# Patient Record
Sex: FEMALE | Race: WHITE | Employment: OTHER | URBAN - METROPOLITAN AREA
[De-identification: names, ages, dates, MRNs, and addresses within clinical notes are randomized per-mention and may not be internally consistent; named-entity substitution may affect disease eponyms.]

---

## 2018-12-03 ENCOUNTER — HOSPITAL ENCOUNTER (OUTPATIENT)
Dept: VASCULAR LAB | Age: 83
Discharge: HOME | End: 2018-12-03
Payer: MEDICARE

## 2018-12-03 PROCEDURE — 93922 UPR/L XTREMITY ART 2 LEVELS: CPT

## 2020-10-11 ENCOUNTER — APPOINTMENT (OUTPATIENT)
Dept: GENERAL RADIOLOGY | Age: 85
DRG: 956 | End: 2020-10-11
Payer: MEDICARE

## 2020-10-11 ENCOUNTER — APPOINTMENT (OUTPATIENT)
Dept: CT IMAGING | Age: 85
DRG: 956 | End: 2020-10-11
Payer: MEDICARE

## 2020-10-11 ENCOUNTER — HOSPITAL ENCOUNTER (INPATIENT)
Age: 85
LOS: 4 days | Discharge: NURSING, REHAB OR LONG-TERM CARE | DRG: 956 | End: 2020-10-16
Attending: EMERGENCY MEDICINE | Admitting: FAMILY MEDICINE
Payer: MEDICARE

## 2020-10-11 LAB
A/G RATIO: 1.5 (ref 1.1–2.2)
ALBUMIN SERPL-MCNC: 4.1 G/DL (ref 3.4–5)
ALP BLD-CCNC: 87 U/L (ref 40–129)
ALT SERPL-CCNC: 36 U/L (ref 10–40)
ANION GAP SERPL CALCULATED.3IONS-SCNC: 13 MMOL/L (ref 3–16)
APTT: 20.8 SEC (ref 24.2–36.2)
AST SERPL-CCNC: 79 U/L (ref 15–37)
BACTERIA: ABNORMAL /HPF
BASOPHILS ABSOLUTE: 0.1 K/UL (ref 0–0.2)
BASOPHILS RELATIVE PERCENT: 0.5 %
BILIRUB SERPL-MCNC: 0.8 MG/DL (ref 0–1)
BILIRUBIN URINE: NEGATIVE
BLOOD, URINE: ABNORMAL
BUN BLDV-MCNC: 32 MG/DL (ref 7–20)
CALCIUM SERPL-MCNC: 9.1 MG/DL (ref 8.3–10.6)
CHLORIDE BLD-SCNC: 102 MMOL/L (ref 99–110)
CLARITY: ABNORMAL
CO2: 22 MMOL/L (ref 21–32)
COLOR: YELLOW
CREAT SERPL-MCNC: <0.5 MG/DL (ref 0.6–1.2)
EOSINOPHILS ABSOLUTE: 0.1 K/UL (ref 0–0.6)
EOSINOPHILS RELATIVE PERCENT: 0.7 %
EPITHELIAL CELLS, UA: ABNORMAL /HPF (ref 0–5)
GFR AFRICAN AMERICAN: >60
GFR NON-AFRICAN AMERICAN: >60
GLOBULIN: 2.8 G/DL
GLUCOSE BLD-MCNC: 142 MG/DL (ref 70–99)
GLUCOSE URINE: NEGATIVE MG/DL
HCT VFR BLD CALC: 37.1 % (ref 36–48)
HEMOGLOBIN: 12.5 G/DL (ref 12–16)
INR BLD: 1.16 (ref 0.86–1.14)
KETONES, URINE: 15 MG/DL
LEUKOCYTE ESTERASE, URINE: ABNORMAL
LYMPHOCYTES ABSOLUTE: 3 K/UL (ref 1–5.1)
LYMPHOCYTES RELATIVE PERCENT: 18.6 %
MCH RBC QN AUTO: 31.2 PG (ref 26–34)
MCHC RBC AUTO-ENTMCNC: 33.6 G/DL (ref 31–36)
MCV RBC AUTO: 92.7 FL (ref 80–100)
MICROSCOPIC EXAMINATION: YES
MONOCYTES ABSOLUTE: 1.5 K/UL (ref 0–1.3)
MONOCYTES RELATIVE PERCENT: 9.2 %
MUCUS: ABNORMAL /LPF
NEUTROPHILS ABSOLUTE: 11.6 K/UL (ref 1.7–7.7)
NEUTROPHILS RELATIVE PERCENT: 71 %
NITRITE, URINE: POSITIVE
PDW BLD-RTO: 14 % (ref 12.4–15.4)
PH UA: 6 (ref 5–8)
PLATELET # BLD: 218 K/UL (ref 135–450)
PMV BLD AUTO: 9.7 FL (ref 5–10.5)
POTASSIUM SERPL-SCNC: 4 MMOL/L (ref 3.5–5.1)
PROTEIN UA: 30 MG/DL
PROTHROMBIN TIME: 13.5 SEC (ref 10–13.2)
RBC # BLD: 4 M/UL (ref 4–5.2)
RBC UA: ABNORMAL /HPF (ref 0–4)
SARS-COV-2, NAAT: NOT DETECTED
SODIUM BLD-SCNC: 137 MMOL/L (ref 136–145)
SPECIFIC GRAVITY UA: >=1.03 (ref 1–1.03)
TOTAL CK: 2872 U/L (ref 26–192)
TOTAL PROTEIN: 6.9 G/DL (ref 6.4–8.2)
TROPONIN: <0.01 NG/ML
URINE REFLEX TO CULTURE: YES
URINE TYPE: ABNORMAL
UROBILINOGEN, URINE: 0.2 E.U./DL
WBC # BLD: 16.4 K/UL (ref 4–11)
WBC UA: ABNORMAL /HPF (ref 0–5)

## 2020-10-11 PROCEDURE — 82550 ASSAY OF CK (CPK): CPT

## 2020-10-11 PROCEDURE — 85730 THROMBOPLASTIN TIME PARTIAL: CPT

## 2020-10-11 PROCEDURE — 72125 CT NECK SPINE W/O DYE: CPT

## 2020-10-11 PROCEDURE — 80053 COMPREHEN METABOLIC PANEL: CPT

## 2020-10-11 PROCEDURE — 2580000003 HC RX 258: Performed by: NURSE PRACTITIONER

## 2020-10-11 PROCEDURE — 70450 CT HEAD/BRAIN W/O DYE: CPT

## 2020-10-11 PROCEDURE — 72192 CT PELVIS W/O DYE: CPT

## 2020-10-11 PROCEDURE — 87086 URINE CULTURE/COLONY COUNT: CPT

## 2020-10-11 PROCEDURE — 87077 CULTURE AEROBIC IDENTIFY: CPT

## 2020-10-11 PROCEDURE — 86900 BLOOD TYPING SEROLOGIC ABO: CPT

## 2020-10-11 PROCEDURE — U0002 COVID-19 LAB TEST NON-CDC: HCPCS

## 2020-10-11 PROCEDURE — 36415 COLL VENOUS BLD VENIPUNCTURE: CPT

## 2020-10-11 PROCEDURE — 86901 BLOOD TYPING SEROLOGIC RH(D): CPT

## 2020-10-11 PROCEDURE — 99285 EMERGENCY DEPT VISIT HI MDM: CPT

## 2020-10-11 PROCEDURE — 93005 ELECTROCARDIOGRAM TRACING: CPT | Performed by: EMERGENCY MEDICINE

## 2020-10-11 PROCEDURE — 73502 X-RAY EXAM HIP UNI 2-3 VIEWS: CPT

## 2020-10-11 PROCEDURE — 85610 PROTHROMBIN TIME: CPT

## 2020-10-11 PROCEDURE — 85025 COMPLETE CBC W/AUTO DIFF WBC: CPT

## 2020-10-11 PROCEDURE — 81001 URINALYSIS AUTO W/SCOPE: CPT

## 2020-10-11 PROCEDURE — 71045 X-RAY EXAM CHEST 1 VIEW: CPT

## 2020-10-11 PROCEDURE — 84484 ASSAY OF TROPONIN QUANT: CPT

## 2020-10-11 PROCEDURE — 6370000000 HC RX 637 (ALT 250 FOR IP): Performed by: NURSE PRACTITIONER

## 2020-10-11 PROCEDURE — 84100 ASSAY OF PHOSPHORUS: CPT

## 2020-10-11 PROCEDURE — 51702 INSERT TEMP BLADDER CATH: CPT

## 2020-10-11 PROCEDURE — 87186 SC STD MICRODIL/AGAR DIL: CPT

## 2020-10-11 PROCEDURE — 86850 RBC ANTIBODY SCREEN: CPT

## 2020-10-11 PROCEDURE — 84550 ASSAY OF BLOOD/URIC ACID: CPT

## 2020-10-11 RX ORDER — ACETAMINOPHEN 500 MG
1000 TABLET ORAL ONCE
Status: COMPLETED | OUTPATIENT
Start: 2020-10-11 | End: 2020-10-11

## 2020-10-11 RX ORDER — BUSPIRONE HYDROCHLORIDE 5 MG/1
1 TABLET ORAL DAILY
COMMUNITY
Start: 2020-10-07

## 2020-10-11 RX ORDER — SODIUM CHLORIDE 9 MG/ML
1000 INJECTION, SOLUTION INTRAVENOUS CONTINUOUS
Status: DISCONTINUED | OUTPATIENT
Start: 2020-10-11 | End: 2020-10-13

## 2020-10-11 RX ORDER — DICLOFENAC SODIUM 75 MG/1
1 TABLET, DELAYED RELEASE ORAL 2 TIMES DAILY
Status: ON HOLD | COMMUNITY
Start: 2020-10-07 | End: 2020-10-12 | Stop reason: ALTCHOICE

## 2020-10-11 RX ORDER — LEVOTHYROXINE SODIUM 0.05 MG/1
1 TABLET ORAL DAILY
COMMUNITY
Start: 2020-07-28

## 2020-10-11 RX ORDER — PAROXETINE HYDROCHLORIDE 40 MG/1
1 TABLET, FILM COATED ORAL DAILY
Status: ON HOLD | COMMUNITY
Start: 2020-10-07 | End: 2020-10-12

## 2020-10-11 RX ORDER — OMEPRAZOLE 20 MG/1
1 CAPSULE, DELAYED RELEASE ORAL DAILY
Status: ON HOLD | COMMUNITY
Start: 2020-10-07 | End: 2021-02-24

## 2020-10-11 RX ORDER — LIDOCAINE 4 G/G
1 PATCH TOPICAL DAILY
Status: DISCONTINUED | OUTPATIENT
Start: 2020-10-11 | End: 2020-10-16 | Stop reason: HOSPADM

## 2020-10-11 RX ORDER — LANOLIN ALCOHOL/MO/W.PET/CERES
6 CREAM (GRAM) TOPICAL EVERY EVENING
Status: ON HOLD | COMMUNITY
Start: 2020-01-22 | End: 2020-10-12

## 2020-10-11 RX ORDER — 0.9 % SODIUM CHLORIDE 0.9 %
1000 INTRAVENOUS SOLUTION INTRAVENOUS ONCE
Status: COMPLETED | OUTPATIENT
Start: 2020-10-11 | End: 2020-10-11

## 2020-10-11 RX ADMIN — ACETAMINOPHEN 1000 MG: 500 TABLET ORAL at 20:26

## 2020-10-11 RX ADMIN — SODIUM CHLORIDE 1000 ML: 9 INJECTION, SOLUTION INTRAVENOUS at 22:34

## 2020-10-11 RX ADMIN — SODIUM CHLORIDE 1000 ML: 9 INJECTION, SOLUTION INTRAVENOUS at 20:27

## 2020-10-11 SDOH — HEALTH STABILITY: MENTAL HEALTH: HOW OFTEN DO YOU HAVE A DRINK CONTAINING ALCOHOL?: NEVER

## 2020-10-11 ASSESSMENT — PAIN SCALES - GENERAL
PAINLEVEL_OUTOF10: 9
PAINLEVEL_OUTOF10: 8
PAINLEVEL_OUTOF10: 9

## 2020-10-11 ASSESSMENT — PAIN DESCRIPTION - ORIENTATION: ORIENTATION: RIGHT

## 2020-10-11 ASSESSMENT — PAIN DESCRIPTION - DESCRIPTORS: DESCRIPTORS: ACHING

## 2020-10-11 ASSESSMENT — PAIN DESCRIPTION - PAIN TYPE: TYPE: ACUTE PAIN

## 2020-10-11 ASSESSMENT — PAIN DESCRIPTION - PROGRESSION: CLINICAL_PROGRESSION: NOT CHANGED

## 2020-10-11 ASSESSMENT — PAIN DESCRIPTION - LOCATION: LOCATION: HIP

## 2020-10-11 ASSESSMENT — PAIN DESCRIPTION - FREQUENCY: FREQUENCY: CONTINUOUS

## 2020-10-11 NOTE — ED NOTES

## 2020-10-11 NOTE — ED NOTES
Bed: 16  Expected date: 10/11/20  Expected time:   Means of arrival:   Comments:  Sean Raza RN  10/11/20 1920

## 2020-10-12 ENCOUNTER — APPOINTMENT (OUTPATIENT)
Dept: GENERAL RADIOLOGY | Age: 85
DRG: 956 | End: 2020-10-12
Payer: MEDICARE

## 2020-10-12 ENCOUNTER — ANESTHESIA (OUTPATIENT)
Dept: OPERATING ROOM | Age: 85
DRG: 956 | End: 2020-10-12
Payer: MEDICARE

## 2020-10-12 ENCOUNTER — ANESTHESIA EVENT (OUTPATIENT)
Dept: OPERATING ROOM | Age: 85
DRG: 956 | End: 2020-10-12
Payer: MEDICARE

## 2020-10-12 VITALS
OXYGEN SATURATION: 86 % | SYSTOLIC BLOOD PRESSURE: 137 MMHG | DIASTOLIC BLOOD PRESSURE: 63 MMHG | RESPIRATION RATE: 17 BRPM

## 2020-10-12 PROBLEM — T14.8XXA HEMATOMA: Status: ACTIVE | Noted: 2020-01-21

## 2020-10-12 PROBLEM — N30.00 ACUTE CYSTITIS: Status: ACTIVE | Noted: 2020-01-20

## 2020-10-12 PROBLEM — F32.A DEPRESSION: Status: ACTIVE | Noted: 2020-01-20

## 2020-10-12 PROBLEM — S27.329A PULMONARY CONTUSION: Status: ACTIVE | Noted: 2020-01-21

## 2020-10-12 PROBLEM — S22.20XA STERNAL FRACTURE: Status: ACTIVE | Noted: 2020-01-21
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
## 2020-10-12 PROBLEM — M62.82 RHABDOMYOLYSIS: Status: ACTIVE | Noted: 2020-10-12

## 2020-10-12 PROBLEM — F41.9 ANXIETY: Status: ACTIVE | Noted: 2020-10-12

## 2020-10-12 PROBLEM — R94.31 PROLONGED QT INTERVAL: Status: ACTIVE | Noted: 2020-10-12

## 2020-10-12 PROBLEM — N39.0 UTI (URINARY TRACT INFECTION): Status: ACTIVE | Noted: 2020-10-12

## 2020-10-12 PROBLEM — K52.9 COLITIS: Status: ACTIVE | Noted: 2020-01-21

## 2020-10-12 PROBLEM — S72.002P: Status: ACTIVE | Noted: 2020-10-12

## 2020-10-12 PROBLEM — S91.113A: Status: ACTIVE | Noted: 2020-01-21

## 2020-10-12 PROBLEM — K21.9 GERD (GASTROESOPHAGEAL REFLUX DISEASE): Status: ACTIVE | Noted: 2020-10-12

## 2020-10-12 PROBLEM — S01.01XA SCALP LACERATION: Status: ACTIVE | Noted: 2020-01-21

## 2020-10-12 PROBLEM — D72.829 LEUKOCYTOSIS: Status: ACTIVE | Noted: 2020-10-12

## 2020-10-12 PROBLEM — R74.8 ELEVATED CK: Status: ACTIVE | Noted: 2020-10-12

## 2020-10-12 LAB
ABO/RH: NORMAL
ANTIBODY SCREEN: NORMAL
EKG ATRIAL RATE: 88 BPM
EKG DIAGNOSIS: NORMAL
EKG P AXIS: 56 DEGREES
EKG P-R INTERVAL: 164 MS
EKG Q-T INTERVAL: 394 MS
EKG QRS DURATION: 84 MS
EKG QTC CALCULATION (BAZETT): 476 MS
EKG R AXIS: -26 DEGREES
EKG T AXIS: 12 DEGREES
EKG VENTRICULAR RATE: 88 BPM
PHOSPHORUS: 2.4 MG/DL (ref 2.5–4.9)
URIC ACID, SERUM: 4.2 MG/DL (ref 2.6–6)

## 2020-10-12 PROCEDURE — 93010 ELECTROCARDIOGRAM REPORT: CPT | Performed by: INTERNAL MEDICINE

## 2020-10-12 PROCEDURE — 2580000003 HC RX 258: Performed by: FAMILY MEDICINE

## 2020-10-12 PROCEDURE — 6370000000 HC RX 637 (ALT 250 FOR IP): Performed by: STUDENT IN AN ORGANIZED HEALTH CARE EDUCATION/TRAINING PROGRAM

## 2020-10-12 PROCEDURE — 2580000003 HC RX 258: Performed by: NURSE ANESTHETIST, CERTIFIED REGISTERED

## 2020-10-12 PROCEDURE — 6360000002 HC RX W HCPCS: Performed by: ORTHOPAEDIC SURGERY

## 2020-10-12 PROCEDURE — 2580000003 HC RX 258: Performed by: NURSE PRACTITIONER

## 2020-10-12 PROCEDURE — 2709999900 HC NON-CHARGEABLE SUPPLY: Performed by: ORTHOPAEDIC SURGERY

## 2020-10-12 PROCEDURE — 3700000000 HC ANESTHESIA ATTENDED CARE: Performed by: ORTHOPAEDIC SURGERY

## 2020-10-12 PROCEDURE — 6370000000 HC RX 637 (ALT 250 FOR IP): Performed by: ORTHOPAEDIC SURGERY

## 2020-10-12 PROCEDURE — 2500000003 HC RX 250 WO HCPCS: Performed by: ORTHOPAEDIC SURGERY

## 2020-10-12 PROCEDURE — 6360000002 HC RX W HCPCS: Performed by: PHYSICIAN ASSISTANT

## 2020-10-12 PROCEDURE — 2500000003 HC RX 250 WO HCPCS: Performed by: NURSE ANESTHETIST, CERTIFIED REGISTERED

## 2020-10-12 PROCEDURE — 2500000003 HC RX 250 WO HCPCS: Performed by: ANESTHESIOLOGY

## 2020-10-12 PROCEDURE — 6360000002 HC RX W HCPCS: Performed by: STUDENT IN AN ORGANIZED HEALTH CARE EDUCATION/TRAINING PROGRAM

## 2020-10-12 PROCEDURE — C1776 JOINT DEVICE (IMPLANTABLE): HCPCS | Performed by: ORTHOPAEDIC SURGERY

## 2020-10-12 PROCEDURE — 94640 AIRWAY INHALATION TREATMENT: CPT

## 2020-10-12 PROCEDURE — 73501 X-RAY EXAM HIP UNI 1 VIEW: CPT

## 2020-10-12 PROCEDURE — 3600000015 HC SURGERY LEVEL 5 ADDTL 15MIN: Performed by: ORTHOPAEDIC SURGERY

## 2020-10-12 PROCEDURE — 6360000002 HC RX W HCPCS: Performed by: NURSE PRACTITIONER

## 2020-10-12 PROCEDURE — 6360000002 HC RX W HCPCS: Performed by: NURSE ANESTHETIST, CERTIFIED REGISTERED

## 2020-10-12 PROCEDURE — 2580000003 HC RX 258: Performed by: ORTHOPAEDIC SURGERY

## 2020-10-12 PROCEDURE — 3600000005 HC SURGERY LEVEL 5 BASE: Performed by: ORTHOPAEDIC SURGERY

## 2020-10-12 PROCEDURE — 7100000000 HC PACU RECOVERY - FIRST 15 MIN: Performed by: ORTHOPAEDIC SURGERY

## 2020-10-12 PROCEDURE — 1200000000 HC SEMI PRIVATE

## 2020-10-12 PROCEDURE — 3700000001 HC ADD 15 MINUTES (ANESTHESIA): Performed by: ORTHOPAEDIC SURGERY

## 2020-10-12 PROCEDURE — 2580000003 HC RX 258: Performed by: STUDENT IN AN ORGANIZED HEALTH CARE EDUCATION/TRAINING PROGRAM

## 2020-10-12 PROCEDURE — 0SRS01Z REPLACEMENT OF LEFT HIP JOINT, FEMORAL SURFACE WITH METAL SYNTHETIC SUBSTITUTE, OPEN APPROACH: ICD-10-PCS | Performed by: ORTHOPAEDIC SURGERY

## 2020-10-12 PROCEDURE — 7100000001 HC PACU RECOVERY - ADDTL 15 MIN: Performed by: ORTHOPAEDIC SURGERY

## 2020-10-12 PROCEDURE — 72170 X-RAY EXAM OF PELVIS: CPT

## 2020-10-12 PROCEDURE — C9290 INJ, BUPIVACAINE LIPOSOME: HCPCS | Performed by: ORTHOPAEDIC SURGERY

## 2020-10-12 PROCEDURE — C9113 INJ PANTOPRAZOLE SODIUM, VIA: HCPCS | Performed by: STUDENT IN AN ORGANIZED HEALTH CARE EDUCATION/TRAINING PROGRAM

## 2020-10-12 DEVICE — STEM FEM SZ 15 L150MM 133DEG DST HIP PPS STD OFFSET TYP 1: Type: IMPLANTABLE DEVICE | Status: FUNCTIONAL

## 2020-10-12 DEVICE — HEAD FEM DIA28MM STD OFFSET HIP CO CHROM TYP 1 PRI MOD 2: Type: IMPLANTABLE DEVICE | Status: FUNCTIONAL

## 2020-10-12 DEVICE — IMPLANTABLE DEVICE: Type: IMPLANTABLE DEVICE | Status: FUNCTIONAL

## 2020-10-12 RX ORDER — MORPHINE SULFATE 2 MG/ML
2 INJECTION, SOLUTION INTRAMUSCULAR; INTRAVENOUS
Status: DISCONTINUED | OUTPATIENT
Start: 2020-10-12 | End: 2020-10-16 | Stop reason: HOSPADM

## 2020-10-12 RX ORDER — SENNA AND DOCUSATE SODIUM 50; 8.6 MG/1; MG/1
2 TABLET, FILM COATED ORAL DAILY
Status: DISCONTINUED | OUTPATIENT
Start: 2020-10-12 | End: 2020-10-12 | Stop reason: SDUPTHER

## 2020-10-12 RX ORDER — PROMETHAZINE HYDROCHLORIDE 25 MG/1
12.5 TABLET ORAL EVERY 6 HOURS PRN
Status: DISCONTINUED | OUTPATIENT
Start: 2020-10-12 | End: 2020-10-16 | Stop reason: HOSPADM

## 2020-10-12 RX ORDER — PHENYLEPHRINE HCL IN 0.9% NACL 1 MG/10 ML
SYRINGE (ML) INTRAVENOUS PRN
Status: DISCONTINUED | OUTPATIENT
Start: 2020-10-12 | End: 2020-10-12 | Stop reason: SDUPTHER

## 2020-10-12 RX ORDER — POLYETHYLENE GLYCOL 3350 17 G/17G
17 POWDER, FOR SOLUTION ORAL 2 TIMES DAILY
Status: ON HOLD | COMMUNITY
Start: 2020-01-22 | End: 2020-10-16 | Stop reason: SDUPTHER

## 2020-10-12 RX ORDER — SODIUM CHLORIDE 0.9 % (FLUSH) 0.9 %
10 SYRINGE (ML) INJECTION PRN
Status: DISCONTINUED | OUTPATIENT
Start: 2020-10-12 | End: 2020-10-12 | Stop reason: HOSPADM

## 2020-10-12 RX ORDER — GABAPENTIN 100 MG/1
100 CAPSULE ORAL EVERY 8 HOURS
Status: ON HOLD | COMMUNITY
Start: 2020-01-22 | End: 2020-10-16 | Stop reason: HOSPADM

## 2020-10-12 RX ORDER — POLYETHYLENE GLYCOL 3350 17 G/17G
17 POWDER, FOR SOLUTION ORAL DAILY PRN
Status: DISCONTINUED | OUTPATIENT
Start: 2020-10-12 | End: 2020-10-14

## 2020-10-12 RX ORDER — SODIUM CHLORIDE 0.9 % (FLUSH) 0.9 %
10 SYRINGE (ML) INJECTION PRN
Status: DISCONTINUED | OUTPATIENT
Start: 2020-10-12 | End: 2020-10-16 | Stop reason: HOSPADM

## 2020-10-12 RX ORDER — COVID-19 ANTIGEN TEST
1 KIT MISCELLANEOUS NIGHTLY
Status: ON HOLD | COMMUNITY
End: 2020-10-12 | Stop reason: ALTCHOICE

## 2020-10-12 RX ORDER — BACITRACIN ZINC AND POLYMYXIN B SULFATE 500; 1000 [USP'U]/G; [USP'U]/G
15 OINTMENT TOPICAL 2 TIMES DAILY
Status: ON HOLD | COMMUNITY
Start: 2020-01-22 | End: 2020-10-12

## 2020-10-12 RX ORDER — OXYCODONE HYDROCHLORIDE AND ACETAMINOPHEN 5; 325 MG/1; MG/1
1 TABLET ORAL PRN
Status: DISCONTINUED | OUTPATIENT
Start: 2020-10-12 | End: 2020-10-12 | Stop reason: HOSPADM

## 2020-10-12 RX ORDER — PAROXETINE HYDROCHLORIDE 20 MG/1
20 TABLET, FILM COATED ORAL DAILY
Status: ON HOLD | COMMUNITY
Start: 2020-01-23 | End: 2020-10-12

## 2020-10-12 RX ORDER — ACETAMINOPHEN 325 MG/1
650 TABLET ORAL EVERY 4 HOURS PRN
Status: DISCONTINUED | OUTPATIENT
Start: 2020-10-12 | End: 2020-10-12

## 2020-10-12 RX ORDER — OXYCODONE HYDROCHLORIDE 5 MG/1
5 TABLET ORAL EVERY 4 HOURS PRN
Status: DISCONTINUED | OUTPATIENT
Start: 2020-10-12 | End: 2020-10-16 | Stop reason: HOSPADM

## 2020-10-12 RX ORDER — POLYETHYLENE GLYCOL 3350 17 G/17G
17 POWDER, FOR SOLUTION ORAL 2 TIMES DAILY
Status: DISCONTINUED | OUTPATIENT
Start: 2020-10-12 | End: 2020-10-16 | Stop reason: HOSPADM

## 2020-10-12 RX ORDER — POLYETHYLENE GLYCOL 3350 17 G/17G
17 POWDER, FOR SOLUTION ORAL 2 TIMES DAILY
Status: DISCONTINUED | OUTPATIENT
Start: 2020-10-12 | End: 2020-10-12

## 2020-10-12 RX ORDER — FENTANYL CITRATE 50 UG/ML
INJECTION, SOLUTION INTRAMUSCULAR; INTRAVENOUS PRN
Status: DISCONTINUED | OUTPATIENT
Start: 2020-10-12 | End: 2020-10-12 | Stop reason: SDUPTHER

## 2020-10-12 RX ORDER — LIDOCAINE HYDROCHLORIDE 20 MG/ML
INJECTION, SOLUTION INFILTRATION; PERINEURAL PRN
Status: DISCONTINUED | OUTPATIENT
Start: 2020-10-12 | End: 2020-10-12 | Stop reason: SDUPTHER

## 2020-10-12 RX ORDER — ONDANSETRON 2 MG/ML
INJECTION INTRAMUSCULAR; INTRAVENOUS PRN
Status: DISCONTINUED | OUTPATIENT
Start: 2020-10-12 | End: 2020-10-12 | Stop reason: SDUPTHER

## 2020-10-12 RX ORDER — SENNA AND DOCUSATE SODIUM 50; 8.6 MG/1; MG/1
1 TABLET, FILM COATED ORAL 2 TIMES DAILY
Status: DISCONTINUED | OUTPATIENT
Start: 2020-10-12 | End: 2020-10-14

## 2020-10-12 RX ORDER — BUSPIRONE HYDROCHLORIDE 5 MG/1
5 TABLET ORAL DAILY
Status: DISCONTINUED | OUTPATIENT
Start: 2020-10-12 | End: 2020-10-16 | Stop reason: HOSPADM

## 2020-10-12 RX ORDER — OXYCODONE HYDROCHLORIDE 5 MG/1
10 TABLET ORAL EVERY 4 HOURS PRN
Status: DISCONTINUED | OUTPATIENT
Start: 2020-10-12 | End: 2020-10-16 | Stop reason: HOSPADM

## 2020-10-12 RX ORDER — BISACODYL 10 MG
10 SUPPOSITORY, RECTAL RECTAL DAILY PRN
Status: DISCONTINUED | OUTPATIENT
Start: 2020-10-12 | End: 2020-10-16 | Stop reason: HOSPADM

## 2020-10-12 RX ORDER — ONDANSETRON 2 MG/ML
4 INJECTION INTRAMUSCULAR; INTRAVENOUS
Status: DISCONTINUED | OUTPATIENT
Start: 2020-10-12 | End: 2020-10-12 | Stop reason: HOSPADM

## 2020-10-12 RX ORDER — LABETALOL HYDROCHLORIDE 5 MG/ML
INJECTION, SOLUTION INTRAVENOUS PRN
Status: DISCONTINUED | OUTPATIENT
Start: 2020-10-12 | End: 2020-10-12 | Stop reason: SDUPTHER

## 2020-10-12 RX ORDER — DICLOFENAC SODIUM 75 MG/1
1 TABLET, DELAYED RELEASE ORAL EVERY 12 HOURS
Status: ON HOLD | COMMUNITY
Start: 2018-11-27 | End: 2020-10-12 | Stop reason: ALTCHOICE

## 2020-10-12 RX ORDER — OXYCODONE HYDROCHLORIDE AND ACETAMINOPHEN 5; 325 MG/1; MG/1
2 TABLET ORAL PRN
Status: DISCONTINUED | OUTPATIENT
Start: 2020-10-12 | End: 2020-10-12 | Stop reason: HOSPADM

## 2020-10-12 RX ORDER — ALBUTEROL SULFATE 90 UG/1
2 AEROSOL, METERED RESPIRATORY (INHALATION) EVERY 4 HOURS PRN
Status: DISCONTINUED | OUTPATIENT
Start: 2020-10-12 | End: 2020-10-12

## 2020-10-12 RX ORDER — ONDANSETRON 2 MG/ML
4 INJECTION INTRAMUSCULAR; INTRAVENOUS EVERY 6 HOURS PRN
Status: DISCONTINUED | OUTPATIENT
Start: 2020-10-12 | End: 2020-10-12

## 2020-10-12 RX ORDER — AMOXICILLIN 250 MG
1 CAPSULE ORAL 2 TIMES DAILY
Status: ON HOLD | COMMUNITY
Start: 2020-01-22 | End: 2020-10-16 | Stop reason: HOSPADM

## 2020-10-12 RX ORDER — MORPHINE SULFATE 2 MG/ML
2 INJECTION, SOLUTION INTRAMUSCULAR; INTRAVENOUS EVERY 5 MIN PRN
Status: DISCONTINUED | OUTPATIENT
Start: 2020-10-12 | End: 2020-10-12 | Stop reason: HOSPADM

## 2020-10-12 RX ORDER — PANTOPRAZOLE SODIUM 40 MG/10ML
40 INJECTION, POWDER, LYOPHILIZED, FOR SOLUTION INTRAVENOUS DAILY
Status: DISCONTINUED | OUTPATIENT
Start: 2020-10-12 | End: 2020-10-16 | Stop reason: HOSPADM

## 2020-10-12 RX ORDER — PROPOFOL 10 MG/ML
INJECTION, EMULSION INTRAVENOUS PRN
Status: DISCONTINUED | OUTPATIENT
Start: 2020-10-12 | End: 2020-10-12 | Stop reason: SDUPTHER

## 2020-10-12 RX ORDER — SODIUM CHLORIDE 0.9 % (FLUSH) 0.9 %
10 SYRINGE (ML) INJECTION EVERY 12 HOURS SCHEDULED
Status: DISCONTINUED | OUTPATIENT
Start: 2020-10-12 | End: 2020-10-12 | Stop reason: HOSPADM

## 2020-10-12 RX ORDER — CITALOPRAM 10 MG/1
10 TABLET ORAL DAILY
Status: ON HOLD | COMMUNITY
Start: 2020-01-27 | End: 2020-10-12

## 2020-10-12 RX ORDER — ROCURONIUM BROMIDE 10 MG/ML
INJECTION, SOLUTION INTRAVENOUS PRN
Status: DISCONTINUED | OUTPATIENT
Start: 2020-10-12 | End: 2020-10-12 | Stop reason: SDUPTHER

## 2020-10-12 RX ORDER — MORPHINE SULFATE 2 MG/ML
1 INJECTION, SOLUTION INTRAMUSCULAR; INTRAVENOUS EVERY 5 MIN PRN
Status: DISCONTINUED | OUTPATIENT
Start: 2020-10-12 | End: 2020-10-12 | Stop reason: HOSPADM

## 2020-10-12 RX ORDER — SODIUM CHLORIDE 0.9 % (FLUSH) 0.9 %
10 SYRINGE (ML) INJECTION EVERY 12 HOURS SCHEDULED
Status: DISCONTINUED | OUTPATIENT
Start: 2020-10-12 | End: 2020-10-12 | Stop reason: SDUPTHER

## 2020-10-12 RX ORDER — LEVOTHYROXINE SODIUM 0.05 MG/1
50 TABLET ORAL DAILY
Status: DISCONTINUED | OUTPATIENT
Start: 2020-10-12 | End: 2020-10-16 | Stop reason: HOSPADM

## 2020-10-12 RX ORDER — CITALOPRAM 20 MG/1
20 TABLET ORAL DAILY
Status: ON HOLD | COMMUNITY
Start: 2020-02-03 | End: 2020-10-12

## 2020-10-12 RX ORDER — PAROXETINE HYDROCHLORIDE 20 MG/1
40 TABLET, FILM COATED ORAL DAILY
Status: DISCONTINUED | OUTPATIENT
Start: 2020-10-12 | End: 2020-10-16 | Stop reason: HOSPADM

## 2020-10-12 RX ORDER — SODIUM CHLORIDE 9 MG/ML
INJECTION, SOLUTION INTRAVENOUS CONTINUOUS
Status: DISCONTINUED | OUTPATIENT
Start: 2020-10-12 | End: 2020-10-13

## 2020-10-12 RX ORDER — MORPHINE SULFATE 4 MG/ML
4 INJECTION, SOLUTION INTRAMUSCULAR; INTRAVENOUS
Status: DISCONTINUED | OUTPATIENT
Start: 2020-10-12 | End: 2020-10-16 | Stop reason: HOSPADM

## 2020-10-12 RX ORDER — ACETAMINOPHEN 325 MG/1
650 TABLET ORAL ONCE
Status: DISCONTINUED | OUTPATIENT
Start: 2020-10-12 | End: 2020-10-12

## 2020-10-12 RX ORDER — ALBUTEROL SULFATE 2.5 MG/3ML
2.5 SOLUTION RESPIRATORY (INHALATION) EVERY 4 HOURS PRN
Status: DISCONTINUED | OUTPATIENT
Start: 2020-10-12 | End: 2020-10-13

## 2020-10-12 RX ORDER — SODIUM CHLORIDE, SODIUM LACTATE, POTASSIUM CHLORIDE, CALCIUM CHLORIDE 600; 310; 30; 20 MG/100ML; MG/100ML; MG/100ML; MG/100ML
INJECTION, SOLUTION INTRAVENOUS CONTINUOUS
Status: DISCONTINUED | OUTPATIENT
Start: 2020-10-12 | End: 2020-10-12

## 2020-10-12 RX ORDER — ACETAMINOPHEN 325 MG/1
650 TABLET ORAL EVERY 6 HOURS
Status: DISCONTINUED | OUTPATIENT
Start: 2020-10-12 | End: 2020-10-13

## 2020-10-12 RX ORDER — MEPERIDINE HYDROCHLORIDE 50 MG/ML
12.5 INJECTION INTRAMUSCULAR; INTRAVENOUS; SUBCUTANEOUS EVERY 5 MIN PRN
Status: DISCONTINUED | OUTPATIENT
Start: 2020-10-12 | End: 2020-10-12 | Stop reason: HOSPADM

## 2020-10-12 RX ORDER — LIDOCAINE 50 MG/G
1 PATCH TOPICAL EVERY 24 HOURS
Status: ON HOLD | COMMUNITY
Start: 2020-01-22 | End: 2020-10-16 | Stop reason: HOSPADM

## 2020-10-12 RX ORDER — SODIUM CHLORIDE, SODIUM LACTATE, POTASSIUM CHLORIDE, CALCIUM CHLORIDE 600; 310; 30; 20 MG/100ML; MG/100ML; MG/100ML; MG/100ML
INJECTION, SOLUTION INTRAVENOUS CONTINUOUS PRN
Status: DISCONTINUED | OUTPATIENT
Start: 2020-10-12 | End: 2020-10-12 | Stop reason: SDUPTHER

## 2020-10-12 RX ORDER — CHOLECALCIFEROL (VITAMIN D3) 125 MCG
CAPSULE ORAL
Status: ON HOLD | COMMUNITY
Start: 2018-11-27 | End: 2020-10-12

## 2020-10-12 RX ORDER — SODIUM CHLORIDE 9 MG/ML
INJECTION, SOLUTION INTRAVENOUS CONTINUOUS
Status: DISCONTINUED | OUTPATIENT
Start: 2020-10-12 | End: 2020-10-16 | Stop reason: HOSPADM

## 2020-10-12 RX ORDER — PAROXETINE 10 MG/1
10 TABLET, FILM COATED ORAL DAILY
Status: ON HOLD | COMMUNITY
Start: 2020-01-27 | End: 2020-10-12

## 2020-10-12 RX ORDER — SENNOSIDES 8.6 MG
1 CAPSULE ORAL EVERY 6 HOURS
Status: ON HOLD | COMMUNITY
Start: 2018-11-27 | End: 2020-10-12

## 2020-10-12 RX ORDER — SODIUM CHLORIDE 0.9 % (FLUSH) 0.9 %
10 SYRINGE (ML) INJECTION PRN
Status: DISCONTINUED | OUTPATIENT
Start: 2020-10-12 | End: 2020-10-12 | Stop reason: SDUPTHER

## 2020-10-12 RX ORDER — BISACODYL 10 MG
10 SUPPOSITORY, RECTAL RECTAL DAILY PRN
COMMUNITY
Start: 2020-01-22

## 2020-10-12 RX ORDER — SODIUM CHLORIDE 0.9 % (FLUSH) 0.9 %
10 SYRINGE (ML) INJECTION EVERY 12 HOURS SCHEDULED
Status: DISCONTINUED | OUTPATIENT
Start: 2020-10-12 | End: 2020-10-16 | Stop reason: HOSPADM

## 2020-10-12 RX ADMIN — POLYETHYLENE GLYCOL 3350 17 G: 17 POWDER, FOR SOLUTION ORAL at 19:34

## 2020-10-12 RX ADMIN — CEFTRIAXONE SODIUM 1 G: 1 INJECTION, POWDER, FOR SOLUTION INTRAMUSCULAR; INTRAVENOUS at 08:59

## 2020-10-12 RX ADMIN — CEFAZOLIN SODIUM 2 G: 10 INJECTION, POWDER, FOR SOLUTION INTRAVENOUS at 19:34

## 2020-10-12 RX ADMIN — LABETALOL HYDROCHLORIDE 2.5 MG: 5 INJECTION, SOLUTION INTRAVENOUS at 12:56

## 2020-10-12 RX ADMIN — PANTOPRAZOLE SODIUM 40 MG: 40 INJECTION, POWDER, FOR SOLUTION INTRAVENOUS at 08:59

## 2020-10-12 RX ADMIN — SODIUM CHLORIDE: 9 INJECTION, SOLUTION INTRAVENOUS at 18:04

## 2020-10-12 RX ADMIN — CEFAZOLIN SODIUM 2 G: 10 INJECTION, POWDER, FOR SOLUTION INTRAVENOUS at 12:17

## 2020-10-12 RX ADMIN — DOCUSATE SODIUM 50MG AND SENNOSIDES 8.6MG 1 TABLET: 8.6; 5 TABLET, FILM COATED ORAL at 19:34

## 2020-10-12 RX ADMIN — SUGAMMADEX 200 MG: 100 INJECTION, SOLUTION INTRAVENOUS at 13:57

## 2020-10-12 RX ADMIN — ALBUTEROL SULFATE 2.5 MG: 2.5 SOLUTION RESPIRATORY (INHALATION) at 18:14

## 2020-10-12 RX ADMIN — CEFTRIAXONE SODIUM 1 G: 1 INJECTION, POWDER, FOR SOLUTION INTRAMUSCULAR; INTRAVENOUS at 00:13

## 2020-10-12 RX ADMIN — SODIUM CHLORIDE, SODIUM LACTATE, POTASSIUM CHLORIDE, AND CALCIUM CHLORIDE: .6; .31; .03; .02 INJECTION, SOLUTION INTRAVENOUS at 11:55

## 2020-10-12 RX ADMIN — SODIUM CHLORIDE, SODIUM LACTATE, POTASSIUM CHLORIDE, AND CALCIUM CHLORIDE: .6; .31; .03; .02 INJECTION, SOLUTION INTRAVENOUS at 13:38

## 2020-10-12 RX ADMIN — PROPOFOL 100 MG: 10 INJECTION, EMULSION INTRAVENOUS at 12:10

## 2020-10-12 RX ADMIN — FENTANYL CITRATE 50 MCG: 50 INJECTION, SOLUTION INTRAMUSCULAR; INTRAVENOUS at 12:33

## 2020-10-12 RX ADMIN — FENTANYL CITRATE 100 MCG: 50 INJECTION, SOLUTION INTRAMUSCULAR; INTRAVENOUS at 12:10

## 2020-10-12 RX ADMIN — Medication 1 MG: at 19:34

## 2020-10-12 RX ADMIN — FAMOTIDINE 20 MG: 10 INJECTION INTRAVENOUS at 11:58

## 2020-10-12 RX ADMIN — LIDOCAINE HYDROCHLORIDE 50 MG: 20 INJECTION, SOLUTION INFILTRATION; PERINEURAL at 12:10

## 2020-10-12 RX ADMIN — ACETAMINOPHEN 650 MG: 325 TABLET ORAL at 23:41

## 2020-10-12 RX ADMIN — Medication 80 MCG: at 12:10

## 2020-10-12 RX ADMIN — LEVOTHYROXINE SODIUM 50 MCG: 0.05 TABLET ORAL at 10:53

## 2020-10-12 RX ADMIN — ACETAMINOPHEN 650 MG: 325 TABLET ORAL at 18:04

## 2020-10-12 RX ADMIN — ONDANSETRON 4 MG: 2 INJECTION INTRAMUSCULAR; INTRAVENOUS at 13:53

## 2020-10-12 RX ADMIN — SODIUM CHLORIDE: 9 INJECTION, SOLUTION INTRAVENOUS at 09:03

## 2020-10-12 RX ADMIN — ROCURONIUM BROMIDE 50 MG: 10 SOLUTION INTRAVENOUS at 12:10

## 2020-10-12 RX ADMIN — ROCURONIUM BROMIDE 20 MG: 10 SOLUTION INTRAVENOUS at 12:57

## 2020-10-12 ASSESSMENT — PAIN DESCRIPTION - PAIN TYPE
TYPE: ACUTE PAIN
TYPE: SURGICAL PAIN

## 2020-10-12 ASSESSMENT — PULMONARY FUNCTION TESTS
PIF_VALUE: 16
PIF_VALUE: 16
PIF_VALUE: 18
PIF_VALUE: 3
PIF_VALUE: 16
PIF_VALUE: 18
PIF_VALUE: 16
PIF_VALUE: 17
PIF_VALUE: 18
PIF_VALUE: 17
PIF_VALUE: 12
PIF_VALUE: 10
PIF_VALUE: 18
PIF_VALUE: 16
PIF_VALUE: 17
PIF_VALUE: 17
PIF_VALUE: 16
PIF_VALUE: 18
PIF_VALUE: 17
PIF_VALUE: 17
PIF_VALUE: 10
PIF_VALUE: 16
PIF_VALUE: 17
PIF_VALUE: 16
PIF_VALUE: 17
PIF_VALUE: 15
PIF_VALUE: 18
PIF_VALUE: 17
PIF_VALUE: 17
PIF_VALUE: 16
PIF_VALUE: 18
PIF_VALUE: 17
PIF_VALUE: 17
PIF_VALUE: 16
PIF_VALUE: 16
PIF_VALUE: 17
PIF_VALUE: 17
PIF_VALUE: 18
PIF_VALUE: 18
PIF_VALUE: 17
PIF_VALUE: 16
PIF_VALUE: 16
PIF_VALUE: 1
PIF_VALUE: 17
PIF_VALUE: 18
PIF_VALUE: 17
PIF_VALUE: 17
PIF_VALUE: 16
PIF_VALUE: 18
PIF_VALUE: 17
PIF_VALUE: 16
PIF_VALUE: 17
PIF_VALUE: 18
PIF_VALUE: 17
PIF_VALUE: 18
PIF_VALUE: 16
PIF_VALUE: 18
PIF_VALUE: 0
PIF_VALUE: 9
PIF_VALUE: 17
PIF_VALUE: 16
PIF_VALUE: 17
PIF_VALUE: 12
PIF_VALUE: 16
PIF_VALUE: 18
PIF_VALUE: 16
PIF_VALUE: 17
PIF_VALUE: 17
PIF_VALUE: 0
PIF_VALUE: 2
PIF_VALUE: 18
PIF_VALUE: 16
PIF_VALUE: 18
PIF_VALUE: 9
PIF_VALUE: 16
PIF_VALUE: 17
PIF_VALUE: 17
PIF_VALUE: 14
PIF_VALUE: 17
PIF_VALUE: 17
PIF_VALUE: 18
PIF_VALUE: 4
PIF_VALUE: 18
PIF_VALUE: 17
PIF_VALUE: 18
PIF_VALUE: 16
PIF_VALUE: 1
PIF_VALUE: 0
PIF_VALUE: 16
PIF_VALUE: 16
PIF_VALUE: 17
PIF_VALUE: 14
PIF_VALUE: 16
PIF_VALUE: 17
PIF_VALUE: 17
PIF_VALUE: 18
PIF_VALUE: 19
PIF_VALUE: 10
PIF_VALUE: 16
PIF_VALUE: 18
PIF_VALUE: 16

## 2020-10-12 ASSESSMENT — PAIN DESCRIPTION - LOCATION
LOCATION: HIP
LOCATION: HIP

## 2020-10-12 ASSESSMENT — PAIN SCALES - GENERAL
PAINLEVEL_OUTOF10: 3
PAINLEVEL_OUTOF10: 1
PAINLEVEL_OUTOF10: 0
PAINLEVEL_OUTOF10: 0

## 2020-10-12 ASSESSMENT — PAIN DESCRIPTION - FREQUENCY: FREQUENCY: OTHER (COMMENT)

## 2020-10-12 ASSESSMENT — LIFESTYLE VARIABLES: SMOKING_STATUS: 0

## 2020-10-12 ASSESSMENT — PAIN DESCRIPTION - ORIENTATION
ORIENTATION: LEFT
ORIENTATION: LEFT

## 2020-10-12 ASSESSMENT — PAIN DESCRIPTION - ONSET: ONSET: OTHER (COMMENT)

## 2020-10-12 ASSESSMENT — PAIN DESCRIPTION - PROGRESSION: CLINICAL_PROGRESSION: OTHER (COMMENT)

## 2020-10-12 ASSESSMENT — ENCOUNTER SYMPTOMS: SHORTNESS OF BREATH: 1

## 2020-10-12 NOTE — PROGRESS NOTES
Inpatient Family Medicine Progress Note (Chart Update)      Subjective:     Patient has complaints of left hip pain with movement. Receiving tylenol prn. Surgery scheduled for 12:00 pm.    Patient received 1000mL  NS overnight, maintenance fluids started in am at 100 ml/hr. Patient is NPO until 12:00 PM.    EMR review and pt history states:  -  no prior heart conditions (CAD, a-fib, a-flutter, CHF, valvular disease)  - systolic blood pressure was not <90 or >180 on any reading  - troponin was not elevated, there is no abnormal QRS axis (<-30 degrees or >100 degrees)  - QRS duration is not >130 ms, QTc is not >480 ms  - ED clinical impression was not consistent with cardiac syncope (no sudden syncope, lack of prodrome, preceding palpitations, or injuries suggesting sudden fall)   - no history of CHF, hematocrit is above 30%, EKG is normal, no complaints of shortness of breath, and systolic blood pressure was above 90 mmHg at triage    BP and HR stable in ED  ACS NSQIP states low cardiac risk. Pt denies lung conditions or other chronic medications for heart or lung disease. Pt son at bedside. He denies prior problems or fam hx of problems with anesthesia, clotting, or bleeding disorders. He also denies prior heart or lung conditions. Son rates her as -poor functional capacity- as she has an aide that performs most household duties, and pt becomes more SOB when walking up stairs. She currently lives alone, however, son realized rehab is likely after surgery. She was in rehab last year with prior fall.      Medical History:  Hypothyroidism, GERD, Anxiety, Dementia    Surgical History:  Thyroidectomy > 10 yrs ago    Medications:   Paroxetine 40 mg qd  Omeprazole 20 mg qd  Buspar 5 mg  Synthroid 50 mcg    Denies tobacco and alcohol use    Physical Exam Performed: Vital signs stable during interview     /60   Pulse 89   Temp 98 °F (36.7 °C)   Resp 20   Ht 5' 7\" (1.702 m)   Wt 159 lb (72.1 kg) SpO2 96%   BMI 24.90 kg/m²     General appearance: Patient lying still in bed, on back, careful not to make too much movements due to left hip pain     ENT: Mallampati score: 3, Upper Dentures, No cracked teeth on bottom    Respiratory:  Easy and unlabored breathing. No audible wheezing with normal breaths. Cardiovascular:  Regular rate and rhythm with normal S1/S2   Abdomen: Soft, slight generalized tenderness, mildly distended    Musculoskeletal:  No clubbing, cyanosis or edema bilaterally. Pt can wiggle toes and lift heels 5 inch off the bed bilaterally. Increased pain on left hip. Skin: Warm and dry  Neurologic:  No focal deficits, no gross sensory deficits,  Palpable DPs   Psychiatric:  Alert , thought content appropriate, answered questions      Assessment/Plan: See Dr. Juanito Diallo note for full assessment and plan. 1. Pre-Op Exam:    Patient is cleared by medical team for surgery.     2. Left Hip Pain:  - NPO; 100 ml/hr fluids  - Tylenol 650 mg prn pain   ( increased AST; continue to monitor)      Jaun Benson DO  Family Medicine Resident PGY1

## 2020-10-12 NOTE — OP NOTE
Operative Note      Patient: Robson Calderon  YOB: 1935  MRN: [id number]    Date of Procedure: 10/12/2020    Pre-Op Diagnosis: LEFT FEMORAL NECK FRACTURE    Post-Op Diagnosis: Same       Procedure(s):  1. LEFT HIP HEMIARTHROPLASTY FOR FEMORAL NECK FRACTURE (78408)  2. INTRAOPERATIVE X-RAY LEFT HIP AND PELVIS (91822)    Surgeon(s):  Ester Burr MD    Assistant:  Surgical Assistant: Carmel Ford    Anesthesia: General    Estimated Blood Loss (mL): 914    Complications: None    Specimens:   * No specimens in log *    Implants:  Implant Name Type Inv. Item Serial No.  Lot No. LRB No. Used Action   IMPL HIP STEM FEM STD OFFST/TAPRLOC [id number] Hip IMPL HIP STEM FEM STD OFFST/TAPRLOC [id number]  [employer] [id number] Left 1 Implanted   IMPL HIP FEM HEAD MOD COBLT CHROME 28MM Hip IMPL HIP FEM HEAD MOD COBLT CHROME 28MM  [employer] [id number] Left 1 Implanted   IMPL HIP FEM SHLL CUP ACET [id number] Hip IMPL HIP FEM SHLL CUP ACET [id number]  [employer] [id number] Left 1 Implanted         Drains:   Urethral Catheter Double-lumen;Temperature probe 16 fr (Active)   Catheter Indications Perioperative use in selected surgeries including but not limited to urologic, pelvic or need for intraoperative monitoring of urinary output due to prolonged surgery, large volume infusion or need for diuretic therapy in surgery 10/11/20 2233   Site Assessment Red 10/11/20 2233   Urine Color Tea 10/11/20 2233   Urine Appearance Cloudy 10/11/20 2233   Urine Odor Malodorous 10/11/20 2233   Output (mL) 300 mL 10/11/20 2233     Antibiotics: 2 g ancef IV prior to incision    Findings: Displaced acute left femoral neck fracture      Surgical Indications  This 81-year-old female sustained a fall after syncopal event and could not ambulate afterward. She was brought to the emergency department yesterday on 10/1/2020 found to have a displaced left femoral neck fracture.   The patient was admitted to the hospital and received medical clearance. The patients family chose to proceed with left hip hemiarthroplasty. Risks, benefits, expected outcomes and potential complications were discussed. At no time were any guarantees implied or stated. The patient electively signed the consent form. Risks and benefits of the procedure were fully explained in detail, including but not limited to infection, neurovascular injury, continued pain, leg length discrepancy, hip instability, stiffness, need for further surgery, re-injury, DVT, PE, general risks of anesthesia and loss of limb or life. The patient understands all the risks and does wish to proceed with written consent. Patient Positioning and Surgical Prep  The patient was seen in the holding area and the left hip marked with an indelible pen. Preoperative IV antibiotics were then infused and the patient was taken to the operating room, administered general anesthesia, and transferred to a well padded table. The patient was positioned on the OR table and placed in the lateral decubitus position supported with a vacuum pack with all bony prominences well padded. The lower extremity was prepped from the flank to leg with Chloroprep and then draped in the normal sterile fashion. A timeout was performed per protocol.      Exposure  A posterior approach was performed. A slightly curved incision was made centered over the posterior aspect of the greater trochanter. The incision was carried down through the subcutaneous tissue to the fascia and hemostasis obtained. The fascia was incised in line with the skin incision and a Charnley retractor was placed taking care to protect the sciatic nerve. Anterior and posterior flaps were developed. The piriformis and external rotators was exposed and removed from the femoral neck along with the capsule. This was tagged with a #2 ethibond suture. Retractors were placed anterior and posterior to the femoral neck.  The fracture was identified and hematoma evacuated.       The femoral head was removed and measured. The acetabulum was sized using the femoral head trials and the head was sized to 49 mm. The femoral neck was osteotomized one finger breadth proximal to the lesser trochanter      After use of a proximal box osteotome and canal finder, sequential broaching was performed starting at a 4 mm component and up to size 15 mm component. Trialing was performed and a size 15 stem, and 49 mm bipolar head selected.     X-ray addendum: AP left hip/pelvis x-ray was used to visualize placement of trial components prior to final component placement. Good alignment of the implants with well fitting prosthesis and reasonable leg length      Final Component Placement and Reduction  The appropriate size femoral head trial was attached to the stem and the stem inserted. The hip was reduced, taken through a full range of motion and noted to be stable. The hip was dislocated and the final bipolar head impacted onto the stem. The hip was again reduced and taken through a full range of motion. It was stable in all planes, including the position of sleep and leg length was grossly equivalent. The wound was then copiously irrigated.      Closure and Disposition  The piriformis and capsule were re-approximated to the greater trochanter using 3 #2 ethibond sutures placed through the tendon and passed through drill holes in the trochanter. The fascia was closed with a interrupted #2 eithibond and a running  #1 Stratafix suture. The subcutaneous tissue was closed in layers with 0-vicryl, 2-0 Stratafix and the skin closed with 4.0 Monocryl and Dermabond PRINEO dressing. Sterile dressings and an abduction pillow were applied.      All sponge and needle counts were correct. The patient was awakened and taken to the postoperative area in stable condition. Chriss Heard MD  [address] [facility] partner of [facility])

## 2020-10-12 NOTE — PLAN OF CARE
Plan of care  -Status post left hip hemiarthroplasty  -Okay for 50% weightbearing left lower extremity with posterior hip precautions  -Pain control, postop antibiotics  -PT/OT  -Discharge planning pending progress    Chriss Heard MD  [id number] partner of [facility] ([facility])

## 2020-10-12 NOTE — ED NOTES
Patient identified as a positive fall risk on the ED triage fall screening. Patient placed in fall precautions which includes:  yellow fall risk bracelet on wrist, yellow socks on feet, \"Be Safe\" sign placed on patient's door, and bed alarm placed under patient/alarm turned on. Patient instructed on importance of not getting out of bed or ambulating without assistance for safety.           Sonja Narayan, [address] [place] [address]  10/12/20 2139

## 2020-10-12 NOTE — ED PROVIDER NOTES
administration of intravenous contrast. Dose modulation, iterative reconstruction, and/or weight based adjustment of the mA/kV was utilized to reduce the radiation dose to as low as reasonably achievable. COMPARISON: None. HISTORY: ORDERING SYSTEM PROVIDED HISTORY: HI TECHNOLOGIST PROVIDED HISTORY: Reason for exam:->HI Has a \"code stroke\" or \"stroke alert\" been called? ->No Reason for Exam: fall; r/o head injury Acuity: Acute Type of Exam: Initial Mechanism of Injury: fall FINDINGS: BRAIN/VENTRICLES: There is no acute intracranial hemorrhage, mass effect or midline shift. No abnormal extra-axial fluid collection. The gray-white differentiation is maintained without evidence of an acute infarct. There is no evidence of hydrocephalus. 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 ORBITS: The visualized portion of the orbits demonstrate no acute abnormality. SINUSES: The visualized paranasal sinuses and mastoid air cells demonstrate no acute abnormality. SOFT TISSUES/SKULL:  No acute abnormality of the visualized skull or soft tissues. No acute intracranial abnormality visualized. Ct Cervical Spine Wo Contrast    Result Date: 10/11/2020  EXAMINATION: CT OF THE CERVICAL SPINE WITHOUT CONTRAST 10/11/2020 8:43 pm TECHNIQUE: CT of the cervical spine was performed without the administration of intravenous contrast. Multiplanar reformatted images are provided for review. Dose modulation, iterative reconstruction, and/or weight based adjustment of the mA/kV was utilized to reduce the radiation dose to as low as reasonably achievable. COMPARISON: None. HISTORY: ORDERING SYSTEM PROVIDED HISTORY: HI TECHNOLOGIST PROVIDED HISTORY: Reason for exam:->HI Reason for Exam: fall, r/o injury to neck Acuity: Acute Type of Exam: Initial Mechanism of Injury: fall FINDINGS: BONES/ALIGNMENT: There is no acute fracture or traumatic malalignment. DEGENERATIVE CHANGES: Multilevel mild-to-moderate degenerative change of the cervical spine. SOFT TISSUES: There is no prevertebral soft tissue swelling. Partially imaged thyroid demonstrates heterogeneous and enlarged appearance of the right lobe of the thyroid gland. 1. No acute abnormality of the cervical spine. 2. Multilevel degenerative change of the cervical spine. 3. Partially imaged thyroid is enlarged and heterogeneous. Recommend nonemergent dedicated follow-up thyroid ultrasound as per below. RECOMMENDATIONS: Managing Incidental Thyroid Nodule Detected at CT or MRI or US1. Further evaluation by thyroid Ultrasound recommended for these incidental nodules: ~Age 18 years or less - Any nodule. ~Age 21-27 years old - Nodule 1 cm in size or greater. ~Age 35 years or more - Nodule 1.5 cm in size or greater2. Follow up thyroid ultrasound also recommend in these scenarios: ~Solitary nodule with high risk imaging features (locally invasive nodule or suspicious lymph nodes). ~Any nodule in a heterogeneous enlarged thyroid gland. NO further imaging is recommended in the following scenarios: ~No f/u imaging is recommended for ITNs not meeting the above criteria. ~No US or f/u recommended for ITNs without high risk features or with limited life expectancy or significant co-morbidities, unless clinically warranted. Note: These recommendations do not apply if increased risk for thyroid cancer or symptomatic thyroid disease. Recommendations for f/u of Incidental Thyroid Nodules (ITN) found on CT, MR, NM and Extrathyroidal US are based upon the ACR white paper and [facility] 3-tiered system for managing ITNs:J Am Deja Radiol.  2015 Feb;12(2): 143-50     Ct Pelvis Wo Contrast Additional Contrast? None    Result Date: 10/11/2020  EXAMINATION: CT OF THE PELVIS WITHOUT CONTRAST 10/11/2020 9:23 pm TECHNIQUE: CT of the pelvis was performed without the administration of intravenous contrast.  Multiplanar reformatted images are provided for review. Dose modulation, iterative reconstruction, and/or weight based adjustment of the mA/kV was utilized to reduce the radiation dose to as low as reasonably achievable. COMPARISON: Plain films done earlier today HISTORY: ORDERING SYSTEM PROVIDED HISTORY: Hip pain TECHNOLOGIST PROVIDED HISTORY: Reason for exam:->?femoral head Additional Contrast?->None Reason for Exam: ?femoral head, fall. left hip pain, Acuity: Acute Type of Exam: Initial Mechanism of Injury: fall FINDINGS: There is an acute, slightly comminuted, fracture of the left femoral neck with marked varus angulation as well as posterior angulation of the distal fracture fragment of almost 90 degrees. The fracture is impacted with some anterior displacement. No other acute fracture or dislocation is seen. There is a linear lucency along the lateral cortex of the lower left iliac wing, probably a vascular channel. Moderate degenerative changes seen in the lower lumbar spine. There is mild degenerative change about the left hip. There is no evidence of bony erosion or destructive lesion. No obvious soft tissue mass or fluid collection is seen. Fracture of the left femoral neck as described above. Mild degenerative change about the left hip. Xr Chest Portable    Result Date: 10/11/2020  EXAMINATION: ONE XRAY VIEW OF THE CHEST; ONE XRAY VIEW OF THE PELVIS AND TWO XRAY VIEWS RIGHT HIP 10/11/2020 7:38 pm; 10/11/2020 7:39 pm COMPARISON: None. HISTORY: ORDERING SYSTEM PROVIDED HISTORY: fall TECHNOLOGIST PROVIDED HISTORY: Reason for exam:->fall Reason for Exam: Fall Acuity: Acute Type of Exam: Initial; ORDERING SYSTEM PROVIDED HISTORY: fall TECHNOLOGIST PROVIDED HISTORY: Reason for exam:->fall Reason for Exam: Fall; right hip pain Acuity: Acute Type of Exam: Initial FINDINGS: Chest x-ray: No pneumothorax or pulmonary contusion or effusion is identified.   The heart size is normal. Pelvic x-ray with right hip: No acute fracture or hip dislocation is identified. There appears to be a chronic fracture deformity of the left femoral neck. No acute posttraumatic abnormality detected. Chronic appearing fracture deformity of the left hip. Xr Hip 2-3 Vw W Pelvis Right    Result Date: 10/11/2020  EXAMINATION: ONE XRAY VIEW OF THE CHEST; ONE XRAY VIEW OF THE PELVIS AND TWO XRAY VIEWS RIGHT HIP 10/11/2020 7:38 pm; 10/11/2020 7:39 pm COMPARISON: None. HISTORY: ORDERING SYSTEM PROVIDED HISTORY: fall TECHNOLOGIST PROVIDED HISTORY: Reason for exam:->fall Reason for Exam: Fall Acuity: Acute Type of Exam: Initial; ORDERING SYSTEM PROVIDED HISTORY: fall TECHNOLOGIST PROVIDED HISTORY: Reason for exam:->fall Reason for Exam: Fall; right hip pain Acuity: Acute Type of Exam: Initial FINDINGS: Chest x-ray: No pneumothorax or pulmonary contusion or effusion is identified. The heart size is normal. Pelvic x-ray with right hip: No acute fracture or hip dislocation is identified. There appears to be a chronic fracture deformity of the left femoral neck. No acute posttraumatic abnormality detected. Chronic appearing fracture deformity of the left hip.          Results for orders placed or performed during the hospital encounter of 10/11/20   CBC Auto Differential   Result Value Ref Range    WBC 16.4 (H) 4.0 - 11.0 K/uL    RBC 4.00 4.00 - 5.20 M/uL    Hemoglobin 12.5 12.0 - 16.0 g/dL    Hematocrit 37.1 36.0 - 48.0 %    MCV 92.7 80.0 - 100.0 fL    MCH 31.2 26.0 - 34.0 pg    MCHC 33.6 31.0 - 36.0 g/dL    RDW 14.0 12.4 - 15.4 %    Platelets 629 084 - 362 K/uL    MPV 9.7 5.0 - 10.5 fL    Neutrophils % 71.0 %    Lymphocytes % 18.6 %    Monocytes % 9.2 %    Eosinophils % 0.7 %    Basophils % 0.5 %    Neutrophils Absolute 11.6 (H) 1.7 - 7.7 K/uL    Lymphocytes Absolute 3.0 1.0 - 5.1 K/uL    Monocytes Absolute 1.5 (H) 0.0 - 1.3 K/uL    Eosinophils Absolute 0.1 0.0 - 0.6 K/uL    Basophils Absolute 0.1 0.0 - 0.2 K/uL   Comprehensive Metabolic Panel   Result Value Ref Range    Sodium 137 136 - 145 mmol/L    Potassium 4.0 3.5 - 5.1 mmol/L    Chloride 102 99 - 110 mmol/L    CO2 22 21 - 32 mmol/L    Anion Gap 13 3 - 16    Glucose 142 (H) 70 - 99 mg/dL    BUN 32 (H) 7 - 20 mg/dL    CREATININE <0.5 (L) 0.6 - 1.2 mg/dL    GFR Non-African American >60 >60    GFR African American >60 >60    Calcium 9.1 8.3 - 10.6 mg/dL    Total Protein 6.9 6.4 - 8.2 g/dL    Alb 4.1 3.4 - 5.0 g/dL    Albumin/Globulin Ratio 1.5 1.1 - 2.2    Total Bilirubin 0.8 0.0 - 1.0 mg/dL    Alkaline Phosphatase 87 40 - 129 U/L    ALT 36 10 - 40 U/L    AST 79 (H) 15 - 37 U/L    Globulin 2.8 g/dL   Troponin   Result Value Ref Range    Troponin <0.01 <0.01 ng/mL   Urinalysis Reflex to Culture    Specimen: Urine, indwelling catheter   Result Value Ref Range    Color, UA Yellow Straw/Yellow    Clarity, UA SL CLOUDY (A) Clear    Glucose, Ur Negative Negative mg/dL    Bilirubin Urine Negative Negative    Ketones, Urine 15 (A) Negative mg/dL    Specific Gravity, UA >=1.030 1.005 - 1.030    Blood, Urine MODERATE (A) Negative    pH, UA 6.0 5.0 - 8.0    Protein, UA 30 (A) Negative mg/dL    Urobilinogen, Urine 0.2 <2.0 E.U./dL    Nitrite, Urine POSITIVE (A) Negative    Leukocyte Esterase, Urine SMALL (A) Negative    Microscopic Examination YES     Urine Type NotGiven     Urine Reflex to Culture Yes    CK   Result Value Ref Range    Total CK 2,872 (H) 26 - 192 U/L   Protime-INR   Result Value Ref Range    Protime 13.5 (H) 10.0 - 13.2 sec    INR 1.16 (H) 0.86 - 1.14   APTT   Result Value Ref Range    aPTT 20.8 (L) 24.2 - 36.2 sec   Microscopic Urinalysis   Result Value Ref Range    Mucus, UA Rare (A) None Seen /LPF    WBC, UA  (A) 0 - 5 /HPF    RBC, UA 11-20 (A) 0 - 4 /HPF    Epithelial Cells, UA 0-1 0 - 5 /HPF    Bacteria, UA 4+ (A) None Seen /HPF   EKG 12 Lead   Result Value Ref Range    Ventricular Rate 88 BPM    Atrial Rate 88 BPM    P-R Interval 164 ms    QRS Duration 84 ms    Q-T Interval 394 ms    QTc Calculation ([place]) 476 ms    P Axis 56 degrees    R Axis -26 degrees    T Axis 12 degrees    Diagnosis       Normal sinus rhythmVoltage criteria for left ventricular hypertrophyNonspecific T wave abnormalityProlonged QTAbnormal ECGNo previous ECGs available       For further details of Sonia Davenport's emergency department encounter, please see Nemesio Sommer's documentation. This chart was generated in part by using Dragon Dictation system and may contain errors related to that system including errors in grammar, punctuation, and spelling, as well as words and phrases that may be inappropriate. If there are any questions or concerns please feel free to contact the dictating provider for clarification.            Rajiv Ford MD  10/11/20 9709

## 2020-10-12 NOTE — PROGRESS NOTES
RESPIRATORY THERAPY ASSESSMENT    Name:  Chon Amos  Medical Record Number:  [id number]  Age: 81 y.o. Gender: female  : 1935  Today's Date:  10/12/2020  Room:      Assessment     Is the patient being admitted for a COPD or Asthma exacerbation? No   (If yes the patient will be seen every 4 hours for the first 24 hours and then reassessed)    Patient Admission Diagnosis      Allergies  No Known Allergies    Minimum Predicted Vital Capacity:     924          Actual Vital Capacity:      Pt unavailable              Pulmonary History:No history  Home Oxygen Therapy:  Unknown/pt unsure  Home Respiratory Therapy:None /per chart  Current Respiratory Therapy:  Albuterol prn  Treatment Type: IS       Respiratory Severity Index(RSI)   Patients with orders for inhalation medications, oxygen, or any therapeutic treatment modality will be placed on Respiratory Protocol. They will be assessed with the first treatment and at least every 72 hours thereafter. The following severity scale will be used to determine frequency of treatment intervention.     Smoking History: No Smoking History = 0    Social History  Social History     Tobacco Use    Smoking status: Never Smoker    Smokeless tobacco: Never Used   Substance Use Topics    Alcohol use: Never     Frequency: Never    Drug use: Never       Recent Surgical History: None = 0  Past Surgical History  Past Surgical History:   Procedure Laterality Date    TOTAL THYROIDECTOMY         Level of Consciousness: Alert, Follows Commands but Disoriented = 1    Level of Activity: Mostly sedentary, minimal walking = 2    Respiratory Pattern: Regular Pattern; RR 8-20 = 0    Breath Sounds: Diminished unilaterally = 1    Sputum   ,  ,    Cough: Strong, spontaneous, non-productive = 0    Vital Signs   BP (!) 131/56   Pulse 79   Temp 98 °F (36.7 °C)   Resp 18   Ht 5' 7\" (1.702 m)   Wt 159 lb (72.1 kg)   SpO2 94%   BMI 24.90 kg/m²   SPO2 (COPD values may differ): Greater than or equal to 92% on room air = 0    Peak Flow (asthma only): not applicable = 0    RSI: 0-4 = See once and convert to home regimen or discontinue        Plan       Goals: medication delivery    Patient/caregiver was educated on the proper method of use for Respiratory Care Devices:  Yes      Level of patient/caregiver understanding able to:   ? Verbalize understanding   ? Demonstrate understanding       ? Teach back        ? Needs reinforcement       ? No available caregiver               ? Other:     Response to education:  good     Is patient being placed on Home Treatment Regimen? No     Does the patient have everything they need prior to discharge? NA     Comments: pt seen and chart reviewed    Plan of Care: albuterol prn    Electronically signed by Vikki Ortiz RCP on 10/12/2020 at 3:50 AM    Respiratory Protocol Guidelines     1. Assessment and treatment by Respiratory Therapy will be initiated for medication and therapeutic interventions upon initiation of aerosolized medication. 2. Physician will be contacted for respiratory rate (RR) greater than 35 breaths per minute. Therapy will be held for heart rate (HR) greater than 140 beats per minute, pending direction from physician. 3. Bronchodilators will be administered via Metered Dose Inhaler (MDI) with spacer when the following criteria are met:  a. Alert and cooperative     b. HR < 140 bpm  c. RR < 30 bpm                d. Can demonstrate a 2-3 second inspiratory hold  4. Bronchodilators will be administered via Hand Held Nebulizer ONEL [facility]) to patients when ANY of the following criteria are met  a. Incognizant or uncooperative          b. Patients treated with HHN at Home        c. Unable to demonstrate proper use of MDI with spacer     d. RR > 30 bpm   5. Bronchodilators will be delivered via Metered Dose Inhaler (MDI), HHN, Aerogen to intubated patients on mechanical ventilation.   6. Inhalation medication orders will be delivered

## 2020-10-12 NOTE — ED NOTES
Spoke with patient's son Luís Oleary to update him on patient's condition. Patient's son would like to be called before any decisions to have surgery are made.       Liss Lim, RN  10/12/20 6737

## 2020-10-12 NOTE — ANESTHESIA PRE PROCEDURE
Department of Anesthesiology  Preprocedure Note       Name:  Georgia Chavez   Age:  81 y.o.  :  1935                                          MRN:  [id number]         Date:  10/12/2020      Surgeon: Adan Montero):  Monica Cohen MD    Procedure: Procedure(s):  LEFT HIP HEMIARTHROPLASTY    Medications prior to admission:   Prior to Admission medications    Medication Sig Start Date End Date Taking? Authorizing Provider   Acetaminophen 325 MG CAPS Take 975 mg by mouth every 8 hours as needed 20  Yes Historical Provider, MD   senna-docusate ([place]) 8.6-50 MG per tablet Take 1 tablet by mouth 2 times daily 20  Yes Historical Provider, MD   polyethylene glycol (GLYCOLAX) 17 g packet Take 17 g by mouth 2 times daily 20  Yes Historical Provider, MD   bisacodyl (DULCOLAX) 10 MG suppository Place 10 mg rectally daily as needed 20  Yes Historical Provider, MD   enoxaparin (LOVENOX) 40 MG/0.4ML injection Inject 40 mg into the skin every 12 hours 20  Yes Historical Provider, MD   gabapentin (NEURONTIN) 100 MG capsule Take 100 mg by mouth every 8 hours.  20  Yes Historical Provider, MD   lidocaine (LIDODERM) 5 % Place 1 patch onto the skin every 24 hours 20  Yes Historical Provider, MD   ASPIRIN 81 PO Take 81 mg by mouth daily    Historical Provider, MD   omeprazole (PRILOSEC) 20 MG delayed release capsule Take 1 capsule by mouth daily 10/7/20   Historical Provider, MD   levothyroxine (SYNTHROID) 50 MCG tablet Take 1 tablet by mouth daily 20   Historical Provider, MD   busPIRone (BUSPAR) 5 MG tablet Take 1 tablet by mouth daily 10/7/20   Historical Provider, MD   PARoxetine (PAXIL) 40 MG tablet Take 1 tablet by mouth daily 10/7/20 10/12/20  Historical Provider, MD       Current medications:    Current Facility-Administered Medications   Medication Dose Route Frequency Provider Last Rate Last Dose    busPIRone (BUSPAR) tablet 5 mg  5 mg Oral Daily Kathie Delgado DO        levothyroxine (SYNTHROID) tablet 50 mcg  50 mcg Oral Daily Kathie Sandy, DO   50 mcg at 10/12/20 1053    PARoxetine (PAXIL) tablet 40 mg  40 mg Oral Daily Kathie Sandy, DO        sodium chloride flush 0.9 % injection 10 mL  10 mL Intravenous 2 times per day Kathie Sandy, DO        sodium chloride flush 0.9 % injection 10 mL  10 mL Intravenous PRN Kathie Sandy, DO        polyethylene glycol (GLYCOLAX) packet 17 g  17 g Oral Daily PRN Kathie Sandy, DO        promethazine (PHENERGAN) tablet 12.5 mg  12.5 mg Oral Q6H PRN Kathie Sandy, DO        [START ON 10/13/2020] enoxaparin (LOVENOX) injection 40 mg  40 mg Subcutaneous Daily Kathie Sandy, DO        acetaminophen (TYLENOL) tablet 650 mg  650 mg Oral Q4H PRN Kathie Sandy, DO        morphine (PF) injection 2 mg  2 mg Intravenous Q3H PRN Kathie Sandy, DO        Or    morphine (PF) injection 4 mg  4 mg Intravenous Q3H PRN Kathie Sandy, DO        bisacodyl (DULCOLAX) suppository 10 mg  10 mg Rectal Daily PRN Kathie Sandy, DO        pantoprazole (PROTONIX) injection 40 mg  40 mg Intravenous Daily Kathiefany Crider, DO   40 mg at 10/12/20 0859    cefTRIAXone (ROCEPHIN) 1 g IVPB in 50 mL D5W minibag  1 g Intravenous Q24H Kathie Sandy, DO   Stopped at 10/12/20 0930    albuterol (PROVENTIL) nebulizer solution 2.5 mg  2.5 mg Nebulization Q4H PRN Denece Rough, DO        polyethylene glycol (GLYCOLAX) packet 17 g  17 g Oral BID Denece Rough, DO        lactated ringers infusion   Intravenous Continuous Loy Palmer MD        sodium chloride flush 0.9 % injection 10 mL  10 mL Intravenous 2 times per day Loy Palmer MD        sodium chloride flush 0.9 % injection 10 mL  10 mL Intravenous PRN Loy Palemr MD        famotidine (PEPCID) injection 20 mg  20 mg Intravenous Once Loy Palmer MD        0.9 % sodium chloride infusion   Intravenous Continuous Denece Rough,  mL/hr at 10/12/20 1280  ceFAZolin (ANCEF) 2 g in dextrose 5 % 100 mL IVPB  2 g Intravenous On Call to [address] [place] [address]        melatonin ER tablet 1 mg  1 mg Oral QPM Gladies Laith, DO        acetaminophen (TYLENOL) tablet 650 mg  650 mg Oral Once Gladies Laith, DO        lidocaine 4 % external patch 1 patch  1 patch Transdermal Daily Kathie Delgado DO   1 patch at 10/12/20 0859    0.9 % sodium chloride infusion  1,000 mL Intravenous Continuous Kathie Delgado DO   Stopped at 10/12/20 2341       Allergies: Allergies   Allergen Reactions    No Known Allergies        Problem List:    Patient Active Problem List   Diagnosis Code    Closed displaced fracture of left femoral neck with malunion S72.002P    UTI (urinary tract infection) N39.0    Leukocytosis D72.829    Osteoarthritis M19.90    Elevated CK R74.8    Fall W19. XXXA    Prolonged QT interval R94.31    Hypothyroidism E03.9    Hematoma T14. 8XXA    GERD (gastroesophageal reflux disease) K21.9    Depression F32.9    DDD (degenerative disc disease) [id number]    Colitis K52.9    Bell's palsy G51.0    Acute cystitis N30.00    AK (actinic keratosis) L57.0    Anxiety F41.9    Backache M54.9    Sternal fracture S22.20XA    Scalp laceration S01. 01XA    Pulmonary contusion S27.329A    Neck pain M54.2    Narrowing of intervertebral disc space M99.79    Laceration of great toe S91.113A       Past Medical History:        Diagnosis Date    Thyroid disease        Past Surgical History:        Procedure Laterality Date    TOTAL THYROIDECTOMY         Social History:    Social History     Tobacco Use    Smoking status: Never Smoker    Smokeless tobacco: Never Used   Substance Use Topics    Alcohol use: Never     Frequency: Never                                Counseling given: Not Answered      Vital Signs (Current):   Vitals:    10/12/20 0708 10/12/20 0807 10/12/20 0912 10/12/20 0956   BP: (!) 146/63 (!) 147/68 (!) 151/71 (!) 155/75   Pulse: 81 80 88 87 Resp: 20 20 22 20   Temp:    99.1 °F (37.3 °C)   TempSrc:    Oral   SpO2: 94% 94% 94% 97%   Weight:       Height:                                                  BP Readings from Last 3 Encounters:   10/12/20 (!) 155/75       NPO Status:  mn+, see mar                                                                               BMI:   Wt Readings from Last 3 Encounters:   10/12/20 159 lb (72.1 kg)     Body mass index is 24.9 kg/m². CBC:   Lab Results   Component Value Date    WBC 16.4 10/11/2020    RBC 4.00 10/11/2020    HGB 12.5 10/11/2020    HCT 37.1 10/11/2020    MCV 92.7 10/11/2020    RDW 14.0 10/11/2020     10/11/2020       CMP:   Lab Results   Component Value Date     10/11/2020    K 4.0 10/11/2020     10/11/2020    CO2 22 10/11/2020    BUN 32 10/11/2020    CREATININE <0.5 10/11/2020    GFRAA >60 10/11/2020    AGRATIO 1.5 10/11/2020    LABGLOM >60 10/11/2020    GLUCOSE 142 10/11/2020    PROT 6.9 10/11/2020    CALCIUM 9.1 10/11/2020    BILITOT 0.8 10/11/2020    ALKPHOS 87 10/11/2020    AST 79 10/11/2020    ALT 36 10/11/2020       POC Tests: No results for input(s): POCGLU, POCNA, POCK, POCCL, POCBUN, POCHEMO, POCHCT in the last 72 hours.     Coags:   Lab Results   Component Value Date    PROTIME 13.5 10/11/2020    INR 1.16 10/11/2020    APTT 20.8 10/11/2020       HCG (If Applicable): No results found for: PREGTESTUR, PREGSERUM, HCG, HCGQUANT     ABGs: No results found for: PHART, PO2ART, RDJ8SFA, JPS0LNU, BEART, D7UHIMGL     Type & Screen (If Applicable):  No results found for: LABABO, LABRH    Drug/Infectious Status (If Applicable):  No results found for: HIV, HEPCAB    COVID-19 Screening (If Applicable):   Lab Results   Component Value Date    COVID19 Not Detected 10/11/2020         Anesthesia Evaluation  Patient summary reviewed and Nursing notes reviewed no history of anesthetic complications:   Airway: Mallampati: II  TM distance: >3 FB   Neck ROM: limited  Mouth opening: > = 3 FB

## 2020-10-12 NOTE — H&P
Inpatient Family Medicine Service History & Physical        PCP: Lorin Guzman    Date of Admission: 10/11/2020    Date of Service: Pt seen/examined on 10/11/2020 and Admitted to Inpatient with expected LOS greater than two midnights due to medical therapy. Chief Complaint: Fall w/ hip pain for 1 day      History Of Present Illness:      81 y.o. female with PMH significant for depression and Hypothyroidism, who presented to [facility] with a fall which believes occurred around noon today. Pt states she had a BM on the toilet and then woke up in the floor. She is unsure if she hit her head, but noted that her right hip was hurting and she could not get up. Her family tried to reach her and couldn't so they called the EMS, who found her awake lying in the bathroom floor. She lives at home by herself. Her son regularly calls and visits. She denies any fever, chills, N/V, chest pain, SOB, abd pain, dysuria, and diarrhea. She denies any known COVID exposure. In the ED:  CT of pelvis showed comminuted left femoral neck fracture  CT head showed no intracranial abnormality  CT neck no acute cervical spine abnormality  CXR showed no acute posttraumatic abnormality    EKG: NSR, 88 bpm, Prolonged QT, Nonspecific T wave abnormality, no prior EKG for comparison    UA: +Nitrites, +Leukocyte esterase, +Blood, +Protein, WBC , RBC 11-20, bacteria 4+    WBC: 16.4  Total CK: 2872  BUN: 32  Cr: 0.5  Troponin: < 0.01    Rec'd Dose of ceftriaxone, 1000 ml of NS, Ortho notified by Talita Stearns and plan for OR in the AM, COVID testing ordered, Type and Screen ordered, Urine Cx ordered, Ferrari catheter ordered        Past Medical History:      Depression  Hypothyroidism    Past Surgical History:      Cholecystectomy  Hysterectomy    Medications Prior to Admission:      Prior to Admission medications    Medication Sig Start Date End Date Taking?  Authorizing Provider   melatonin 3 MG TABS tablet Take 6 mg by mouth gross sensory deficits, Unable to perform full motion exam due to left hip fracture, Palpable DPs and PTs  Psychiatric:  Alert and oriented, thought content appropriate, normal insight  Capillary Refill: Brisk,< 3 seconds   Peripheral Pulses: +2 palpable, equal bilaterally       Labs:     Recent Labs     10/11/20  1935   WBC 16.4*   HGB 12.5   HCT 37.1        Recent Labs     10/11/20  1935      K 4.0      CO2 22   BUN 32*   CREATININE <0.5*   CALCIUM 9.1     Recent Labs     10/11/20  1935   AST 79*   ALT 36   BILITOT 0.8   ALKPHOS 87     Recent Labs     10/11/20  1935   INR 1.16*     Recent Labs     10/11/20  1935   CKTOTAL 2,872*   TROPONINI <0.01       Urinalysis:      Lab Results   Component Value Date    NITRU POSITIVE 10/11/2020    WBCUA  10/11/2020    BACTERIA 4+ 10/11/2020    RBCUA 11-20 10/11/2020    BLOODU MODERATE 10/11/2020    SPECGRAV >=1.030 10/11/2020    GLUCOSEU Negative 10/11/2020       Radiology:     CXR: I have reviewed the CXR with the following interpretation: no acute posttraumatic abnormality  EKG:  I have reviewed the EKG with the following interpretation: NSR, 88 bpm, Prolonged QT, Nonspecific T wave abnormality, no prior EKG for comparison    CT PELVIS WO CONTRAST Additional Contrast? None   Final Result   Fracture of the left femoral neck as described above. Mild degenerative change about the left hip. CT Head WO Contrast   Final Result   No acute intracranial abnormality visualized. CT Cervical Spine WO Contrast   Final Result   1. No acute abnormality of the cervical spine. 2. Multilevel degenerative change of the cervical spine. 3. Partially imaged thyroid is enlarged and heterogeneous. Recommend   nonemergent dedicated follow-up thyroid ultrasound as per below. RECOMMENDATIONS:   Managing Incidental Thyroid Nodule Detected at CT or MRI or US1.       Further evaluation by thyroid Ultrasound recommended for these incidental   nodules: ~Age 18 years or less - Any nodule. ~Age 21-27 years old - Nodule 1 cm in size or greater. ~Age 35 years or more - Nodule 1.5 cm in size or greater2. Follow up thyroid ultrasound also recommend in these scenarios:      ~Solitary nodule with high risk imaging features (locally invasive nodule   or suspicious lymph nodes). ~Any nodule in a heterogeneous enlarged thyroid gland. NO further imaging is recommended in the following scenarios:      ~No f/u imaging is recommended for ITNs not meeting the above criteria.      ~No US or f/u recommended for ITNs without high risk features or with   limited life expectancy or significant co-morbidities, unless clinically   warranted. Note: These recommendations do not apply if increased risk for thyroid cancer   or symptomatic thyroid disease. Recommendations for f/u of Incidental Thyroid Nodules (ITN) found on CT, MR,   NM and Extrathyroidal US are based upon the ACR white paper and [facility] 3-tiered   system for managing ITNs:J Am Deja Radiol. 2015 Feb;12(2): 143-50         XR HIP 2-3 VW W PELVIS RIGHT   Final Result   No acute posttraumatic abnormality detected. Chronic appearing fracture deformity of the left hip. XR CHEST PORTABLE   Final Result   No acute posttraumatic abnormality detected. Chronic appearing fracture deformity of the left hip.              ASSESSMENT:    Active Hospital Problems    Diagnosis Date Noted    Closed displaced fracture of left femoral neck with malunion [S72.002P] 10/12/2020    UTI (urinary tract infection) [N39.0] 10/12/2020    Leukocytosis [D72.829] 10/12/2020    Rhabdomyolysis [M62.82] 10/12/2020    Elevated CK [R74.8] 10/12/2020         PLAN:    Left femoral neck fracture   -CT of pelvis showed comminuted left femoral neck fracture  -Ortho consulted  -Plan for OR on 10/12/2020  -COVID test ordered  -Type and Screen ordered  -PT/INR: 13.5/1.16  -Pain control with morphine  -SCDs, will start lovenox after surgery  -Bed rest   -NPO until after procedure  -Case management consulted for assistance with DC planning, as pt lives at home alone and will not likely be able to return home right away    Fall 2/2 to vasovagal vs UTI vs arrhythmia/ cardiac cause  -This is likely vasovagal, but will need to rule out other possible causes  -CT head showed no intracranial abnormality  -CT neck no acute cervical spine abnormality  -CXR showed no acute posttraumatic abnormality  -EKG: NSR, 88 bpm, Prolonged QT, Nonspecific T wave abnormality, no prior EKG for comparison  -UA: +Nitrites, +Leukocyte esterase, +Blood, +Protein, WBC , RBC 11-20, bacteria 4+  -Troponin <0.01  -Monitor tele  -Bed rest    UTI  -UA: +Nitrites, +Leukocyte esterase, +Blood, +Protein, WBC , RBC 11-20, bacteria 4+  -Rec'd Ceftriaxone and NS bolus in ED  -Continue Ceftriaxone  -Continue NS  -Follow up urine cx results    Rhabdomyolysis with elevated CK 2/2 to fall  -Total CK: 2872  -BUN: 32  -Cr: 0.5  -Monitor CMP  -Phosphorus ordered  -Uric Acid ordered  -Repeat CK ordered  -No signs of compartment syndrome  -Continue IVF    Prolonged QT Interval  -EKG: NSR, 88 bpm, Prolonged QT, Nonspecific T wave abnormality, no prior EKG for comparison  -QTc is 476, very mild  -Pt unclear if she was taking any other medications at home, stating her nurse helps her with her medications  -She denies any CP, SOB, dizziness or palpitations  -Troponin <0.01  -Avoid any agents which could worsen prolonged QT interval  -Monitor tele  -Monitor CMP  -Magnesium lab ordered  -Consider cardio consult if there is anything concerning on tele or if pt notes any new symptoms    Leukocytosis 2/2 to UTI and/or trauma  -WBC: 16.4  -Continue Ceftriaxone  -Monitor AM CBC    Hypothyroidism  -Continue home Synthroid 50 mcg  -TSH w/ reflex ordered    Depression and Anxiety  -Continue home Paxil and buspirone - neither prolong QT      DVT Prophylaxis: SCDs  Diet: Diet NPO Effective Now  Code Status: Full Code  PT/OT Eval Status: Not yet consulted    Dispo - Inpatient, pending left hip surgery    This patient was seen and evaluated with resident team and attending, Dr. Junaid Partida, DO    Family Medicine Resident PGY3

## 2020-10-12 NOTE — ED NOTES
Spoke with surgery and gave report. Called patient's son and updated him. He is on his way to the ED and will be present prior to surgery.       Gonzalo Payment, [id number] [facility]  10/12/20 9009

## 2020-10-12 NOTE — ANESTHESIA POSTPROCEDURE EVALUATION
Department of Anesthesiology  Postprocedure Note    Patient: Leora Muñoz  MRN: [id number]  YOB: 1935  Date of evaluation: 10/12/2020  Time:  3:21 PM     Procedure Summary     Date:  10/12/20 Room / Location:  [facility]    Anesthesia Start:  1205 Anesthesia Stop:  5627    Procedure:  LEFT HIP HEMIARTHROPLASTY (Left ) Diagnosis:       Closed fracture of left hip, initial encounter ([facility].)      (LEFT HIP FRACTURE)    Surgeon:  Norma Dorsey MD Responsible Provider:  Avila Estrella MD    Anesthesia Type:  general ASA Status:  3          Anesthesia Type: general    Emilia Phase I: Emilia Score: 9    Emilia Phase II:      Last vitals: Reviewed and per EMR flowsheets.      Vitals:    10/12/20 1425 10/12/20 1430 10/12/20 1435 10/12/20 1440   BP: (!) 142/63 (!) 143/62 (!) 127/59 (!) 138/56   Pulse: 87 87 82 79   Resp:       Temp:       TempSrc:       SpO2: 91% 92% 94% 94%   Weight:       Height:         Anesthesia Post Evaluation    Patient location during evaluation: bedside  Patient participation: complete - patient participated  Level of consciousness: awake and alert  Airway patency: patent  Nausea & Vomiting: no nausea  Complications: no  Cardiovascular status: hemodynamically stable  Respiratory status: acceptable  Hydration status: euvolemic

## 2020-10-12 NOTE — PROGRESS NOTES
Inpatient Family Medicine Progress Note      PCP: Saintclair Lecher    Date of Admission: 10/11/2020    Chief Complaint:  Fall w/ Left Femoral Neck Fracture s/p hemiarthoplasty    Hospital Course:     81 y.o. female with PMH significant for Dementia, Depression and Hypothyroidism, who presented to [facility] on 10/11/2020 with a fall which believes occurred around noon. Pt states she had a BM on the toilet and then woke up in the floor. She is unsure if she hit her head, but noted that her right hip was hurting and she could not get up. EMS found her awake lying in the bathroom floor and brought her to ED. In the ED:  CT of pelvis showed comminuted left femoral neck fracture  CT head showed no intracranial abnormality  CT neck no acute cervical spine abnormality  CXR showed no acute posttraumatic abnormality     EKG: NSR, 88 bpm, Prolonged QT, Nonspecific T wave abnormality, no prior EKG for comparison     UA: +Nitrites, +Leukocyte esterase, +Blood, +Protein, WBC , RBC 11-20, bacteria 4+     WBC: 16.4  Total CK: 2872  BUN: 32  Cr: 0.5  Troponin: < 0.01     Rec'd Dose of ceftriaxone, 1000 ml of NS    On the floor, ceftriaxone and fluids continued. Surgery on 10/12/2020  LEFT HIP HEMIARTHROPLASTY FOR FEMORAL NECK FRACTURE     Surgery went well and patient had no complaints following surgery. Subjective:     Pt working with PT/OT this am.  States she couldn't eat dinner last night due to nausea. States she is in 10/10 pain. Pt may not understand that she has prn medicine for those issues. Pt had not asked for either. Discussed with nurse about scheduling promethazine before meals, and Oxy 5 BID temporarily while the patient recovers if she is in 10/10 pain without knowing she could ask for medicine. This could be a result of her dementia. Tylenol stopped due to increase in Liver Function Tests. Vitals stable. CK trending down.      Medications:  Reviewed    Infusion Medications    sodium chloride 100 mL/hr at 10/12/20 7737    sodium chloride 125 mL/hr at 10/13/20 0231    sodium chloride Stopped (10/12/20 0651)     Scheduled Medications    phosphorus  250 mg Oral 4x Daily    potassium chloride  20 mEq Oral BID    busPIRone  5 mg Oral Daily    levothyroxine  50 mcg Oral Daily    PARoxetine  40 mg Oral Daily    pantoprazole  40 mg Intravenous Daily    cefTRIAXone (ROCEPHIN) IV  1 g Intravenous Q24H    polyethylene glycol  17 g Oral BID    melatonin ER  1 mg Oral QPM    sodium chloride flush  10 mL Intravenous 2 times per day    sennosides-docusate sodium  1 tablet Oral BID    enoxaparin  40 mg Subcutaneous Daily    lidocaine  1 patch Transdermal Daily     PRN Meds: polyethylene glycol, promethazine **OR** [DISCONTINUED] ondansetron, morphine **OR** morphine, bisacodyl, albuterol, sodium chloride flush, magnesium hydroxide, oxyCODONE **OR** oxyCODONE      Intake/Output Summary (Last 24 hours) at 10/13/2020 1059  Last data filed at 10/13/2020 0234  Gross per 24 hour   Intake 1100 ml   Output 1250 ml   Net -150 ml       Physical Exam Performed:    /62   Pulse 92   Temp 98 °F (36.7 °C) (Oral)   Resp 16   Ht 5' 7\" (1.702 m)   Wt 159 lb (72.1 kg)   SpO2 90%   BMI 24.90 kg/m²     General appearance:  Pt sitting up in bed with PT/OT making painful grimaces with small movements with them    HEENT:  Normal cephalic, atraumatic without obvious deformity. Extra ocular muscles intact. Conjunctivae clear. Neck: Supple. Trachea midline. Respiratory:  Normal respiratory effort. CTABL  Cardiovascular:  Regular rate and rhythm with normal S1/S2   Abdomen: Soft, non-tender, non-distended    Musculoskeletal:  No clubbing, cyanosis or edema bilaterally.  Pt able to sit up and transfer to chair. + Left hip pain with movements   Skin: Warm and dry  Neurologic:  No focal deficits, no gross sensory deficits  Psychiatric:  Alert and oriented, thought content appropriate, normal cm in size or greater. ~Age 35 years or more - Nodule 1.5 cm in size or greater2. Follow up thyroid ultrasound also recommend in these scenarios:      ~Solitary nodule with high risk imaging features (locally invasive nodule   or suspicious lymph nodes). ~Any nodule in a heterogeneous enlarged thyroid gland. NO further imaging is recommended in the following scenarios:      ~No f/u imaging is recommended for ITNs not meeting the above criteria.      ~No US or f/u recommended for ITNs without high risk features or with   limited life expectancy or significant co-morbidities, unless clinically   warranted. Note: These recommendations do not apply if increased risk for thyroid cancer   or symptomatic thyroid disease. Recommendations for f/u of Incidental Thyroid Nodules (ITN) found on CT, MR,   NM and Extrathyroidal US are based upon the ACR white paper and [facility] 3-tiered   system for managing ITNs:J Am Deja Radiol. 2015 Feb;12(2): 143-50         XR HIP 2-3 VW W PELVIS RIGHT   Final Result   No acute posttraumatic abnormality detected. Chronic appearing fracture deformity of the left hip. XR CHEST PORTABLE   Final Result   No acute posttraumatic abnormality detected. Chronic appearing fracture deformity of the left hip. Assessment/Plan:    Active Hospital Problems    Diagnosis Date Noted    Elevated LFTs [R79.89] 10/13/2020    Closed displaced fracture of left femoral neck with malunion [S72.002P] 10/12/2020    UTI (urinary tract infection) [N39.0] 10/12/2020    Leukocytosis [D72.829] 10/12/2020    Elevated CK [R74.8] 10/12/2020    Fall [W19. XXXA] 10/12/2020    Prolonged QT interval [R94.31] 10/12/2020    Osteoarthritis [M19.90] 06/30/2014        PLAN:     Left femoral neck fracture   -CT of pelvis showed comminuted left femoral neck fracture  - Surgery 10/12/2020  Plan of care per Ortho  -Status post left hip hemiarthroplasty  -Okay for 50% weightbearing left lower extremity with posterior hip precautions  -Pain control, postop antibiotics  - Bowel Regimen  -PT/OT  -Discharge planning pending progress  -Case Management c/s for Rehab on discharge  > Oxy 5 BID for 10/10 pain, Tylenol stopped due to elevated LFTs  > Promethazine 30 mins before meals for nausea    Fall 2/2 to vasovagal vs UTI   - Low risk for arrhythmia/ cardiac cause  -This is likely vasovagal, but will need to rule out other possible causes  -CT head showed no intracranial abnormality  -CT neck no acute cervical spine abnormality  -CXR showed no acute posttraumatic abnormality  -Troponin <0.01  -Monitor tele     UTI  -UA: +Nitrites, +Leukocyte esterase, +Blood, +Protein, WBC , RBC 11-20, bacteria 4+  -Rec'd Ceftriaxone and NS bolus in ED  -Continue Ceftriaxone  -Continue NS  -Follow up urine cx results  - WBCs trending down     Rhabdomyolysis with elevated CK 2/2 to fall  -Total CK: 2872, trending down  -BUN: 32  -Cr: 0.5  -Monitor CMP  -Phosphorus sl low  -Uric Acid neg  -Repeat CK ordered  -Continue IVF     Prolonged QT Interval  -EKG: NSR, 88 bpm, Prolonged QT, Nonspecific T wave abnormality, no prior EKG for comparison  -QTc is 476, very mild  -She denies any CP, SOB, dizziness or palpitations  -Troponin <0.01  -Avoid any agents which could worsen prolonged QT interval  -Monitor tele  -Monitor CMP  -Magnesium/ Phos lab ordered  -Consider cardio c/s  - K+ low, repleted  - Phos low repleted     Leukocytosis 2/2 to UTI and/or trauma  -WBC: 16.4, trending down  -Continue Ceftriaxone  -Monitor AM CBC    Anemia 2/2 Blood Loss  - Monitor CBC     Hypothyroidism  -Continue home Synthroid 50 mcg  -TSH w/ reflex ordered, pending     Depression and Anxiety  -Continue home Paxil and Buspirone (neither prolong QT)    Dementia  - Son mentioned that her memory has been getting worse since last year when she fell  - She is mostly alert and oriented, but can become confused at times    Bowel Regimen: Miralax BID and Senokot-S BID   DVT Prophylaxis: Lovenox   Diet: Regular Diet  Code Status: Full Code  PT/OT Eval Status: Pending     Dispo - Inpatient    This patient was seen and evaluated with the resident team and with the attending, Dr. Mark Gooden.       Jaspal Trinidad, DO  Family Medicine Resident PGY1

## 2020-10-12 NOTE — ED NOTES
[facility] @ 2027  RE: left femoral neck fracture  Dr. Kye Mack called back @ [phone]  10/11/20 2221

## 2020-10-12 NOTE — BRIEF OP NOTE
Brief Postoperative Note      Patient: Micheal Alexis  YOB: 1935  MRN: [id number]    Date of Procedure: 10/12/2020    Pre-Op Diagnosis: LEFT FEMORAL NECK FRACTURE    Post-Op Diagnosis: Same       Procedure(s):  1. LEFT HIP HEMIARTHROPLASTY FOR FEMORAL NECK FRACTURE (31379)  2. INTRAOPERATIVE X-RAY LEFT HIP AND PELVIS (51857)    Surgeon(s):  Rahel Grace MD    Assistant:  Surgical Assistant: Darlene Shahid    Anesthesia: General    Estimated Blood Loss (mL): 454    Complications: None    Specimens:   * No specimens in log *    Implants:  Implant Name Type Inv.  Item Serial No.  Lot No. LRB No. Used Action   IMPL HIP STEM FEM STD OFFST/TAPRLOC [id number] Hip IMPL HIP STEM FEM STD OFFST/TAPRLOC [id number]  [employer] [id number] Left 1 Implanted   IMPL HIP FEM HEAD MOD COBLT CHROME 28MM Hip IMPL HIP FEM HEAD MOD COBLT CHROME 28MM  [employer] [id number] Left 1 Implanted   IMPL HIP FEM SHLL CUP ACET [id number] Hip IMPL HIP FEM SHLL CUP ACET [id number]  [employer] [id number] Left 1 Implanted         Drains:   Urethral Catheter Double-lumen;Temperature probe 16 fr (Active)   Catheter Indications Perioperative use in selected surgeries including but not limited to urologic, pelvic or need for intraoperative monitoring of urinary output due to prolonged surgery, large volume infusion or need for diuretic therapy in surgery 10/11/20 2233   Site Assessment Red 10/11/20 2233   Urine Color Tea 10/11/20 2233   Urine Appearance Cloudy 10/11/20 2233   Urine Odor Malodorous 10/11/20 2233   Output (mL) 300 mL 10/11/20 2233     Antibiotics: 2 g ancef IV prior to incision    Findings: Displaced acute left femoral neck fracture    Electronically signed by Rahel Grace MD on 10/12/2020 at 2:15 PM (2) well flexed

## 2020-10-12 NOTE — PROGRESS NOTES
Gave bedside report to floor nurse and we performed a 4 eye assessment. 4 Eyes Skin Assessment     The patient is being assess for   Post-Op Surgical    I agree that 2 RN's have performed a thorough Head to Toe Skin Assessment on the patient. ALL assessment sites listed below have been assessed. Areas assessed by both nurses:   [x]   Head, Face, and Ears   [x]   Shoulders, Back, and Chest, Abdomen  [x]   Arms, Elbows, and Hands   [x]   Coccyx, Sacrum, and Ischium  [x]   Legs, Feet, and Heels        Redness on sacrum, protective mepelex in place. Heels reddened on pillow to off load. **SHARE this note so that the co-signing nurse is able to place an eSignature**    Co-signer eSignature: Electronically signed by Keiko Wilkerson RN on 10/12/20 at 7:13 PM EDT    Does the Patient have Skin Breakdown?   No          James Prevention initiated:  Yes   Wound Care Orders initiated:  NA      [facility] nurse consulted for Pressure Injury (Stage 3,4, Unstageable, DTI, NWPT, Complex wounds)and New or Established Ostomies:  NA      Primary Nurse eSignature: Electronically signed by Aniyah Granger RN on 10/12/20 at 4:20 PM EDT

## 2020-10-12 NOTE — ED PROVIDER NOTES
[address] [place] [address]  ED  [address] [address]  Dept: [phone]  Dept Fax: [phone]  Loc: [facility]        This patient was seen and evaluated per myself in conjunction with ED attending Dr. Roland Machuca. CHIEF COMPLAINT    Chief Complaint   Patient presents with    Fall     seen last night around 6p; family could not get ahold of her; EMS found pt on ground in bathroom; c/o right hip pain; 100mcg Fentanyl given in route. HPI    Shukri Donald is a 81 y.o. female who presents with a syncopal episode. Onset was earlier in the day according to the patient she thinks that this occurred around noon. She was last seen around 6 PM by family. According to report from EMS family could not get a hold of her and called EMS. EMS had to break down the door, found her lying on the bathroom floor. She states that she was there for several hours. She could not get up. She was able to tell me that she was on the toilet at the time of the syncopal episode, trying to have a bowel movement. She states that she lost consciousness and woke up on the bathroom floor and was only able to get up. The duration of the syncopal episode was unknown but the patient states \"it could have been long. \"  She is endorsing some right hip pain. Does not think she hit her head but is not certain. Patient came to the ED for further evaluation and treatment via EMS. REVIEW OF SYSTEMS    Cardiac: no palpitations, no chest pain  Respiratory: no SOB, no new cough  Neurologic: see HPI, no headache, no double vision  GI: no vomiting, no diarrhea  Hematologic: no hematochezia, no hemoptysis  General: no fever, no chills  Musculoskeletal: see HPI  All other systems reviewed and are negative. Samantha Chambers PAST MEDICAL & SURGICAL HISTORY    History reviewed. No pertinent past medical history. History reviewed. No pertinent surgical history.     CURRENT MEDICATIONS  (may include discharge medications prescribed in the ED)  Current Outpatient Rx   Medication Sig Dispense Refill    melatonin 3 MG TABS tablet Take 6 mg by mouth every evening      PARoxetine (PAXIL) 40 MG tablet Take 1 tablet by mouth daily      omeprazole (PRILOSEC) 20 MG delayed release capsule Take 1 capsule by mouth daily      levothyroxine (SYNTHROID) 50 MCG tablet Take 1 tablet by mouth daily      diclofenac (VOLTAREN) 75 MG EC tablet Take 1 tablet by mouth 2 times daily      busPIRone (BUSPAR) 5 MG tablet Take 1 tablet by mouth daily      VENTOLIN  (90 Base) MCG/ACT inhaler Take 2 puffs by mouth 4 times daily as needed         ALLERGIES    No Known Allergies    SOCIAL & FAMILY HISTORY    Social History     Socioeconomic History    Marital status:      Spouse name: None    Number of children: None    Years of education: None    Highest education level: None   Occupational History    None   Social Needs    Financial resource strain: None    Food insecurity     Worry: None     Inability: None    Transportation needs     Medical: None     Non-medical: None   Tobacco Use    Smoking status: Never Smoker    Smokeless tobacco: Never Used   Substance and Sexual Activity    Alcohol use: Never     Frequency: Never    Drug use: Never    Sexual activity: None   Lifestyle    Physical activity     Days per week: None     Minutes per session: None    Stress: None   Relationships    Social connections     Talks on phone: None     Gets together: None     Attends Jewish service: None     Active member of club or organization: None     Attends meetings of clubs or organizations: None     Relationship status: None    Intimate partner violence     Fear of current or ex partner: None     Emotionally abused: None     Physically abused: None     Forced sexual activity: None   Other Topics Concern    None   Social History Narrative    None     History reviewed.  No pertinent family history. PHYSICAL EXAM    VITAL SIGNS: BP (!) 143/57   Pulse 86   Temp 98 °F (36.7 °C)   Resp 23   SpO2 96%    Constitutional:  Well developed, well nourished, no acute distress  Eyes: Pupils equally round and reactive to light, sclera nonicteric  HENT:  Atraumatic, see integument, moist mucus membranes no babin signs or raccoon eyes, no hemotympanum bilaterally  Neck: supple, no JVD, no posterior neck tenderness, no meningismus  Respiratory:  Lungs clear to auscultation bilaterally, no retractions   Cardiovascular:  Regular rate, no murmurs  GI:  Soft, nontender, normal bowel sounds  Musculoskeletal:  No edema, no acute deformities, legs are not shortened or rotated but the patient is having increased amount of pain with attempt of passive range of motion of bilateral hips  Integument:  Skin warm and dry, no petechiae, no open wounds or sores, skin is warm dry and intact  Neurologic: Awake, alert, oriented x3, no aphasia, no slurred speech, CN II-XII intact, normal finger to nose test bilaterally, unable to perform range of motion on hips bilaterally due to the pain the patient is endorsing   vascular: Radial and DP pulses 2+ and equal bilaterally  Psychiatric: Cooperative, pleasant affect     EKG   Please see the physician's note for EKG interpretation.       LABS  Results for orders placed or performed during the hospital encounter of 10/11/20   CBC Auto Differential   Result Value Ref Range    WBC 16.4 (H) 4.0 - 11.0 K/uL    RBC 4.00 4.00 - 5.20 M/uL    Hemoglobin 12.5 12.0 - 16.0 g/dL    Hematocrit 37.1 36.0 - 48.0 %    MCV 92.7 80.0 - 100.0 fL    MCH 31.2 26.0 - 34.0 pg    MCHC 33.6 31.0 - 36.0 g/dL    RDW 14.0 12.4 - 15.4 %    Platelets 948 815 - 167 K/uL    MPV 9.7 5.0 - 10.5 fL    Neutrophils % 71.0 %    Lymphocytes % 18.6 %    Monocytes % 9.2 %    Eosinophils % 0.7 %    Basophils % 0.5 %    Neutrophils Absolute 11.6 (H) 1.7 - 7.7 K/uL    Lymphocytes Absolute 3.0 1.0 - 5.1 K/uL    Monocytes Absolute 1.5 (H) 0.0 - 1.3 K/uL    Eosinophils Absolute 0.1 0.0 - 0.6 K/uL    Basophils Absolute 0.1 0.0 - 0.2 K/uL   Comprehensive Metabolic Panel   Result Value Ref Range    Sodium 137 136 - 145 mmol/L    Potassium 4.0 3.5 - 5.1 mmol/L    Chloride 102 99 - 110 mmol/L    CO2 22 21 - 32 mmol/L    Anion Gap 13 3 - 16    Glucose 142 (H) 70 - 99 mg/dL    BUN 32 (H) 7 - 20 mg/dL    CREATININE <0.5 (L) 0.6 - 1.2 mg/dL    GFR Non-African American >60 >60    GFR African American >60 >60    Calcium 9.1 8.3 - 10.6 mg/dL    Total Protein 6.9 6.4 - 8.2 g/dL    Alb 4.1 3.4 - 5.0 g/dL    Albumin/Globulin Ratio 1.5 1.1 - 2.2    Total Bilirubin 0.8 0.0 - 1.0 mg/dL    Alkaline Phosphatase 87 40 - 129 U/L    ALT 36 10 - 40 U/L    AST 79 (H) 15 - 37 U/L    Globulin 2.8 g/dL   Troponin   Result Value Ref Range    Troponin <0.01 <0.01 ng/mL   Urinalysis Reflex to Culture    Specimen: Urine, indwelling catheter   Result Value Ref Range    Color, UA Yellow Straw/Yellow    Clarity, UA SL CLOUDY (A) Clear    Glucose, Ur Negative Negative mg/dL    Bilirubin Urine Negative Negative    Ketones, Urine 15 (A) Negative mg/dL    Specific Gravity, UA >=1.030 1.005 - 1.030    Blood, Urine MODERATE (A) Negative    pH, UA 6.0 5.0 - 8.0    Protein, UA 30 (A) Negative mg/dL    Urobilinogen, Urine 0.2 <2.0 E.U./dL    Nitrite, Urine POSITIVE (A) Negative    Leukocyte Esterase, Urine SMALL (A) Negative    Microscopic Examination YES     Urine Type NotGiven    CK   Result Value Ref Range    Total CK 2,872 (H) 26 - 192 U/L   Protime-INR   Result Value Ref Range    Protime 13.5 (H) 10.0 - 13.2 sec    INR 1.16 (H) 0.86 - 1.14   APTT   Result Value Ref Range    aPTT 20.8 (L) 24.2 - 36.2 sec   EKG 12 Lead   Result Value Ref Range    Ventricular Rate 88 BPM    Atrial Rate 88 BPM    P-R Interval 164 ms    QRS Duration 84 ms    Q-T Interval 394 ms    QTc Calculation (Bazett) 476 ms    P Axis 56 degrees    R Axis -26 degrees    T Axis 12 degrees    Diagnosis Normal sinus rhythmVoltage criteria for left ventricular hypertrophyNonspecific T wave abnormalityProlonged QTAbnormal ECGNo previous ECGs available         RADIOLOGY  CT PELVIS WO CONTRAST Additional Contrast? None   Final Result   Fracture of the left femoral neck as described above. Mild degenerative change about the left hip. CT Head WO Contrast   Final Result   No acute intracranial abnormality visualized. CT Cervical Spine WO Contrast   Final Result   1. No acute abnormality of the cervical spine. 2. Multilevel degenerative change of the cervical spine. 3. Partially imaged thyroid is enlarged and heterogeneous. Recommend   nonemergent dedicated follow-up thyroid ultrasound as per below. RECOMMENDATIONS:   Managing Incidental Thyroid Nodule Detected at CT or MRI or US1. Further evaluation by thyroid Ultrasound recommended for these incidental   nodules:      ~Age 18 years or less - Any nodule. ~Age 21-27 years old - Nodule 1 cm in size or greater. ~Age 35 years or more - Nodule 1.5 cm in size or greater2. Follow up thyroid ultrasound also recommend in these scenarios:      ~Solitary nodule with high risk imaging features (locally invasive nodule   or suspicious lymph nodes). ~Any nodule in a heterogeneous enlarged thyroid gland. NO further imaging is recommended in the following scenarios:      ~No f/u imaging is recommended for ITNs not meeting the above criteria.      ~No US or f/u recommended for ITNs without high risk features or with   limited life expectancy or significant co-morbidities, unless clinically   warranted. Note: These recommendations do not apply if increased risk for thyroid cancer   or symptomatic thyroid disease. Recommendations for f/u of Incidental Thyroid Nodules (ITN) found on CT, MR,   NM and Extrathyroidal US are based upon the ACR white paper and [facility] 3-tiered   system for managing ITNs:J Am Deja Radiol.  2015 Feb;12(2): 143-50         XR HIP 2-3 VW W PELVIS RIGHT   Final Result   No acute posttraumatic abnormality detected. Chronic appearing fracture deformity of the left hip. XR CHEST PORTABLE   Final Result   No acute posttraumatic abnormality detected. Chronic appearing fracture deformity of the left hip. ED COURSE & MEDICAL DECISION MAKING    Pertinent Labs & Imaging studies reviewed and interpreted. (See chart for details)  See chart for details of medications given during the ED stay. Vitals:    10/11/20 1929 10/11/20 2040 10/11/20 2110 10/11/20 2140   BP: (!) 155/75 (!) 140/73 (!) 153/69 (!) 143/57   Pulse: 93 89 82 86   Resp: 22 21 20 23   Temp:       TempSrc: Oral      SpO2: 97% 96% 99% 96%       Differential Diagnosis: Cardiac arrhythmia, drop attack from a subarachnoid hemorrhage, sepsis, dehydration, vasovagal syncope, other    CRITICAL CARE NOTE:  There was a high probability of clinically significant life-threatening deterioration of the patient's condition requiring my urgent intervention. Total critical care time was at least 20 minutes. This includes vital sign monitoring, pulse oximetry monitoring, telemetry monitoring, clinical response to the IV medications, reviewing the nursing notes, consultation time, dictation/documentation time, and interpretation of the labwork. This excludes any separately billable procedures performed. The critical care time above also includes time spent obtaining history from electronic chart, as the patient was unable to provide the history AND the history obtained was directly relevant to the care of the patient. Patient is afebrile and nontoxic in appearance. Labs reveal a leukocytosis of 16.4, no anemia. Her care everywhere review of her home medications does not show any anticoagulants, however Dr. Thurston Hams with orthopedics requested for me to send these. We will also send a type and screen.     Metabolic panel showed BUN of 32, consistent with dehydration. IV fluids will be given. Patient CK is elevated at 2872 consistent with rhabdo due to inability to get up off of the floor after her syncope and fall episode. Troponin is negative, please see my attendings note for EKG interpretation. Urinalysis is positive for nitrites with small amount of leukocytes. I have a strong suspicion that her leukocytosis is derived from her UTI. She has no clinical signs of sepsis. Place her on a course of Rocephin for UTI. CXR findings as above. CT findings as above. Given CT findings I did consult orthopedic surgery. She is to remain n.p.o. after midnight, COVID swab will be obtained rapidly for presumed OR tomorrow for the ORIF of the left femoral neck fracture. Dr. Zack Gamez with orthopedics. Patient is in the low-risk group for serious outcome based on the [facility] Syncope Rule, as patient does not have a history of CHF, hematocrit is above 30%, EKG is normal, patient is not complaining of shortness of breath, and patient's systolic blood pressure was above 90 mmHg at triage. Patient is in the low-risk group for 30-day serious adverse event based on the [place] Syncope Risk Score, as the patient does not have a history of heart disease (CAD, a-fib, a-flutter, CHF, valvular disease), systolic blood pressure was not <90 or >180 on any reading, troponin was not elevated, there is no abnormal QRS axis (<-30 degrees or >100 degrees), QRS duration is not >130 ms, QTc is not >480 ms, and clinical impression was not consistent with cardiac syncope (no sudden syncope, lack of prodrome, preceding palpitations, or injuries suggesting sudden fall). Consultation with hospitalist at 2300: I contacted the hospitalist, patient's primary care provider does admit via a resident service under Dr. Christopher Blair; they did agreed to admit patient and write orders for admission. FINAL IMPRESSION    1. Syncope and collapse    2. Hip pain    3. Closed fracture of neck of left femur, initial encounter ([facility] [address]    4. Traumatic rhabdomyolysis, initial encounter ([facility] [address]    5.  Urinary tract infection without hematuria, site unspecified        PLAN  Admission        (Please note that this note was completed with a voice recognition program.  Every attempt was made to edit the dictations, but inevitably there remain words that are mis-transcribed.)        Rina Navarro, APRN - CNP  10/11/20 2894

## 2020-10-13 PROBLEM — R79.89 ELEVATED LFTS: Status: ACTIVE | Noted: 2020-10-13

## 2020-10-13 LAB
A/G RATIO: 1.3 (ref 1.1–2.2)
ALBUMIN SERPL-MCNC: 3.2 G/DL (ref 3.4–5)
ALP BLD-CCNC: 94 U/L (ref 40–129)
ALT SERPL-CCNC: 50 U/L (ref 10–40)
ANION GAP SERPL CALCULATED.3IONS-SCNC: 9 MMOL/L (ref 3–16)
AST SERPL-CCNC: 70 U/L (ref 15–37)
BASOPHILS ABSOLUTE: 0.1 K/UL (ref 0–0.2)
BASOPHILS RELATIVE PERCENT: 0.4 %
BILIRUB SERPL-MCNC: 0.7 MG/DL (ref 0–1)
BUN BLDV-MCNC: 17 MG/DL (ref 7–20)
CALCIUM SERPL-MCNC: 8 MG/DL (ref 8.3–10.6)
CHLORIDE BLD-SCNC: 107 MMOL/L (ref 99–110)
CO2: 21 MMOL/L (ref 21–32)
CREAT SERPL-MCNC: <0.5 MG/DL (ref 0.6–1.2)
EOSINOPHILS ABSOLUTE: 0.3 K/UL (ref 0–0.6)
EOSINOPHILS RELATIVE PERCENT: 2.3 %
GFR AFRICAN AMERICAN: >60
GFR NON-AFRICAN AMERICAN: >60
GLOBULIN: 2.4 G/DL
GLUCOSE BLD-MCNC: 125 MG/DL (ref 70–99)
HCT VFR BLD CALC: 30.5 % (ref 36–48)
HEMOGLOBIN: 9.9 G/DL (ref 12–16)
LYMPHOCYTES ABSOLUTE: 2.3 K/UL (ref 1–5.1)
LYMPHOCYTES RELATIVE PERCENT: 18 %
MAGNESIUM: 2.1 MG/DL (ref 1.8–2.4)
MCH RBC QN AUTO: 30.8 PG (ref 26–34)
MCHC RBC AUTO-ENTMCNC: 32.6 G/DL (ref 31–36)
MCV RBC AUTO: 94.5 FL (ref 80–100)
MONOCYTES ABSOLUTE: 1.5 K/UL (ref 0–1.3)
MONOCYTES RELATIVE PERCENT: 11.5 %
NEUTROPHILS ABSOLUTE: 8.5 K/UL (ref 1.7–7.7)
NEUTROPHILS RELATIVE PERCENT: 67.8 %
ORGANISM: ABNORMAL
PDW BLD-RTO: 14.3 % (ref 12.4–15.4)
PHOSPHORUS: 2.4 MG/DL (ref 2.5–4.9)
PLATELET # BLD: 170 K/UL (ref 135–450)
PMV BLD AUTO: 9.5 FL (ref 5–10.5)
POTASSIUM REFLEX MAGNESIUM: 3.4 MMOL/L (ref 3.5–5.1)
RBC # BLD: 3.23 M/UL (ref 4–5.2)
SODIUM BLD-SCNC: 137 MMOL/L (ref 136–145)
TOTAL CK: 969 U/L (ref 26–192)
TOTAL PROTEIN: 5.6 G/DL (ref 6.4–8.2)
TSH REFLEX: 2.03 UIU/ML (ref 0.27–4.2)
URINE CULTURE, ROUTINE: ABNORMAL
WBC # BLD: 12.6 K/UL (ref 4–11)

## 2020-10-13 PROCEDURE — 97161 PT EVAL LOW COMPLEX 20 MIN: CPT

## 2020-10-13 PROCEDURE — 51798 US URINE CAPACITY MEASURE: CPT

## 2020-10-13 PROCEDURE — 6370000000 HC RX 637 (ALT 250 FOR IP): Performed by: FAMILY MEDICINE

## 2020-10-13 PROCEDURE — 97530 THERAPEUTIC ACTIVITIES: CPT

## 2020-10-13 PROCEDURE — APPNB30 APP NON BILLABLE TIME 0-30 MINS: Performed by: PHYSICIAN ASSISTANT

## 2020-10-13 PROCEDURE — 6370000000 HC RX 637 (ALT 250 FOR IP): Performed by: ORTHOPAEDIC SURGERY

## 2020-10-13 PROCEDURE — 94640 AIRWAY INHALATION TREATMENT: CPT

## 2020-10-13 PROCEDURE — 2580000003 HC RX 258: Performed by: HOSPITALIST

## 2020-10-13 PROCEDURE — 6360000002 HC RX W HCPCS: Performed by: FAMILY MEDICINE

## 2020-10-13 PROCEDURE — 6370000000 HC RX 637 (ALT 250 FOR IP): Performed by: NURSE PRACTITIONER

## 2020-10-13 PROCEDURE — 2700000000 HC OXYGEN THERAPY PER DAY

## 2020-10-13 PROCEDURE — 1200000000 HC SEMI PRIVATE

## 2020-10-13 PROCEDURE — 6360000002 HC RX W HCPCS: Performed by: ORTHOPAEDIC SURGERY

## 2020-10-13 PROCEDURE — C9113 INJ PANTOPRAZOLE SODIUM, VIA: HCPCS | Performed by: ORTHOPAEDIC SURGERY

## 2020-10-13 PROCEDURE — 85025 COMPLETE CBC W/AUTO DIFF WBC: CPT

## 2020-10-13 PROCEDURE — 2580000003 HC RX 258: Performed by: ORTHOPAEDIC SURGERY

## 2020-10-13 PROCEDURE — 97110 THERAPEUTIC EXERCISES: CPT

## 2020-10-13 PROCEDURE — 84100 ASSAY OF PHOSPHORUS: CPT

## 2020-10-13 PROCEDURE — 82550 ASSAY OF CK (CPK): CPT

## 2020-10-13 PROCEDURE — 94761 N-INVAS EAR/PLS OXIMETRY MLT: CPT

## 2020-10-13 PROCEDURE — 80053 COMPREHEN METABOLIC PANEL: CPT

## 2020-10-13 PROCEDURE — 84443 ASSAY THYROID STIM HORMONE: CPT

## 2020-10-13 PROCEDURE — 83735 ASSAY OF MAGNESIUM: CPT

## 2020-10-13 PROCEDURE — 97166 OT EVAL MOD COMPLEX 45 MIN: CPT

## 2020-10-13 PROCEDURE — 36415 COLL VENOUS BLD VENIPUNCTURE: CPT

## 2020-10-13 RX ORDER — ALBUTEROL SULFATE 2.5 MG/3ML
2.5 SOLUTION RESPIRATORY (INHALATION) 2 TIMES DAILY
Status: DISCONTINUED | OUTPATIENT
Start: 2020-10-13 | End: 2020-10-14

## 2020-10-13 RX ORDER — POTASSIUM CHLORIDE 20 MEQ/1
20 TABLET, EXTENDED RELEASE ORAL 2 TIMES DAILY
Status: DISCONTINUED | OUTPATIENT
Start: 2020-10-13 | End: 2020-10-16 | Stop reason: HOSPADM

## 2020-10-13 RX ORDER — ACETAMINOPHEN 325 MG/1
650 TABLET ORAL EVERY 6 HOURS PRN
Status: DISCONTINUED | OUTPATIENT
Start: 2020-10-13 | End: 2020-10-15

## 2020-10-13 RX ADMIN — ALBUTEROL SULFATE 2.5 MG: 2.5 SOLUTION RESPIRATORY (INHALATION) at 13:18

## 2020-10-13 RX ADMIN — PROMETHAZINE HYDROCHLORIDE 12.5 MG: 25 TABLET ORAL at 10:15

## 2020-10-13 RX ADMIN — ACETAMINOPHEN 650 MG: 325 TABLET ORAL at 10:24

## 2020-10-13 RX ADMIN — PAROXETINE HYDROCHLORIDE 40 MG: 20 TABLET, FILM COATED ORAL at 10:15

## 2020-10-13 RX ADMIN — OXYCODONE 5 MG: 5 TABLET ORAL at 10:15

## 2020-10-13 RX ADMIN — DOCUSATE SODIUM 50MG AND SENNOSIDES 8.6MG 1 TABLET: 8.6; 5 TABLET, FILM COATED ORAL at 20:29

## 2020-10-13 RX ADMIN — CEFTRIAXONE SODIUM 1 G: 1 INJECTION, POWDER, FOR SOLUTION INTRAMUSCULAR; INTRAVENOUS at 10:16

## 2020-10-13 RX ADMIN — POTASSIUM CHLORIDE 20 MEQ: 20 TABLET, EXTENDED RELEASE ORAL at 20:30

## 2020-10-13 RX ADMIN — ENOXAPARIN SODIUM 40 MG: 40 INJECTION SUBCUTANEOUS at 10:16

## 2020-10-13 RX ADMIN — ACETAMINOPHEN 650 MG: 325 TABLET ORAL at 20:45

## 2020-10-13 RX ADMIN — POLYETHYLENE GLYCOL 3350 17 G: 17 POWDER, FOR SOLUTION ORAL at 20:30

## 2020-10-13 RX ADMIN — DOCUSATE SODIUM 50MG AND SENNOSIDES 8.6MG 1 TABLET: 8.6; 5 TABLET, FILM COATED ORAL at 10:15

## 2020-10-13 RX ADMIN — Medication 1 MG: at 20:29

## 2020-10-13 RX ADMIN — DIBASIC SODIUM PHOSPHATE, MONOBASIC POTASSIUM PHOSPHATE AND MONOBASIC SODIUM PHOSPHATE 1 TABLET: 852; 155; 130 TABLET ORAL at 19:16

## 2020-10-13 RX ADMIN — DIBASIC SODIUM PHOSPHATE, MONOBASIC POTASSIUM PHOSPHATE AND MONOBASIC SODIUM PHOSPHATE 1 TABLET: 852; 155; 130 TABLET ORAL at 12:26

## 2020-10-13 RX ADMIN — BUSPIRONE HYDROCHLORIDE 5 MG: 5 TABLET ORAL at 10:15

## 2020-10-13 RX ADMIN — POTASSIUM CHLORIDE 20 MEQ: 20 TABLET, EXTENDED RELEASE ORAL at 12:26

## 2020-10-13 RX ADMIN — SODIUM CHLORIDE: 9 INJECTION, SOLUTION INTRAVENOUS at 02:31

## 2020-10-13 RX ADMIN — PANTOPRAZOLE SODIUM 40 MG: 40 INJECTION, POWDER, FOR SOLUTION INTRAVENOUS at 10:15

## 2020-10-13 RX ADMIN — SODIUM CHLORIDE: 9 INJECTION, SOLUTION INTRAVENOUS at 20:58

## 2020-10-13 RX ADMIN — POLYETHYLENE GLYCOL 3350 17 G: 17 POWDER, FOR SOLUTION ORAL at 10:16

## 2020-10-13 RX ADMIN — SODIUM CHLORIDE: 9 INJECTION, SOLUTION INTRAVENOUS at 12:11

## 2020-10-13 RX ADMIN — LEVOTHYROXINE SODIUM 50 MCG: 0.05 TABLET ORAL at 10:15

## 2020-10-13 RX ADMIN — ALBUTEROL SULFATE 2.5 MG: 2.5 SOLUTION RESPIRATORY (INHALATION) at 20:58

## 2020-10-13 RX ADMIN — CEFAZOLIN SODIUM 2 G: 10 INJECTION, POWDER, FOR SOLUTION INTRAVENOUS at 02:31

## 2020-10-13 RX ADMIN — Medication 10 ML: at 10:17

## 2020-10-13 ASSESSMENT — PAIN SCALES - GENERAL: PAINLEVEL_OUTOF10: 3

## 2020-10-13 ASSESSMENT — PAIN DESCRIPTION - ORIENTATION: ORIENTATION: LEFT

## 2020-10-13 ASSESSMENT — PAIN SCALES - WONG BAKER: WONGBAKER_NUMERICALRESPONSE: 4

## 2020-10-13 ASSESSMENT — PAIN DESCRIPTION - LOCATION: LOCATION: HIP

## 2020-10-13 ASSESSMENT — PAIN DESCRIPTION - PAIN TYPE: TYPE: ACUTE PAIN;SURGICAL PAIN

## 2020-10-13 NOTE — CARE COORDINATION
CM updated by Will Odonnell with [facility] that they are able to accept pt Friday 10/16/20. Spoke to [facility] at [address] and they can accept pt tomorrow if medically stable no barriers and will accept a RAPID COVID day of DC.  CM following.-Barbara Sapp RN

## 2020-10-13 NOTE — PROGRESS NOTES
Department of Orthopedic Surgery  Physician Assistant   Progress Note    Subjective:       Systemic or Specific Complaints:No Complaints per patient, endorses left hip soreness. Does not recall hip fracture or surgery yesterday. Still notes right hip soreness as well but comfortable while sitting in the chair. No family at bedside today    Objective:     Patient Vitals for the past 24 hrs:   BP Temp Temp src Pulse Resp SpO2   10/13/20 1047 -- -- -- -- -- 90 %   10/13/20 1013 111/62 98 °F (36.7 °C) Oral 92 16 95 %   10/13/20 0233 137/63 98.5 °F (36.9 °C) Axillary 78 16 98 %   10/12/20 2338 -- -- Oral -- 16 --   10/12/20 1929 130/69 98.4 °F (36.9 °C) Oral 81 16 96 %   10/12/20 1815 -- -- -- -- -- 100 %   10/12/20 1615 -- -- -- 80 -- 93 %   10/12/20 1612 121/68 98.3 °F (36.8 °C) Oral 81 14 94 %   10/12/20 1550 -- -- -- -- -- 95 %   10/12/20 1545 -- -- -- -- -- 94 %   10/12/20 1540 -- -- -- -- -- 93 %   10/12/20 1535 -- -- -- 82 -- 96 %   10/12/20 1530 -- -- -- 82 -- (!) 88 %   10/12/20 1525 -- -- -- 81 -- 95 %   10/12/20 1520 -- -- -- 79 -- 96 %   10/12/20 1515 (!) 127/57 -- -- 82 -- 96 %   10/12/20 1510 (!) 122/102 -- -- 81 -- 91 %   10/12/20 1505 (!) 118/95 -- -- 83 -- (!) 88 %   10/12/20 1500 (!) 116/90 -- -- 85 -- 92 %   10/12/20 1455 (!) 128/59 -- -- 82 -- 93 %   10/12/20 1450 (!) 136/90 -- -- 85 -- 95 %   10/12/20 1445 (!) 142/101 -- -- 85 -- (!) 89 %   10/12/20 1440 (!) 138/56 -- -- 79 -- 94 %   10/12/20 1435 (!) 127/59 -- -- 82 -- 94 %   10/12/20 1430 (!) 143/62 -- -- 87 -- 92 %   10/12/20 1425 (!) 142/63 -- -- 87 -- 91 %   10/12/20 1420 (!) 149/65 -- -- 88 -- 93 %   10/12/20 1416 (!) 154/65 97.9 °F (36.6 °C) Temporal 90 12 91 %   10/12/20 1415 (!) 144/77 -- -- 90 -- 93 %   10/12/20 1410 (!) 154/65 -- -- 96 -- (!) 71 %   10/12/20 1142 (!) 158/72 99.6 °F (37.6 °C) Temporal 82 14 97 %       General: alert, appears stated age, cooperative and no distress   Wound: Wound clean and dry no evidence of infection. Motion: Painful range of Motion in affected extremity   DVT Exam: No evidence of DVT seen on physical exam.     Additional exam:  NVI to left LE  mepilex dressing in place  Thigh soft but swollen  Moving toes without difficulty    Data Review  CBC:   Lab Results   Component Value Date    WBC 12.6 10/13/2020    RBC 3.23 10/13/2020    HGB 9.9 10/13/2020    HCT 30.5 10/13/2020     10/13/2020       Renal:   Lab Results   Component Value Date     10/13/2020    K 3.4 10/13/2020     10/13/2020    CO2 21 10/13/2020    BUN 17 10/13/2020    CREATININE <0.5 10/13/2020    GLUCOSE 125 10/13/2020    CALCIUM 8.0 10/13/2020            Assessment:      left hip hemiarthroplasty, POD #1. Plan:      1:  Continue current plan of care per IM. Labs reviewed and stable post op. Confusion intermittently. UTI noted, IV abx continued. 2:  Continue Deep venous thrombosis prophylaxis- lovenox  3:  Continue Pain Control PRN, monitor with MS  4:  PT and OT, 50% WB on left LE, posterior hip precautions  5:  D/c planning for SNF placement most likely, DCP updated.       Terra Tracey PA-C

## 2020-10-13 NOTE — PROGRESS NOTES
Physical Therapy    Facility/Department: [place] [address] - MED SURG/ORTHO  Initial Assessment    NAME: Yesica eMndez  : 1935  MRN: [id number]    Date of Service: 10/13/2020    Discharge Recommendations:  Subacute/Skilled Nursing Facility   PT Equipment Recommendations  Equipment Needed: No(defer to SNF)        Barriers to home discharge:   [x] Steps to access home entry or bed/bath: Pt lives in [facility] apartment   [] No rail with steps to access home entry or bed/bath   [x] Reported available assist at home upon discharge limited (pt lives alone)   [] Patient or family requests DC to other than home    [] Patient reports expectation of post acute Discharge disposition to other than home   [x] Other: Pt currently requiring maxA x 2 with Alyssia-Stedy for bed>chair transfers. Assessment   Body structures, Functions, Activity limitations: Decreased functional mobility ; Decreased ADL status; Decreased ROM; Decreased strength;Decreased cognition;Decreased endurance; Increased pain;Decreased posture;Decreased high-level IADLs;Decreased safe awareness  Assessment: pt referred for PT eval/treatment during current hospital stay with dx of L hip fx, s/p L hip hemiarthroplasty with a posterior approach on 10/12/2020. pt previously required assistance for ADLs and is currently functioning below that baseline. pt currently requires significant assist for bed mobility/transfers and is functionally unable to ambulate even short distances. further acute PT indicated to increase ambulation capacity and decrease dependence. With previous need for assistance and lack of constant support, current PT recommendation for d/c is SNF. Treatment Diagnosis: Reduced functional mobility  Prognosis: Fair  Decision Making: Low Complexity  PT Education: Goals;PT Role;Plan of Care;Home Exercise Program;Precautions;Transfer Training; Adaptive Device Training;Weight-bearing Education;Equipment;General Safety; Disease Specific Education; Functional Mobility Training  Patient Education: Disease Specific Education: pt educated on current ROM/WB restrictions. pt verbalized understanding but will likely require reinforcement. REQUIRES PT FOLLOW UP: Yes  Activity Tolerance: Patient Tolerated treatment well;Patient limited by fatigue;Patient limited by pain       Patient Diagnosis(es): The primary encounter diagnosis was Syncope and collapse. Diagnoses of Hip pain, Closed fracture of neck of left femur, initial encounter [facility]), Traumatic rhabdomyolysis, initial encounter ([facility] [address] and Urinary tract infection without hematuria, site unspecified were also pertinent to this visit. has a past medical history of Thyroid disease. has a past surgical history that includes Total Thyroidectomy.     Restrictions  Restrictions/Precautions  Restrictions/Precautions: Weight Bearing, ROM Restrictions, General Precautions, Fall Risk  Lower Extremity Weight Bearing Restrictions  Left Lower Extremity Weight Bearing: Partial Weight Bearing  Partial Weight Bearing Percentage Or Pounds: 50%  Position Activity Restriction  Hip Precautions: No hip flexion > 90 degrees, No ADduction, No hip internal rotation  Other position/activity restrictions: posterior precautions     Vision/Hearing  Vision: Impaired  Vision Exceptions: Wears glasses at all times  Hearing: Exceptions to [facility] Exceptions: Hard of hearing/hearing concerns       Subjective  General  Chart Reviewed: Yes  Patient assessed for rehabilitation services?: Yes  Family / Caregiver Present: No  Referring Practitioner: Nabila Ball MD  Referral Date : 10/12/20  Diagnosis: Closed L hip fracture, s/p L hip hemiarthroplasty (posterior approach) on 10/12/20  Follows Commands: Within Functional Limits  General Comment  Comments: pt supine in bed upon arrival of Rehab staff  Subjective  Subjective: pt AOx4 and agreeable to treatment  Pain Screening  Patient Currently in Pain: Denies  Vital Signs  Patient Currently in Pain: Endurance Training, IADL Training, Pain Management, Equipment Evaluation, Education, & procurement, Safety Education & Training, Home Exercise Program  Safety Devices: All fall risk precautions in place, Left in chair, Call light within reach, Chair alarm in place, Nurse notified, Gait belt, Patient at risk for falls    AM-PAC Score  AM-PAC Inpatient Mobility Raw Score : 10 (10/13/20 1257)  AM-PAC Inpatient T-Scale Score : 32.29 (10/13/20 1257)  Mobility Inpatient CMS 0-100% Score: 76.75 (10/13/20 1257)  Mobility Inpatient CMS G-Code Modifier : CL (10/13/20 1257)          Goals  Short term goals  Time Frame for Short term goals: 4 days - 10/17/20 - unless otherwise specified  Short term goal 1: pt able to supine <> sit with Kayy  Short term goal 2: pt able to sit <> stand with Kayy  Short term goal 3: pt able to complete BILAT LE ther ex for 12-15 reps by 10/15/20  Short term goal 4: pt able to ambulate 25 ft with RW and Kayy  Patient Goals   Patient goals : \"to walk around my house (approx 48') with my daughter (supervision/sba)\"       Therapy Time   Individual Concurrent Group Co-treatment   Time In 0817         Time Out 0910         Minutes 53         Timed Code Treatment Minutes: 37 Minutes       Reena Bonilla, SPT, CSCS  Cosigned by:  Toro Rodriguez, PT     If pt is unable to be seen after this session, please let this note serve as discharge summary. Please see case management note for discharge disposition. Thank you.

## 2020-10-13 NOTE — PROGRESS NOTES
Occupational Therapy   Occupational Therapy Initial Assessment/Treatment       Date: 10/13/2020   Patient Name: Gumaro Gonzalez  MRN: [id number]     : 1935    Date of Service: 10/13/2020    Discharge Recommendations:  [address]  OT Equipment Recommendations  Other: CTA pending progress    Assessment   Performance deficits / Impairments: Decreased functional mobility ; Decreased endurance;Decreased ADL status; Decreased balance;Decreased strength;Decreased cognition;Decreased high-level IADLs;Decreased posture  Assessment: Patient is a 81yr old female admitted for a fall. CT of pelvis showed comminuted left femoral neck fracture. Patient is currently 50% WB to LLE with posterior hip precautions. Patient slightly confused and unable to recall she had broken her hip. Patient reports PLOF as independent with ADLs. Patient required assistance x2 for mobility and transfers. Ongoing OT is recommended in order to increase functional status and ensure return to baseline function. Prognosis: Good  Decision Making: Medium Complexity  Assistance / Modification: x2  OT Education: OT Role;Plan of Care;Orientation;Precautions; ADL Adaptive Strategies;Transfer Training;Energy Conservation  Patient Education: disease specific education: posterior hip precautions, wb status, role of OT  Barriers to Learning: cognition  REQUIRES OT FOLLOW UP: Yes  Activity Tolerance  Activity Tolerance: Patient limited by fatigue;Patient limited by pain;Treatment limited secondary to decreased cognition  Safety Devices  Safety Devices in place: Yes  Type of devices: All fall risk precautions in place; Left in chair;Call light within reach;Nurse notified; Patient at risk for falls; Chair alarm in place           Patient Diagnosis(es): The primary encounter diagnosis was Syncope and collapse.  Diagnoses of Hip pain, Closed fracture of neck of left femur, initial encounter [facility]), Traumatic rhabdomyolysis, initial encounter ([address] and Urinary tract infection without hematuria, site unspecified were also pertinent to this visit. has a past medical history of Thyroid disease. has a past surgical history that includes Total Thyroidectomy. Restrictions  Restrictions/Precautions  Restrictions/Precautions: Weight Bearing, ROM Restrictions, General Precautions, Fall Risk  Lower Extremity Weight Bearing Restrictions  Left Lower Extremity Weight Bearing: Partial Weight Bearing  Partial Weight Bearing Percentage Or Pounds: 50%  Position Activity Restriction  Hip Precautions: No hip flexion > 90 degrees, No ADduction, No hip internal rotation, Posterior hip precautions  Other position/activity restrictions: posterior precautions    Subjective   General  Chart Reviewed: Yes, Orders, Progress Notes, History and Physical, Imaging, Operative Notes  Patient assessed for rehabilitation services?: Yes  Family / Caregiver Present: No  Referring Practitioner: Darinel Solorzano MD  Diagnosis: Fall s/p L peg-arthroplasty  Subjective  Subjective: Patient pleasant and agreeable to OT. Patient unable to recall what hip she fractured.   Patient Currently in Pain: (denies of pain at rest; moaning with movements of L hip; declined any interventions)  Vital Signs  Patient Currently in Pain: (denies of pain at rest; moaning with movements of L hip; declined any interventions)  Oxygen Therapy  O2 Device: Nasal cannula  O2 Flow Rate (L/min): 2 L/min  Social/Functional History  Social/Functional History  Lives With: Alone  Type of Home: Apartment  Home Layout: Two level  Home Access: Level entry  Bathroom Shower/Tub: Tub/Shower unit  Bathroom Toilet: Handicap height  Bathroom Equipment: (none)  Home Equipment: 4 wheeled walker, [address] Help From: Personal care attendant(\"3 workers\")  ADL Assistance: Needs assistance  Homemaking Assistance: Needs assistance  Ambulation Assistance: Independent(4WW, pt states they fall alot)  Transfer Assistance: Independent  Occupation: Retired  Type of occupation: \"works with tobacco\"  Leisure & Hobbies: watch tv, and hang out with grandkids. Objective        Orientation  Overall Orientation Status: Impaired  Orientation Level: Disoriented to time;Oriented to place; Disoriented to situation(unable to state the correct bday; but able to state first and last name)  Observation/Palpation  Posture: Fair  Balance  Sitting Balance: Moderate assistance(Mod A to CGA; Patient sitting EOB for up to 13 mins;)  Standing Balance: Dependent/Total(min x2 static standing in [place])  Standing Balance  Time: up to 20 seconds  Activity: static standing up to RW; Standing in [place]  Comment: decreased movement and weightshifting to progress LLE  Functional Mobility  Functional - Mobility Device: Other(RadhaAB Tasty)  Functional Mobility Comments: Bed to chair with radha stedy; pt unable to progress LLE in RW during 1st attempt  ADL  Toileting: Dependent/Total(garcia catheter)  Tone RUE  RUE Tone: Normotonic  Tone LUE  LUE Tone: Normotonic  Coordination  Movements Are Fluid And Coordinated: Yes     Bed mobility  Supine to Sit: Maximum assistance;2 Person assistance  Sit to Supine: Unable to assess  Scootin Person assistance;Maximal assistance  Transfers  Sit to stand: Maximum assistance;Dependent/Total  Stand to sit: Maximum assistance;Dependent/Total  Transfer Comments: x1 up to RW; Additional time up to Sarah Freeze     Cognition  Overall Cognitive Status: Exceptions  Arousal/Alertness: Delayed responses to stimuli  Following Commands: Follows one step commands with repetition; Follows one step commands with increased time  Attention Span: Appears intact  Memory: Decreased recall of recent events  Safety Judgement: Decreased awareness of need for assistance  Problem Solving: Assistance required to generate solutions;Assistance required to correct errors made;Assistance required to implement solutions  Insights: Decreased awareness of deficits  Initiation: Requires cues for some  Sequencing: Does not require cues                                        Plan   Plan  Times per week: 4-5x  Times per day: Daily  Current Treatment Recommendations: Patient/Caregiver Education & Training, Balance Training, Functional Mobility Training, Endurance Training, Safety Education & Training, Self-Care / ADL, Pain Management, Positioning, Strengthening, Home Management Training      AM-PAC Score        AM-PAC Inpatient Daily Activity Raw Score: 12 (10/13/20 [facility])  AM-PAC Inpatient ADL T-Scale Score : 30.6 (10/13/20 [facility])  ADL Inpatient CMS 0-100% Score: 66.57 (10/13/20 [facility])  ADL Inpatient CMS G-Code Modifier : CL (10/13/20 [facility])    Goals  Short term goals  Time Frame for Short term goals: by 1 week (10/20/20)  Short term goal 1: Complete toilet transfers with mod A x1  Short term goal 2: Tolerate x3 mins of functional standing in order to increase independence for LB ADLs  Short term goal 3: Complete Lb dressing with max with use of AE as needed  Short term goal 4: Pt will verbalize understanding of posterior hip precautions in order to increase safety and independence with ADLs       Therapy Time   Individual Concurrent Group Co-treatment   Time In 0817         Time Out 0910         Minutes 53         Timed Code Treatment Minutes: [address]/MELIDA

## 2020-10-13 NOTE — CARE COORDINATION
CASE MANAGEMENT INITIAL ASSESSMENT      Reviewed chart and completed assessment with: Pt's son  Explained Case Management role/services. Primary contact information: Son Ramana Ahumada, [phone]. Health Care Decision Maker:  Who do you trust or have selected to make healthcare decisions for you? Name:   Elizabeth Carrasquillo, [phone]. Can this person be reached and be able to respond quickly, such as within a few minutes or hours? Yes  Who would be your back-up decision maker? Name  Marry Vizcarra, Daughter. Phone Number: [phone]    Admit date/status: IP, 10/12/20  Diagnosis: Closed displace fracture. Is this a Readmission?:  No      Insurance: Medicare   Precert required for SNF: No       3 night stay required: No    Living arrangements, Adls, care needs, prior to admission: lives in a first floor apartment alone no SE. Has family close by to help. Mostly independent in all ADL's. Family drives. Transportation:Squad     Durable Medical Equipment at home:  Walker_X_Cane__RTS__ BSC__Shower Chair_X_  02__ HHN__ CPAP__  BiPap__  Hospital Bed__ W/C___ Other__________    Services in the home and/or outpatient, prior to admission: Pt is active with [facility] [employer] [facility]. Spoke to DDN CM. Pt is active with Non-Skilled HC thru [facility]. Has aide 3X's a week to help with cleaning and run errands. Meals on Wheels. PT/OT recs: None seen at this time. Hospital Exemption Notification (HEN): Needed for SNF, not initiated. Barriers to discharge: None    Plan/comments: CM spoke to pt's son Eva Swanson on the phone for initial assessment and to discuss likely valencia of SNF placement. Son Eva Swanson would like referrals to 1.) [facility] 2.) [facility]. Referrals faxed. Son concerned that mom may need more assistance at home or AL/IL. Referral placed to [facility], spoke to Bernice.  CM following-Barbara Sapp, RN      Spoke to [facility] with [facility] and there are no beds

## 2020-10-14 ENCOUNTER — APPOINTMENT (OUTPATIENT)
Dept: GENERAL RADIOLOGY | Age: 85
DRG: 956 | End: 2020-10-14
Payer: MEDICARE

## 2020-10-14 LAB
A/G RATIO: 1.3 (ref 1.1–2.2)
ALBUMIN SERPL-MCNC: 3 G/DL (ref 3.4–5)
ALP BLD-CCNC: 116 U/L (ref 40–129)
ALT SERPL-CCNC: 44 U/L (ref 10–40)
ANION GAP SERPL CALCULATED.3IONS-SCNC: 7 MMOL/L (ref 3–16)
AST SERPL-CCNC: 58 U/L (ref 15–37)
ATYPICAL LYMPHOCYTE RELATIVE PERCENT: 1 % (ref 0–6)
BANDED NEUTROPHILS RELATIVE PERCENT: 4 % (ref 0–7)
BASOPHILS ABSOLUTE: 0.1 K/UL (ref 0–0.2)
BASOPHILS RELATIVE PERCENT: 1 %
BILIRUB SERPL-MCNC: 0.8 MG/DL (ref 0–1)
BUN BLDV-MCNC: 15 MG/DL (ref 7–20)
CALCIUM SERPL-MCNC: 8 MG/DL (ref 8.3–10.6)
CHLORIDE BLD-SCNC: 105 MMOL/L (ref 99–110)
CO2: 24 MMOL/L (ref 21–32)
CREAT SERPL-MCNC: <0.5 MG/DL (ref 0.6–1.2)
EOSINOPHILS ABSOLUTE: 0.8 K/UL (ref 0–0.6)
EOSINOPHILS RELATIVE PERCENT: 6 %
GFR AFRICAN AMERICAN: >60
GFR NON-AFRICAN AMERICAN: >60
GLOBULIN: 2.4 G/DL
GLUCOSE BLD-MCNC: 132 MG/DL (ref 70–99)
HCT VFR BLD CALC: 27.9 % (ref 36–48)
HEMATOLOGY PATH CONSULT: NORMAL
HEMATOLOGY PATH CONSULT: YES
HEMOGLOBIN: 9.2 G/DL (ref 12–16)
LYMPHOCYTES ABSOLUTE: 3.9 K/UL (ref 1–5.1)
LYMPHOCYTES RELATIVE PERCENT: 29 %
MACROCYTES: ABNORMAL
MCH RBC QN AUTO: 31.1 PG (ref 26–34)
MCHC RBC AUTO-ENTMCNC: 33 G/DL (ref 31–36)
MCV RBC AUTO: 94.4 FL (ref 80–100)
MONOCYTES ABSOLUTE: 1.6 K/UL (ref 0–1.3)
MONOCYTES RELATIVE PERCENT: 12 %
NEUTROPHILS ABSOLUTE: 6.7 K/UL (ref 1.7–7.7)
NEUTROPHILS RELATIVE PERCENT: 47 %
PDW BLD-RTO: 14.2 % (ref 12.4–15.4)
PHOSPHORUS: 1.8 MG/DL (ref 2.5–4.9)
PLATELET # BLD: 166 K/UL (ref 135–450)
PMV BLD AUTO: 9.8 FL (ref 5–10.5)
POLYCHROMASIA: ABNORMAL
POTASSIUM REFLEX MAGNESIUM: 4.2 MMOL/L (ref 3.5–5.1)
PROCALCITONIN: 0.23 NG/ML (ref 0–0.15)
RBC # BLD: 2.95 M/UL (ref 4–5.2)
SODIUM BLD-SCNC: 136 MMOL/L (ref 136–145)
TOTAL CK: 641 U/L (ref 26–192)
TOTAL PROTEIN: 5.4 G/DL (ref 6.4–8.2)
VITAMIN D 25-HYDROXY: 30.6 NG/ML
WBC # BLD: 13.1 K/UL (ref 4–11)

## 2020-10-14 PROCEDURE — 36415 COLL VENOUS BLD VENIPUNCTURE: CPT

## 2020-10-14 PROCEDURE — 71045 X-RAY EXAM CHEST 1 VIEW: CPT

## 2020-10-14 PROCEDURE — 6360000002 HC RX W HCPCS: Performed by: FAMILY MEDICINE

## 2020-10-14 PROCEDURE — 80053 COMPREHEN METABOLIC PANEL: CPT

## 2020-10-14 PROCEDURE — 82306 VITAMIN D 25 HYDROXY: CPT

## 2020-10-14 PROCEDURE — 97530 THERAPEUTIC ACTIVITIES: CPT

## 2020-10-14 PROCEDURE — 6360000002 HC RX W HCPCS: Performed by: ORTHOPAEDIC SURGERY

## 2020-10-14 PROCEDURE — 2700000000 HC OXYGEN THERAPY PER DAY

## 2020-10-14 PROCEDURE — 2580000003 HC RX 258: Performed by: ORTHOPAEDIC SURGERY

## 2020-10-14 PROCEDURE — 84145 PROCALCITONIN (PCT): CPT

## 2020-10-14 PROCEDURE — 94640 AIRWAY INHALATION TREATMENT: CPT

## 2020-10-14 PROCEDURE — 6370000000 HC RX 637 (ALT 250 FOR IP): Performed by: FAMILY MEDICINE

## 2020-10-14 PROCEDURE — 85025 COMPLETE CBC W/AUTO DIFF WBC: CPT

## 2020-10-14 PROCEDURE — 6370000000 HC RX 637 (ALT 250 FOR IP): Performed by: ORTHOPAEDIC SURGERY

## 2020-10-14 PROCEDURE — 94669 MECHANICAL CHEST WALL OSCILL: CPT

## 2020-10-14 PROCEDURE — 1200000000 HC SEMI PRIVATE

## 2020-10-14 PROCEDURE — 97110 THERAPEUTIC EXERCISES: CPT

## 2020-10-14 PROCEDURE — 6370000000 HC RX 637 (ALT 250 FOR IP): Performed by: NURSE PRACTITIONER

## 2020-10-14 PROCEDURE — 94761 N-INVAS EAR/PLS OXIMETRY MLT: CPT

## 2020-10-14 PROCEDURE — 84100 ASSAY OF PHOSPHORUS: CPT

## 2020-10-14 PROCEDURE — 97535 SELF CARE MNGMENT TRAINING: CPT

## 2020-10-14 PROCEDURE — 2580000003 HC RX 258: Performed by: FAMILY MEDICINE

## 2020-10-14 PROCEDURE — 2500000003 HC RX 250 WO HCPCS: Performed by: FAMILY MEDICINE

## 2020-10-14 PROCEDURE — C9113 INJ PANTOPRAZOLE SODIUM, VIA: HCPCS | Performed by: ORTHOPAEDIC SURGERY

## 2020-10-14 PROCEDURE — APPNB30 APP NON BILLABLE TIME 0-30 MINS: Performed by: PHYSICIAN ASSISTANT

## 2020-10-14 PROCEDURE — 82550 ASSAY OF CK (CPK): CPT

## 2020-10-14 RX ORDER — ACETYLCYSTEINE 200 MG/ML
3 SOLUTION ORAL; RESPIRATORY (INHALATION) 2 TIMES DAILY
Status: DISCONTINUED | OUTPATIENT
Start: 2020-10-14 | End: 2020-10-15

## 2020-10-14 RX ORDER — IPRATROPIUM BROMIDE AND ALBUTEROL SULFATE 2.5; .5 MG/3ML; MG/3ML
1 SOLUTION RESPIRATORY (INHALATION) 4 TIMES DAILY
Status: DISCONTINUED | OUTPATIENT
Start: 2020-10-14 | End: 2020-10-16 | Stop reason: HOSPADM

## 2020-10-14 RX ORDER — SENNA AND DOCUSATE SODIUM 50; 8.6 MG/1; MG/1
2 TABLET, FILM COATED ORAL 2 TIMES DAILY
Status: DISCONTINUED | OUTPATIENT
Start: 2020-10-14 | End: 2020-10-16 | Stop reason: HOSPADM

## 2020-10-14 RX ORDER — OXYCODONE HCL 10 MG/1
10 TABLET, FILM COATED, EXTENDED RELEASE ORAL DAILY
Status: DISCONTINUED | OUTPATIENT
Start: 2020-10-14 | End: 2020-10-16 | Stop reason: HOSPADM

## 2020-10-14 RX ORDER — OXYCODONE HYDROCHLORIDE 5 MG/1
5 TABLET ORAL EVERY 4 HOURS PRN
Qty: 10 TABLET | Refills: 0 | Status: SHIPPED | OUTPATIENT
Start: 2020-10-14 | End: 2020-10-17

## 2020-10-14 RX ADMIN — POLYETHYLENE GLYCOL 3350 17 G: 17 POWDER, FOR SOLUTION ORAL at 08:37

## 2020-10-14 RX ADMIN — CEFTRIAXONE SODIUM 1 G: 1 INJECTION, POWDER, FOR SOLUTION INTRAMUSCULAR; INTRAVENOUS at 08:37

## 2020-10-14 RX ADMIN — DIBASIC SODIUM PHOSPHATE, MONOBASIC POTASSIUM PHOSPHATE AND MONOBASIC SODIUM PHOSPHATE 2 TABLET: 852; 155; 130 TABLET ORAL at 12:27

## 2020-10-14 RX ADMIN — DOCUSATE SODIUM 50MG AND SENNOSIDES 8.6MG 1 TABLET: 8.6; 5 TABLET, FILM COATED ORAL at 08:41

## 2020-10-14 RX ADMIN — DOXYCYCLINE 100 MG: 100 INJECTION, POWDER, LYOPHILIZED, FOR SOLUTION INTRAVENOUS at 10:50

## 2020-10-14 RX ADMIN — DOXYCYCLINE 100 MG: 100 INJECTION, POWDER, LYOPHILIZED, FOR SOLUTION INTRAVENOUS at 20:56

## 2020-10-14 RX ADMIN — LEVOTHYROXINE SODIUM 50 MCG: 0.05 TABLET ORAL at 08:40

## 2020-10-14 RX ADMIN — POTASSIUM CHLORIDE 20 MEQ: 20 TABLET, EXTENDED RELEASE ORAL at 08:39

## 2020-10-14 RX ADMIN — IPRATROPIUM BROMIDE AND ALBUTEROL SULFATE 1 AMPULE: .5; 3 SOLUTION RESPIRATORY (INHALATION) at 19:36

## 2020-10-14 RX ADMIN — Medication 1 MG: at 18:25

## 2020-10-14 RX ADMIN — DIBASIC SODIUM PHOSPHATE, MONOBASIC POTASSIUM PHOSPHATE AND MONOBASIC SODIUM PHOSPHATE 2 TABLET: 852; 155; 130 TABLET ORAL at 08:39

## 2020-10-14 RX ADMIN — POTASSIUM CHLORIDE 20 MEQ: 20 TABLET, EXTENDED RELEASE ORAL at 20:57

## 2020-10-14 RX ADMIN — ACETAMINOPHEN 650 MG: 325 TABLET ORAL at 23:34

## 2020-10-14 RX ADMIN — ENOXAPARIN SODIUM 40 MG: 40 INJECTION SUBCUTANEOUS at 08:38

## 2020-10-14 RX ADMIN — SODIUM CHLORIDE: 9 INJECTION, SOLUTION INTRAVENOUS at 20:56

## 2020-10-14 RX ADMIN — Medication 10 ML: at 08:46

## 2020-10-14 RX ADMIN — ALBUTEROL SULFATE 2.5 MG: 2.5 SOLUTION RESPIRATORY (INHALATION) at 09:15

## 2020-10-14 RX ADMIN — PAROXETINE HYDROCHLORIDE 40 MG: 20 TABLET, FILM COATED ORAL at 08:39

## 2020-10-14 RX ADMIN — OXYCODONE HYDROCHLORIDE 10 MG: 10 TABLET, FILM COATED, EXTENDED RELEASE ORAL at 10:51

## 2020-10-14 RX ADMIN — DIBASIC SODIUM PHOSPHATE, MONOBASIC POTASSIUM PHOSPHATE AND MONOBASIC SODIUM PHOSPHATE 2 TABLET: 852; 155; 130 TABLET ORAL at 18:25

## 2020-10-14 RX ADMIN — ACETYLCYSTEINE 600 MG: 200 SOLUTION ORAL; RESPIRATORY (INHALATION) at 19:37

## 2020-10-14 RX ADMIN — PANTOPRAZOLE SODIUM 40 MG: 40 INJECTION, POWDER, FOR SOLUTION INTRAVENOUS at 08:44

## 2020-10-14 RX ADMIN — POLYETHYLENE GLYCOL 3350 17 G: 17 POWDER, FOR SOLUTION ORAL at 20:56

## 2020-10-14 RX ADMIN — DOCUSATE SODIUM 50 MG AND SENNOSIDES 8.6 MG 2 TABLET: 8.6; 5 TABLET, FILM COATED ORAL at 20:57

## 2020-10-14 RX ADMIN — BUSPIRONE HYDROCHLORIDE 5 MG: 5 TABLET ORAL at 08:41

## 2020-10-14 RX ADMIN — DIBASIC SODIUM PHOSPHATE, MONOBASIC POTASSIUM PHOSPHATE AND MONOBASIC SODIUM PHOSPHATE 2 TABLET: 852; 155; 130 TABLET ORAL at 20:56

## 2020-10-14 ASSESSMENT — PAIN SCALES - WONG BAKER: WONGBAKER_NUMERICALRESPONSE: 6

## 2020-10-14 ASSESSMENT — PAIN SCALES - GENERAL
PAINLEVEL_OUTOF10: 7
PAINLEVEL_OUTOF10: 10

## 2020-10-14 ASSESSMENT — PAIN DESCRIPTION - LOCATION: LOCATION: HIP;GROIN

## 2020-10-14 ASSESSMENT — PAIN DESCRIPTION - PAIN TYPE: TYPE: SURGICAL PAIN

## 2020-10-14 ASSESSMENT — PAIN DESCRIPTION - ORIENTATION: ORIENTATION: LEFT

## 2020-10-14 NOTE — PROGRESS NOTES
Physical Therapy  Facility/Department: [facility] C5 - MED SURG/ORTHO  Daily Treatment Note  NAME: Aleks Ewing  : 1935  MRN: [id number]    Date of Service: 10/14/2020    Discharge Recommendations:  Subacute/Skilled Nursing Facility   PT Equipment Recommendations  Equipment Needed: No(defer to SNF)    Assessment   Body structures, Functions, Activity limitations: Decreased functional mobility ; Decreased ADL status; Decreased ROM; Decreased strength;Decreased cognition;Decreased endurance; Increased pain;Decreased posture;Decreased high-level IADLs;Decreased safe awareness  Assessment: pt followed up on during current hospital stay s/p L hip hemiarthroplasty with a posterior approach 10/12/2020. pt previously required assistance for ADLs and is currently functioning below that baseline. pt currently requires significant assist for bed mobility/transfers and is functionally unable to ambulate even short distances. pt seemed to regress in strength and cognitive status significantly since the previous therapy session. further acute PT indicated to increase ambulation capacity and decrease dependence. With previous need for assistance and lack of constant support at home; Current PT recommendation for d/c is SNF. Treatment Diagnosis: Reduced functional mobility  Prognosis: Fair  Decision Making: Low Complexity  PT Education: Goals;PT Role;Plan of Care;Home Exercise Program;Precautions;Transfer Training; Adaptive Device Training;Weight-bearing Education;Equipment;General Safety; Disease Specific Education; Functional Mobility Training  Patient Education: Disease Specific Education: pt educated on current ROM/WB restrictions. pt verbalized understanding but will require reinforcement. REQUIRES PT FOLLOW UP: Yes  Activity Tolerance: Patient Tolerated treatment well;Patient limited by fatigue;Patient limited by pain; Patient limited by cognitive status     Patient Diagnosis(es): The primary encounter diagnosis was Syncope and collapse. Diagnoses of Hip pain, Closed fracture of neck of left femur, initial encounter [facility]), Traumatic rhabdomyolysis, initial encounter ([facility] [address] and Urinary tract infection without hematuria, site unspecified were also pertinent to this visit. has a past medical history of Thyroid disease. has a past surgical history that includes Total Thyroidectomy and hip surgery (Left, 10/12/2020). Restrictions  Restrictions/Precautions  Restrictions/Precautions: Weight Bearing, ROM Restrictions, General Precautions, Fall Risk  Lower Extremity Weight Bearing Restrictions  Left Lower Extremity Weight Bearing: Partial Weight Bearing  Partial Weight Bearing Percentage Or Pounds: 50%  Position Activity Restriction  Hip Precautions: No hip flexion > 90 degrees, No ADduction, No hip internal rotation, Posterior hip precautions  Other position/activity restrictions: posterior precautions     Subjective   General  Chart Reviewed: Yes  Family / Caregiver Present: No  Referring Practitioner: Ko Ferguson MD  Subjective  Subjective: pt seemed confused and had delayed responsiveness during session. General Comment  Comments: pt supine in bed upon arrival of Rehab staff  Pain Screening  Patient Currently in Pain: Yes  Pain Assessment  Pain Assessment: 0-10  Pain Level: 7  Pain Location: Hip  Pain Orientation: Left  Non-Pharmacologic Pain Interventions: Repositioned; Ambulation/increased activity; Emotional support    Orientation  Orientation  Overall Orientation Status: Within Functional Limits     Cognition   Cognition  Overall Cognitive Status: Exceptions  Arousal/Alertness: Delayed responses to stimuli  Following Commands: Follows one step commands with repetition; Follows one step commands with increased time  Attention Span: Appears intact  Memory: Decreased recall of precautions  Safety Judgement: Decreased awareness of need for assistance  Problem Solving: Assistance required to generate solutions;Assistance

## 2020-10-14 NOTE — PROGRESS NOTES
Department of Orthopedic Surgery  Physician Assistant   Progress Note    Subjective:       Systemic or Specific Complaints:No Complaints per patient this am.  Awaiting therapy. No family at bedside    Objective:     Patient Vitals for the past 24 hrs:   BP Temp Temp src Pulse Resp SpO2   10/14/20 0916 -- -- -- -- 16 93 %   10/14/20 0834 135/70 98.2 °F (36.8 °C) Oral 97 18 94 %   10/14/20 0432 132/76 99.3 °F (37.4 °C) Oral 95 18 92 %   10/13/20 2320 132/66 98.5 °F (36.9 °C) Oral 93 18 94 %   10/13/20 2059 -- -- -- -- -- 91 %   10/13/20 2025 127/65 100.6 °F (38.1 °C) Oral 101 16 94 %       General: alert, appears stated age, cooperative and no distress   Wound: Wound clean and dry no evidence of infection. Motion: Painful range of Motion in affected extremity   DVT Exam: No evidence of DVT seen on physical exam.     Additional exam:  NVI to left LE  mepilex dressing in place  Thigh soft but swollen  Moving toes without difficulty    Data Review  CBC:   Lab Results   Component Value Date    WBC 13.1 10/14/2020    RBC 2.95 10/14/2020    HGB 9.2 10/14/2020    HCT 27.9 10/14/2020     10/14/2020       Renal:   Lab Results   Component Value Date     10/14/2020    K 4.2 10/14/2020     10/14/2020    CO2 24 10/14/2020    BUN 15 10/14/2020    CREATININE <0.5 10/14/2020    GLUCOSE 132 10/14/2020    CALCIUM 8.0 10/14/2020            Assessment:      left hip hemiarthroplasty, POD #2. Plan:      1:  Continue current plan of care per IM. Labs reviewed and stable post op. Confusion intermittently. UTI noted, IV abx continued. 2:  Continue Deep venous thrombosis prophylaxis- lovenox  3:  Continue Pain Control PRN, monitor with MS  4:  PT and OT, 50% WB on left LE, posterior hip precautions  5:  D/c planning for SNF placement most likely, DCP updated. VGT vs OVM  6:  Ice to hip. Instructions placed for follow up.       Aguilar Henley PA-C

## 2020-10-14 NOTE — PROGRESS NOTES
Inpatient Family Medicine Progress Note      PCP: Hafsa Peña    Chief Complaint:  Fall w/ Left Femoral Neck Fracture s/p hemiarthoplasty     Hospital Course:      81 y. o. female with PMH significant for Dementia, Depression and Hypothyroidism, who [facility] to [facility] with a fall which believes occurred around noon.  Pt states she had a BM on the toilet and then woke up in the floor. Kenneth Soriano is unsure if she hit her head, but noted that her right hip was hurting and she could not get up.  EMS found her awake lying in the bathroom floor and brought her to ED.     In the ED:  CT of pelvis showed comminuted left femoral neck fracture  CT head showed no intracranial abnormality  CT neck no acute cervical spine abnormality  CXR showed no acute posttraumatic abnormality     EKG: NSR, 88 bpm, Prolonged QT, Nonspecific T wave abnormality, no prior EKG for comparison     UA: +Nitrites, +Leukocyte esterase, +Blood, +Protein, WBC , RBC 11-20, bacteria 4+     WBC: 16.4  Total CK: 2872  BUN: 32  Cr: 0.5  Troponin: < 0.01     Rec'd Dose of ceftriaxone, 1000 ml of NS     On the floor, ceftriaxone and fluids continued.      Surgery on 10/12/2020  LEFT HIP HEMIARTHROPLASTY FOR FEMORAL NECK FRACTURE      Surgery went well and patient had no complaints following surgery.       Subjective:     POD #2:   Pts temp and HR increased overnight  Repeat CXR showed new bibasilar atelectasis vs. Pneumonia     Denies SOB, denies Incentive Spirometry  States 10/10 pain  Denies Nausea  Eating okay   BMs- Not yet       Medications:  Reviewed    Infusion Medications    sodium chloride 50 mL/hr at 10/13/20 2058     Scheduled Medications    phosphorus  500 mg Oral 4x Daily    doxycycline (VIBRAMYCIN) IV  100 mg Intravenous Q12H    [START ON 10/15/2020] cefTRIAXone (ROCEPHIN) IV  1 g Intravenous Q24H    potassium chloride  20 mEq Oral BID    albuterol  2.5 mg Nebulization BID    busPIRone  5 mg Oral Daily    levothyroxine  50 mcg Oral Daily    PARoxetine  40 mg Oral Daily    pantoprazole  40 mg Intravenous Daily    polyethylene glycol  17 g Oral BID    melatonin ER  1 mg Oral QPM    sodium chloride flush  10 mL Intravenous 2 times per day    sennosides-docusate sodium  1 tablet Oral BID    enoxaparin  40 mg Subcutaneous Daily    lidocaine  1 patch Transdermal Daily     PRN Meds: acetaminophen, polyethylene glycol, promethazine **OR** [DISCONTINUED] ondansetron, morphine **OR** morphine, bisacodyl, sodium chloride flush, magnesium hydroxide, oxyCODONE **OR** oxyCODONE      Intake/Output Summary (Last 24 hours) at 10/14/2020 0925  Last data filed at 10/13/2020 1716  Gross per 24 hour   Intake --   Output 520 ml   Net -520 ml       Physical Exam Performed:    /70   Pulse 97   Temp 98.2 °F (36.8 °C) (Oral)   Resp 16   Ht 5' 7\" (1.702 m)   Wt 159 lb (72.1 kg)   SpO2 93%   BMI 24.90 kg/m²     General appearance: Lying still in bed, ate some breakfast, difficulty moving due to pain       HEENT: Pupils equal, round, and reactive to light. Conjunctivae/corneas clear. Neck: Supple, with full range of motion. Trachea midline. Respiratory:  Normal respiratory effort. Discrete wheezes bilaterally, crackles in left lower lobe    Cardiovascular: Regular rate and rhythm with normal S1/S2     Abdomen: Soft, non-tender, non-distended with normal bowel sounds. Musculoskeletal: No clubbing, cyanosis or edema bilaterally. Full range of motion without deformity. Skin: Skin color, texture, turgor normal.  No rashes or lesions. Wound bandaged, no surrounding skin erythema, or foul smelling drainage from bandage  Neurologic:  Neurovascularly intact without any focal sensory/motor deficits.    Psychiatric: Alert and oriented, thought content appropriate, normal insight      Labs:   Recent Labs     10/11/20  1935/20  0829 10/14/20  0641   WBC 16.4* 12.6* 13.1*   HGB 12.5 9.9* 9.2*   HCT 37.1 30.5* 27.9*    Adali Sylvester     10/11/20  1935/20  0829 10/14/20  0641    137 136   K 4.0 3.4* 4.2    107 105   CO2 22 21 24   BUN 32* 17 15   CREATININE <0.5* <0.5* <0.5*   CALCIUM 9.1 8.0* 8.0*   PHOS 2.4* 2.4* 1.8*     Recent Labs     10/11/20  1935/20  0829 10/14/20  0641   AST 79* 70* 58*   ALT 36 50* 44*   BILITOT 0.8 0.7 0.8   ALKPHOS 87 94 116     Recent Labs     10/11/20  1936   INR 1.16*     Recent Labs     10/11/20  1935/20  0829 10/14/20  0641   CKTOTAL 2,872* 969* 641*   TROPONINI <0.01  --   --        Urinalysis:      Lab Results   Component Value Date    NITRU POSITIVE 10/11/2020    WBCUA  10/11/2020    BACTERIA 4+ 10/11/2020    RBCUA 11-20 10/11/2020    BLOODU MODERATE 10/11/2020    SPECGRAV >=1.030 10/11/2020    GLUCOSEU Negative 10/11/2020       Radiology:  XR CHEST PORTABLE   Final Result   Increased bibasilar airspace disease, either atelectasis or pneumonia. XR PELVIS (1-2 VIEWS)   Final Result   No unexpected findings following left hip arthroplasty. XR HIP LEFT (1 VIEW)   Final Result   No acute osseous findings of the pelvis or left hip following partial left   hip arthroplasty. CT PELVIS WO CONTRAST Additional Contrast? None   Final Result   Fracture of the left femoral neck as described above. Mild degenerative change about the left hip. CT Head WO Contrast   Final Result   No acute intracranial abnormality visualized. CT Cervical Spine WO Contrast   Final Result   1. No acute abnormality of the cervical spine. 2. Multilevel degenerative change of the cervical spine. 3. Partially imaged thyroid is enlarged and heterogeneous. Recommend   nonemergent dedicated follow-up thyroid ultrasound as per below. RECOMMENDATIONS:   Managing Incidental Thyroid Nodule Detected at CT or MRI or US1.       Further evaluation by thyroid Ultrasound recommended for these incidental   nodules:      ~Age 18 years or less - Any hip hemiarthroplasty  - Okay for 50% weightbearing left lower extremity with posterior hip precautions  - Pain control, postop antibiotics  - Bowel Regimen  - PT/OT  - Rehab available when medically stable    > Oxy 5 BID, Tylenol stopped due to elevated LFTs  > Promethazine 30 mins before meals for nausea  - Vitamin D level ordered  > Likely start Vit D and Bisphosphanate  > Low protein, add supplementation to meals     Pneumonia   - Overnight tachycardia, low grade fever, increase WBC   - CXR shows changes consistent with atelectasis vs. Pneumonia  - Crackles in LLL on physical exam   - Continue Albuterol and Incentive Spirometry  - Start Doxycycline BID  - Start Mucomyst and Acapella  - Fluids 125 ml/ hr    Fall 2/2 to vasovagal vs UTI   - Low risk for arrhythmia/ cardiac cause  - - Sl Increased Qtc, Troponin <0.01  - - TSH wnl  -CT head showed no intracranial abnormality  -CT neck no acute cervical spine abnormality  -Monitor tele     UTI  -UA: +Nitrites, +Leukocyte esterase, +Blood, +Protein, WBC , RBC 11-20, bacteria 4+  -Continue Ceftriaxone, E. Coli coverage, sensitive   -Continue NS    Rhabdomyolysis with elevated CK 2/2 to fall  -Total CK: 641, trending down  -BUN: 15  -Cr: 0.5  -Monitor CMP  -Phosphorus low, increase 500 mg QID  -Continue IVF     Prolonged QT Interval  -EKG: NSR, 88 bpm, Prolonged QT, Nonspecific T wave abnormality, no prior EKG for comparison  -QTc is 476, very mild  -Avoid any agents which could worsen prolonged QT interval  -Monitor CMP  -Magnesium/ Phos lab ordered  -Consider cardio c/s  - K+ low, 20 mEq BID       Leukocytosis 2/2 to UTI and/or trauma  -WBC improving on Ceftriaxone  -New increase in WBC likely 2/2 to Pneumonia  -Monitor AM CBC     Anemia 2/2 Blood Loss  - Monitor CBC  - Anemia 9.2/9.9     Hypothyroidism  -Continue home Synthroid 50 mcg  -TSH wnl     Depression and Anxiety  -Continue home Paxil and Buspirone (neither prolong QT)     Dementia  - Son mentioned that her memory has been getting worse since last year when she fell  - She is mostly alert and oriented, but can become confused at times     Bowel Regimen: Miralax BID and Senokot-S BID   DVT Prophylaxis: Lovenox   Diet: Regular Diet, protein supplementation   Code Status: Full Code  PT/OT Eval Status: Rehab w/ PT/OT     Dispo - Inpatient, OVT Friday if medically stable     This patient was seen and evaluated with the resident team and with the attending, Dr. Julissa Broussard.     Jaun Benson DO  Family Medicine Resident PGY1

## 2020-10-14 NOTE — PROGRESS NOTES
Occupational Therapy  Facility/Department: [facility] C5 - MED SURG/ORTHO  Daily Treatment Note  NAME: Erica Clemens  : 1935  MRN: [id number]    Date of Service: 10/14/2020    Discharge Recommendations:  Subacute/Skilled Nursing Facility     Assessment   Performance deficits / Impairments: Decreased functional mobility ; Decreased endurance;Decreased ADL status; Decreased balance;Decreased strength;Decreased cognition;Decreased high-level IADLs;Decreased posture  Assessment: Pt demos need for max A of 2 to complete functional transfers with use of Twining Gricelda. Pt unable to recall hip precautions, however oriented to situation this date. Pt hard of hearing with decreased follow through completing BUE ther ex, requires tactile cueing to complete. Pt would benefit from continued skilled OT to address the above noted occupational performance deficits. Prognosis: Good  OT Education: OT Role;Plan of Care;Orientation;Precautions; ADL Adaptive Strategies;Transfer Training;Energy Conservation  Patient Education: disease specific education: posterior hip precautions, wb status, role of OT  Barriers to Learning: cognition  REQUIRES OT FOLLOW UP: Yes  Activity Tolerance  Activity Tolerance: Patient Tolerated treatment well;Patient limited by pain;Treatment limited secondary to decreased cognition  Safety Devices  Safety Devices in place: Yes  Type of devices: All fall risk precautions in place; Left in chair;Call light within reach;Nurse notified; Patient at risk for falls; Chair alarm in place         Patient Diagnosis(es): The primary encounter diagnosis was Syncope and collapse. Diagnoses of Hip pain, Closed fracture of neck of left femur, initial encounter [facility]), Tra[facility]habdomyolysis, [facility] and Urinary tract infection without hematuria, site unspecified were also pertinent to this visit. has a past medical history of Thyroid disease.    has a past surgical history that includes Total Thyroidectomy of 2 using Amarilis Goldberg)  Stand to sit: Dependent/Total;2 Person assistance(max A of 2 using Amarilis Goldberg)     Cognition  Overall Cognitive Status: Exceptions  Arousal/Alertness: Delayed responses to stimuli  Following Commands: Follows one step commands with repetition; Follows one step commands with increased time  Memory: Decreased recall of precautions     Type of ROM/Therapeutic Exercise  Type of ROM/Therapeutic Exercise: AROM  Exercises  Shoulder Flexion: 15x  Elbow Flexion: 15x  Elbow Extension: 15x  Supination: 15x  Pronation: 15x  Grasp/Release: 15x    Plan   Plan  Times per week: 4-6x  Times per day: Daily  Current Treatment Recommendations: Patient/Caregiver Education & Training, Balance Training, Functional Mobility Training, Endurance Training, Safety Education & Training, Self-Care / ADL, Pain Management, Positioning, Strengthening, Home Management Training    AM-PAC Score  AM-[facility] Inpatient Daily Activity Raw Score: 12 (10/14/20 1219)  AM-PAC Inpatient ADL T-Scale Score : 30.6 (10/14/20 1219)  ADL Inpatient CMS 0-100% Score: 66.57 (10/14/20 1219)  ADL Inpatient CMS G-Code Modifier : CL (10/14/20 1219)    Goals  Short term goals  Time Frame for Short term goals: by 1 week (10/20/20)  Short term goal 1: Complete toilet transfers with mod A x1-- ongoing 10/14/20  Short term goal 2: Tolerate x3 mins of functional standing in order to increase independence for LB ADLs-- ongoing 10/14/20  Short term goal 3: Complete Lb dressing with max with use of AE as needed-- ongoing 10/14/20  Short term goal 4: Pt will verbalize understanding of posterior hip precautions in order to increase safety and independence with ADLs-- ongoing 10/14/20     Therapy Time   Individual Concurrent Group Co-treatment   Time In 1044         Time Out 1129         Minutes Willard 31 CHOI/L

## 2020-10-14 NOTE — CARE COORDINATION
[facility] with [facility] and they can accept pt on Friday 10/16/20 and will take a negative RAPID COVID-19 resulted PTA. Spoke to pt's son Therese Murphy and confirmed DCP to OVM.  CM following-Barbara Sapp RN

## 2020-10-15 LAB
A/G RATIO: 0.9 (ref 1.1–2.2)
ALBUMIN SERPL-MCNC: 2.5 G/DL (ref 3.4–5)
ALP BLD-CCNC: 117 U/L (ref 40–129)
ALT SERPL-CCNC: 35 U/L (ref 10–40)
ANION GAP SERPL CALCULATED.3IONS-SCNC: 7 MMOL/L (ref 3–16)
AST SERPL-CCNC: 40 U/L (ref 15–37)
BASOPHILS ABSOLUTE: 0.1 K/UL (ref 0–0.2)
BASOPHILS RELATIVE PERCENT: 0.5 %
BILIRUB SERPL-MCNC: 0.6 MG/DL (ref 0–1)
BUN BLDV-MCNC: 13 MG/DL (ref 7–20)
CALCIUM SERPL-MCNC: 7.9 MG/DL (ref 8.3–10.6)
CHLORIDE BLD-SCNC: 107 MMOL/L (ref 99–110)
CO2: 25 MMOL/L (ref 21–32)
CREAT SERPL-MCNC: <0.5 MG/DL (ref 0.6–1.2)
EOSINOPHILS ABSOLUTE: 0.4 K/UL (ref 0–0.6)
EOSINOPHILS RELATIVE PERCENT: 3 %
GFR AFRICAN AMERICAN: >60
GFR NON-AFRICAN AMERICAN: >60
GLOBULIN: 2.8 G/DL
GLUCOSE BLD-MCNC: 125 MG/DL (ref 70–99)
HCT VFR BLD CALC: 26.4 % (ref 36–48)
HEMOGLOBIN: 8.9 G/DL (ref 12–16)
LYMPHOCYTES ABSOLUTE: 3.1 K/UL (ref 1–5.1)
LYMPHOCYTES RELATIVE PERCENT: 26.5 %
MCH RBC QN AUTO: 31.6 PG (ref 26–34)
MCHC RBC AUTO-ENTMCNC: 33.8 G/DL (ref 31–36)
MCV RBC AUTO: 93.4 FL (ref 80–100)
MONOCYTES ABSOLUTE: 1.4 K/UL (ref 0–1.3)
MONOCYTES RELATIVE PERCENT: 12 %
NEUTROPHILS ABSOLUTE: 6.9 K/UL (ref 1.7–7.7)
NEUTROPHILS RELATIVE PERCENT: 58 %
PDW BLD-RTO: 13.9 % (ref 12.4–15.4)
PHOSPHORUS: 2.2 MG/DL (ref 2.5–4.9)
PLATELET # BLD: 164 K/UL (ref 135–450)
PMV BLD AUTO: 9.4 FL (ref 5–10.5)
POTASSIUM REFLEX MAGNESIUM: 4 MMOL/L (ref 3.5–5.1)
PROCALCITONIN: 0.17 NG/ML (ref 0–0.15)
RBC # BLD: 2.82 M/UL (ref 4–5.2)
SODIUM BLD-SCNC: 139 MMOL/L (ref 136–145)
TOTAL CK: 372 U/L (ref 26–192)
TOTAL PROTEIN: 5.3 G/DL (ref 6.4–8.2)
WBC # BLD: 11.8 K/UL (ref 4–11)

## 2020-10-15 PROCEDURE — 97535 SELF CARE MNGMENT TRAINING: CPT

## 2020-10-15 PROCEDURE — 36415 COLL VENOUS BLD VENIPUNCTURE: CPT

## 2020-10-15 PROCEDURE — 97110 THERAPEUTIC EXERCISES: CPT

## 2020-10-15 PROCEDURE — 82550 ASSAY OF CK (CPK): CPT

## 2020-10-15 PROCEDURE — 6360000002 HC RX W HCPCS: Performed by: ORTHOPAEDIC SURGERY

## 2020-10-15 PROCEDURE — 6370000000 HC RX 637 (ALT 250 FOR IP): Performed by: ORTHOPAEDIC SURGERY

## 2020-10-15 PROCEDURE — 84145 PROCALCITONIN (PCT): CPT

## 2020-10-15 PROCEDURE — 84100 ASSAY OF PHOSPHORUS: CPT

## 2020-10-15 PROCEDURE — 94761 N-INVAS EAR/PLS OXIMETRY MLT: CPT

## 2020-10-15 PROCEDURE — 2580000003 HC RX 258: Performed by: FAMILY MEDICINE

## 2020-10-15 PROCEDURE — 94640 AIRWAY INHALATION TREATMENT: CPT

## 2020-10-15 PROCEDURE — 1200000000 HC SEMI PRIVATE

## 2020-10-15 PROCEDURE — 6360000002 HC RX W HCPCS: Performed by: FAMILY MEDICINE

## 2020-10-15 PROCEDURE — 2700000000 HC OXYGEN THERAPY PER DAY

## 2020-10-15 PROCEDURE — 94669 MECHANICAL CHEST WALL OSCILL: CPT

## 2020-10-15 PROCEDURE — 85025 COMPLETE CBC W/AUTO DIFF WBC: CPT

## 2020-10-15 PROCEDURE — C9113 INJ PANTOPRAZOLE SODIUM, VIA: HCPCS | Performed by: ORTHOPAEDIC SURGERY

## 2020-10-15 PROCEDURE — 97530 THERAPEUTIC ACTIVITIES: CPT

## 2020-10-15 PROCEDURE — 6370000000 HC RX 637 (ALT 250 FOR IP): Performed by: FAMILY MEDICINE

## 2020-10-15 PROCEDURE — 80053 COMPREHEN METABOLIC PANEL: CPT

## 2020-10-15 PROCEDURE — 2580000003 HC RX 258: Performed by: ORTHOPAEDIC SURGERY

## 2020-10-15 PROCEDURE — 2500000003 HC RX 250 WO HCPCS: Performed by: FAMILY MEDICINE

## 2020-10-15 PROCEDURE — APPNB30 APP NON BILLABLE TIME 0-30 MINS: Performed by: PHYSICIAN ASSISTANT

## 2020-10-15 RX ORDER — VITAMIN B COMPLEX
2000 TABLET ORAL DAILY
Status: COMPLETED | OUTPATIENT
Start: 2020-10-15 | End: 2020-10-15

## 2020-10-15 RX ORDER — CETIRIZINE HYDROCHLORIDE 10 MG/1
10 TABLET ORAL DAILY
Status: DISCONTINUED | OUTPATIENT
Start: 2020-10-15 | End: 2020-10-16 | Stop reason: HOSPADM

## 2020-10-15 RX ORDER — CALCIUM CARBONATE/VITAMIN D3 250-3.125
1 TABLET ORAL DAILY
Status: DISCONTINUED | OUTPATIENT
Start: 2020-10-15 | End: 2020-10-16 | Stop reason: HOSPADM

## 2020-10-15 RX ORDER — VITAMIN B COMPLEX
2000 TABLET ORAL DAILY
Status: DISCONTINUED | OUTPATIENT
Start: 2020-10-15 | End: 2020-10-15

## 2020-10-15 RX ORDER — ACETYLCYSTEINE 200 MG/ML
3 SOLUTION ORAL; RESPIRATORY (INHALATION) 3 TIMES DAILY
Status: DISCONTINUED | OUTPATIENT
Start: 2020-10-15 | End: 2020-10-16 | Stop reason: HOSPADM

## 2020-10-15 RX ADMIN — LEVOTHYROXINE SODIUM 50 MCG: 0.05 TABLET ORAL at 09:21

## 2020-10-15 RX ADMIN — Medication 2000 UNITS: at 13:00

## 2020-10-15 RX ADMIN — ACETYLCYSTEINE 600 MG: 200 SOLUTION ORAL; RESPIRATORY (INHALATION) at 20:16

## 2020-10-15 RX ADMIN — POTASSIUM CHLORIDE 20 MEQ: 20 TABLET, EXTENDED RELEASE ORAL at 23:18

## 2020-10-15 RX ADMIN — Medication 10 ML: at 23:18

## 2020-10-15 RX ADMIN — DIBASIC SODIUM PHOSPHATE, MONOBASIC POTASSIUM PHOSPHATE AND MONOBASIC SODIUM PHOSPHATE 2 TABLET: 852; 155; 130 TABLET ORAL at 09:21

## 2020-10-15 RX ADMIN — Medication 1 MG: at 18:13

## 2020-10-15 RX ADMIN — IPRATROPIUM BROMIDE AND ALBUTEROL SULFATE 1 AMPULE: .5; 3 SOLUTION RESPIRATORY (INHALATION) at 16:03

## 2020-10-15 RX ADMIN — DOXYCYCLINE 100 MG: 100 INJECTION, POWDER, LYOPHILIZED, FOR SOLUTION INTRAVENOUS at 09:22

## 2020-10-15 RX ADMIN — CETIRIZINE HYDROCHLORIDE 10 MG: 10 TABLET, FILM COATED ORAL at 18:13

## 2020-10-15 RX ADMIN — BUSPIRONE HYDROCHLORIDE 5 MG: 5 TABLET ORAL at 09:22

## 2020-10-15 RX ADMIN — DOCUSATE SODIUM 50 MG AND SENNOSIDES 8.6 MG 2 TABLET: 8.6; 5 TABLET, FILM COATED ORAL at 23:18

## 2020-10-15 RX ADMIN — POLYETHYLENE GLYCOL 3350 17 G: 17 POWDER, FOR SOLUTION ORAL at 09:23

## 2020-10-15 RX ADMIN — PAROXETINE HYDROCHLORIDE 40 MG: 20 TABLET, FILM COATED ORAL at 09:22

## 2020-10-15 RX ADMIN — IPRATROPIUM BROMIDE AND ALBUTEROL SULFATE 1 AMPULE: .5; 3 SOLUTION RESPIRATORY (INHALATION) at 11:54

## 2020-10-15 RX ADMIN — CEFTRIAXONE SODIUM 1 G: 1 INJECTION, POWDER, FOR SOLUTION INTRAMUSCULAR; INTRAVENOUS at 09:26

## 2020-10-15 RX ADMIN — ACETYLCYSTEINE 600 MG: 200 SOLUTION ORAL; RESPIRATORY (INHALATION) at 11:55

## 2020-10-15 RX ADMIN — MAGNESIUM HYDROXIDE 30 ML: 2400 SUSPENSION ORAL at 15:24

## 2020-10-15 RX ADMIN — DIBASIC SODIUM PHOSPHATE, MONOBASIC POTASSIUM PHOSPHATE AND MONOBASIC SODIUM PHOSPHATE 2 TABLET: 852; 155; 130 TABLET ORAL at 23:17

## 2020-10-15 RX ADMIN — ENOXAPARIN SODIUM 40 MG: 40 INJECTION SUBCUTANEOUS at 09:22

## 2020-10-15 RX ADMIN — DOCUSATE SODIUM 50 MG AND SENNOSIDES 8.6 MG 2 TABLET: 8.6; 5 TABLET, FILM COATED ORAL at 09:21

## 2020-10-15 RX ADMIN — POTASSIUM CHLORIDE 20 MEQ: 20 TABLET, EXTENDED RELEASE ORAL at 09:23

## 2020-10-15 RX ADMIN — OXYCODONE HYDROCHLORIDE 10 MG: 10 TABLET, FILM COATED, EXTENDED RELEASE ORAL at 09:21

## 2020-10-15 RX ADMIN — Medication 10 ML: at 09:23

## 2020-10-15 RX ADMIN — PANTOPRAZOLE SODIUM 40 MG: 40 INJECTION, POWDER, FOR SOLUTION INTRAVENOUS at 09:22

## 2020-10-15 RX ADMIN — Medication 250 MG: at 09:22

## 2020-10-15 RX ADMIN — DOXYCYCLINE 100 MG: 100 INJECTION, POWDER, LYOPHILIZED, FOR SOLUTION INTRAVENOUS at 23:18

## 2020-10-15 RX ADMIN — IPRATROPIUM BROMIDE AND ALBUTEROL SULFATE 1 AMPULE: .5; 3 SOLUTION RESPIRATORY (INHALATION) at 20:16

## 2020-10-15 RX ADMIN — DIBASIC SODIUM PHOSPHATE, MONOBASIC POTASSIUM PHOSPHATE AND MONOBASIC SODIUM PHOSPHATE 2 TABLET: 852; 155; 130 TABLET ORAL at 15:25

## 2020-10-15 RX ADMIN — POLYETHYLENE GLYCOL 3350 17 G: 17 POWDER, FOR SOLUTION ORAL at 23:18

## 2020-10-15 ASSESSMENT — PAIN DESCRIPTION - LOCATION: LOCATION: HIP;GROIN

## 2020-10-15 ASSESSMENT — PAIN SCALES - GENERAL
PAINLEVEL_OUTOF10: 5
PAINLEVEL_OUTOF10: 9
PAINLEVEL_OUTOF10: 9

## 2020-10-15 ASSESSMENT — PAIN DESCRIPTION - PAIN TYPE: TYPE: SURGICAL PAIN

## 2020-10-15 ASSESSMENT — PAIN DESCRIPTION - ORIENTATION: ORIENTATION: LEFT

## 2020-10-15 NOTE — PROGRESS NOTES
10/12/2020). Restrictions  Restrictions/Precautions  Restrictions/Precautions: Weight Bearing, ROM Restrictions, General Precautions, Fall Risk  Lower Extremity Weight Bearing Restrictions  Left Lower Extremity Weight Bearing: Partial Weight Bearing  Partial Weight Bearing Percentage Or Pounds: 50%  Position Activity Restriction  Hip Precautions: No hip flexion > 90 degrees, No ADduction, No hip internal rotation, Posterior hip precautions  Other position/activity restrictions: posterior precautions     Subjective   General  Chart Reviewed: Yes, Orders, Progress Notes, History and Physical, Imaging, Operative Notes  Patient assessed for rehabilitation services?: Yes  Response to previous treatment: Patient with no complaints from previous session  Family / Caregiver Present: No  Referring Practitioner: Adam Dillon MD  Diagnosis: Fall s/p L peg-arthroplasty    Subjective  Subjective: Pt resting in bed, agreeable to OT treatment. Pain Assessment  Pain Assessment: 0-10  Pain Level: 5  Pain Type: Surgical pain  Pain Location: Hip;Groin  Pain Orientation: Left  Non-Pharmaceutical Pain Intervention(s): Repositioned  Pre Treatment Pain Screening  Intervention List: Patient able to continue with treatment  Vital Signs  Patient Currently in Pain: Yes     Orientation  Orientation  Orientation Level: Oriented to person;Oriented to situation;Disoriented to time;Oriented to place     Objective    ADL  Grooming: Setup;Supervision(washing face and combing hair seated)  Toileting: Dependent/Total(purewick)     Balance  Sitting Balance:  Moderate assistance(initially mod A, progressed to CGA)  Standing Balance: Dependent/Total(max A of 2 on Alyssia STEDY)  Standing Balance  Activity: bed to chair with use of Curlee Lean, 1 min static stand on Alyssia STEDY    Bed mobility  Supine to Sit: Dependent/Total;2 Person assistance(max A of 2 to pt R)     Transfers  Sit to stand: Dependent/Total;2 Person assistance(max A of 2 using Naina Carp TIAGO)  Stand to sit: Dependent/Total;2 Person assistance(max A of 2)     Cognition  Overall Cognitive Status: Exceptions  Following Commands: Follows one step commands with repetition; Follows one step commands with increased time  Memory: Decreased recall of precautions  Safety Judgement: Decreased awareness of need for assistance  Problem Solving: Assistance required to generate solutions;Assistance required to correct errors made;Assistance required to implement solutions  Insights: Decreased awareness of deficits  Initiation: Requires cues for some  Sequencing: Requires cues for some     Type of ROM/Therapeutic Exercise  Type of ROM/Therapeutic Exercise: AROM  Comment: pt requires tactile cueing  Exercises  Shoulder Flexion: 15x  Shoulder ABduction: 15x  Shoulder ADduction: 15x  Elbow Flexion: 15x  Elbow Extension: 15x      Plan   Plan  Times per week: 4-6x  Times per day: Daily  Current Treatment Recommendations: Patient/Caregiver Education & Training, Balance Training, Functional Mobility Training, Endurance Training, Safety Education & Training, Self-Care / ADL, Pain Management, Positioning, Strengthening, Home Management Training    AM-PAC Score  AM-[facility] Inpatient Daily Activity Raw Score: 12 (10/15/20 0949)  AM-PAC Inpatient ADL T-Scale Score : 30.6 (10/15/20 0949)  ADL Inpatient CMS 0-100% Score: 66.57 (10/15/20 0949)  ADL Inpatient CMS G-Code Modifier : CL (10/15/20 0949)    Goals  Short term goals  Time Frame for Short term goals: by 1 week (10/20/20)  Short term goal 1: Complete toilet transfers with mod A x1-- ongoing 10/15/20  Short term goal 2: Tolerate x3 mins of functional standing in order to increase independence for LB ADLs-- ongoing 10/15/20  Short term goal 3: Complete Lb dressing with max with use of AE as needed-- ongoing 10/15/20  Short term goal 4: Pt will verbalize understanding of posterior hip precautions in order to increase safety and independence with ADLs-- ongoing 10/15/20 Therapy Time   Individual Concurrent Group Co-treatment   Time In 0830         Time Out 0909         Minutes [address]/L

## 2020-10-15 NOTE — CARE COORDINATION
CM spoke to Dr. Corinna Waters group and updated that pt will likely dc to SNF tomorrow. Spoke to Mol at [facility] and they will be able to accept pt tomorrow and will need a negative RAPID COVID resulted PTA.  CM following-Barbara Sapp RN

## 2020-10-15 NOTE — PROGRESS NOTES
RESPIRATORY THERAPY ASSESSMENT    Name:  Carmelina Rivero  Medical Record Number:  [id number]  Age: 81 y.o. Gender: female  : 1935  Today's Date:  10/15/2020  Room:  0550/0550-01    Assessment     Is the patient being admitted for a COPD or Asthma exacerbation? No   (If yes the patient will be seen every 4 hours for the first 24 hours and then reassessed)    Patient Admission Diagnosis      Allergies  Allergies   Allergen Reactions    No Known Allergies        Minimum Predicted Vital Capacity:    924          Actual Vital Capacity:      Unable               Pulmonary History:No history  Home Oxygen Therapy:  unknown  Home Respiratory Therapy:None   Current Respiratory Therapy:  duoneb q4  Treatment Type: HHN, Vibratory mucous clearing therapy or intervention  Medications: Albuterol/Ipratropium    Respiratory Severity Index(RSI)   Patients with orders for inhalation medications, oxygen, or any therapeutic treatment modality will be placed on Respiratory Protocol. They will be assessed with the first treatment and at least every 72 hours thereafter. The following severity scale will be used to determine frequency of treatment intervention.     Smoking History: No Smoking History = 0    Social History  Social History     Tobacco Use    Smoking status: Never Smoker    Smokeless tobacco: Never Used   Substance Use Topics    Alcohol use: Never     Frequency: Never    Drug use: Never       Recent Surgical History: None = 0  Past Surgical History  Past Surgical History:   Procedure Laterality Date    HIP SURGERY Left 10/12/2020    LEFT HIP HEMIARTHROPLASTY performed by Leda Mendoza MD at [address] [place] [address]         Level of Consciousness: Alert, Follows Commands but Disoriented = 1    Level of Activity: Mostly sedentary, minimal walking = 2    Respiratory Pattern: Regular Pattern; RR 8-20 = 0    Breath Sounds: Diminished unilaterally = 1    Sputum  Sputum Color: None, Tenacity: None, Sputum How Obtained: Spontaneous cough  Cough: Strong, spontaneous, non-productive = 0    Vital Signs   /70   Pulse 91   Temp 98.9 °F (37.2 °C) (Oral)   Resp 18   Ht 5' 7\" (1.702 m)   Wt 159 lb (72.1 kg)   SpO2 91%   BMI 24.90 kg/m²   SPO2 (COPD values may differ): 90-91% on room air or greater than 92% on FiO2 24- 28% = 1    Peak Flow (asthma only): not applicable = 0    RSI: 5-6 = Q4hr PRN (every four hours as needed) for dyspnea        Plan       Goals: mobilize retained secretions, volume expansion and improve oxygenation    Patient/caregiver was educated on the proper method of use for Respiratory Care Devices:  Yes      Level of patient/caregiver understanding able to:   ? Verbalize understanding   ? Demonstrate understanding       ? Teach back        ? Needs reinforcement       ? No available caregiver               ? Other:     Response to education:  Excellent     Is patient being placed on Home Treatment Regimen? No     Does the patient have everything they need prior to discharge? NA     Comments: patient assessed chart reviewed    Plan of Care: continue current reg    Electronically signed by Richie Denny RCP on 10/15/2020 at 4:11 PM    Respiratory Protocol Guidelines     1. Assessment and treatment by Respiratory Therapy will be initiated for medication and therapeutic interventions upon initiation of aerosolized medication. 2. Physician will be contacted for respiratory rate (RR) greater than 35 breaths per minute. Therapy will be held for heart rate (HR) greater than 140 beats per minute, pending direction from physician. 3. Bronchodilators will be administered via Metered Dose Inhaler (MDI) with spacer when the following criteria are met:  a. Alert and cooperative     b. HR < 140 bpm  c. RR < 30 bpm                d. Can demonstrate a 2-3 second inspiratory hold  4.  Bronchodilators will be administered via Hand Held Nebulizer ONEL [facility]3]) to patients when ANY of the following criteria are met  a. Incognizant or uncooperative          b. Patients treated with HHN at Home        c. Unable to demonstrate proper use of MDI with spacer     d. RR > 30 bpm   5. Bronchodilators will be delivered via Metered Dose Inhaler (MDI), HHN, Aerogen to intubated patients on mechanical ventilation. 6. Inhalation medication orders will be delivered and/or substituted as outlined below. Aerosolized Medications Ordering and Administration Guidelines:    1. All Medications will be ordered by a physician, and their frequency and/or modality will be adjusted as defined by the patients Respiratory Severity Index (RSI) score. 2. If the patient does not have documented COPD, consider discontinuing anticholinergics when RSI is less than 9.  3. If the bronchospasm worsens (increased RSI), then the bronchodilator frequency can be increased to a maximum of every 4 hours. If greater than every 4 hours is required, the physician will be contacted. 4. If the bronchospasm improves, the frequency of the bronchodilator can be decreased, based on the patient's RSI, but not less than home treatment regimen frequency. 5. Bronchodilator(s) will be discontinued if patient has a RSI less than 9 and has received no scheduled or as needed treatment for 72  Hrs. Patients Ordered on a Mucolytic Agent:    1. Must always be administered with a bronchodilator. 2. Discontinue if patient experiences worsened bronchospasm, or secretions have lessened to the point that the patient is able to clear them with a cough. Anti-inflammatory and Combination Medications:    1. If the patient lacks prior history of lung disease, is not using inhaled anti-inflammatory medication at home, and lacks wheezing by examination or by history for at least 24 hours, contact physician for possible discontinuation.

## 2020-10-15 NOTE — PROGRESS NOTES
Inpatient Family Medicine Progress Note      PCP: Cassandra Vidal    Date of Admission: 10/11/2020    Chief Complaint:  Fall w/ Left Femoral Neck Fracture s/p [address]ra [address]. Course:      81 y. o. female with PMH significant for Dementia, Depression and Hypothyroidism, who presented to [facility] 10/11/2020 with a fall which believes occurred around noon.  Pt states she had a BM on the toilet and then woke up in the floor. Barron Recinos is unsure if she hit her head, but noted that her right hip was hurting and she could not get up. EMS found her awake lying in the bathroom floor and brought her to ED.     In the ED:  CT of pelvis showed comminuted left femoral neck fracture  CT head showed no intracranial abnormality  CT neck no acute cervical spine abnormality  CXR showed no acute posttraumatic abnormality     EKG: NSR, 88 bpm, Prolonged QT, Nonspecific T wave abnormality, no prior EKG for comparison     UA: +Nitrites, +Leukocyte esterase, +Blood, +Protein, WBC , RBC 11-20, bacteria 4+     WBC: 16.4  Total CK: 2872  BUN: 32  Cr: 0.5  Troponin: < 0.01     Rec'd Dose of ceftriaxone, 1000 ml of NS     On the floor, ceftriaxone and fluids continued.      Surgery on 10/12/2020  LEFT HIP HEMIARTHROPLASTY FOR FEMORAL NECK FRACTURE      Surgery went well and patient had no complaints following surgery. Pt WBCs increased, pt has new oxygen requirement, and elevated pro-calcitonin.    Doxycycline added to abx regimen.        Subjective:     POD#3  No complaints at this time  States pain 5/10  Eating well   No BMs    Medications:  Reviewed    Infusion Medications    sodium chloride 125 mL/hr at 10/14/20 2056     Scheduled Medications    oyster shell calcium/vitamin D  1 tablet Oral Daily    acetylcysteine  3 mL Inhalation TID    magnesium hydroxide  30 mL Oral Daily    phosphorus  500 mg Oral 4x Daily    doxycycline (VIBRAMYCIN) IV  100 mg Intravenous Q12H    cefTRIAXone (ROCEPHIN) IV  1 g Intravenous Q24H    sennosides-docusate sodium  2 tablet Oral BID    oxyCODONE  10 mg Oral Daily    ipratropium-albuterol  1 ampule Inhalation 4x daily    potassium chloride  20 mEq Oral BID    busPIRone  5 mg Oral Daily    levothyroxine  50 mcg Oral Daily    PARoxetine  40 mg Oral Daily    pantoprazole  40 mg Intravenous Daily    polyethylene glycol  17 g Oral BID    melatonin ER  1 mg Oral QPM    sodium chloride flush  10 mL Intravenous 2 times per day    enoxaparin  40 mg Subcutaneous Daily    lidocaine  1 patch Transdermal Daily     PRN Meds: promethazine **OR** [DISCONTINUED] ondansetron, morphine **OR** morphine, bisacodyl, sodium chloride flush, oxyCODONE **OR** oxyCODONE    No intake or output data in the 24 hours ending 10/15/20 1555    Physical Exam Performed:    /70   Pulse 91   Temp 98.9 °F (37.2 °C) (Oral)   Resp 18   Ht 5' 7\" (1.702 m)   Wt 159 lb (72.1 kg)   SpO2 93%   BMI 24.90 kg/m²     General appearance: Sitting in chair comfortably, Able to converse meaningfully     HEENT: Pupils equal, round, and reactive to light. Conjunctivae/corneas clear. Neck: Supple, with full range of motion. Trachea midline. Respiratory:  Normal respiratory effort. Bibasilar crackles  Cardiovascular: Regular rate and rhythm with normal S1/S2 without murmurs, rubs or gallops. Abdomen: Soft, non-tender, non-distended with normal bowel sounds. Musculoskeletal: No clubbing, cyanosis or edema bilaterally. Full range of motion without deformity. Skin: Warm and dry  No rashes or lesions. Neurologic:  Neurovascularly intact without any focal sensory/motor deficits.    Psychiatric: Alert, thought content appropriate, normal insight  Peripheral Pulses: +2 palpable, equal bilaterally       Labs:   Recent Labs     10/13/20  0829 10/14/20  0641 10/15/20  0620   WBC 12.6* 13.1* 11.8*   HGB 9.9* 9.2* 8.9*   HCT 30.5* 27.9* 26.4*    166 164     Recent Labs     10/13/20  0829 10/14/20  0641 years or more - Nodule 1.5 cm in size or greater2. Follow up thyroid ultrasound also recommend in these scenarios:      ~Solitary nodule with high risk imaging features (locally invasive nodule   or suspicious lymph nodes). ~Any nodule in a heterogeneous enlarged thyroid gland. NO further imaging is recommended in the following scenarios:      ~No f/u imaging is recommended for ITNs not meeting the above criteria.      ~No US or f/u recommended for ITNs without high risk features or with   limited life expectancy or significant co-morbidities, unless clinically   warranted. Note: These recommendations do not apply if increased risk for thyroid cancer   or symptomatic thyroid disease. Recommendations for f/u of Incidental Thyroid Nodules (ITN) found on CT, MR,   NM and Extrathyroidal US are based upon the ACR white paper and [facility] 3-tiered   system for managing ITNs:J Am Deja Radiol. 2015 Feb;12(2): 143-50         XR HIP 2-3 VW W PELVIS RIGHT   Final Result   No acute posttraumatic abnormality detected. Chronic appearing fracture deformity of the left hip. XR CHEST PORTABLE   Final Result   No acute posttraumatic abnormality detected. Chronic appearing fracture deformity of the left hip. Assessment/Plan:    Active Hospital Problems    Diagnosis Date Noted    Elevated LFTs [R79.89] 10/13/2020    Closed displaced fracture of left femoral neck with malunion [S72.002P] 10/12/2020    UTI (urinary tract infection) [N39.0] 10/12/2020    Leukocytosis [D72.829] 10/12/2020    Elevated CK [R74.8] 10/12/2020    Fall [W19. XXXA] 10/12/2020    Prolonged QT interval [R94.31] 10/12/2020    Osteoarthritis [M19.90] 06/30/2014       PLAN:     Left femoral neck fracture   -CT of pelvis showed comminuted left femoral neck fracture  - Surgery 10/12/2020     Plan of care per Ortho  - Status post left hip hemiarthroplasty  - Okay for 50% weightbearing left lower extremity with posterior hip precautions  - Pain control, postop antibiotics  - Bowel Regimen, added Milk of Magnesia d/t constipation  - PT/OT  - Rehab available when medically stable     > Oxy 5 BID, Tylenol stopped due to elevated LFTs  - Vitamin D level ordered, wnl  >  Bisphosphanate OP/ DEXA  > Low protein, add supplementation to meals     Pneumonia   - Overnight tachycardia, low grade fever, increase WBC   - CXR shows changes consistent with atelectasis vs. Pneumonia  - Crackles in LLL on physical exam   - Duonebs and Incentive Spirometry  - Start Doxycycline BID  - Start Mucomyst and Acapella  - Fluids 125 ml/ hr     Fall 2/2 to vasovagal vs UTI   - Low risk for arrhythmia/ cardiac cause  - - Sl Increased Qtc, Troponin <0.01  - - TSH wnl  -CT head showed no intracranial abnormality  -CT neck no acute cervical spine abnormality  -Monitor tele     UTI  -UA: +Nitrites, +Leukocyte esterase, +Blood, +Protein, WBC , RBC 11-20, bacteria 4+  -Continue Ceftriaxone, E. Coli coverage, sensitive   -Continue NS     Rhabdomyolysis with elevated CK 2/2 to fall  -Total CK: 641, trending down  -BUN: 15  -Cr: 0.5  -Monitor CMP  -Phosphorus low, increase 500 mg QID, s/p Vit D 2000   -Continue IVF     Prolonged QT Interval  -EKG: NSR, 88 bpm, Prolonged QT, Nonspecific T wave abnormality, no prior EKG for comparison  -QTc is 476, very mild  -Avoid any agents which could worsen prolonged QT interval  -Monitor CMP  -Magnesium/ Phos lab ordered  -Consider cardio c/s  - K+ low, 20 mEq BID        Leukocytosis 2/2 to UTI and/or trauma  -WBC improving on Ceftriaxone  -New increase in WBC likely 2/2 to Pneumonia  -Monitor AM CBC     Anemia 2/2 Blood Loss  - Monitor CBC  - Anemia 9.2/9.9/8.9  - 2/2 to blood loss      Hypothyroidism  -Continue home Synthroid 50 mcg  -TSH wnl     Depression and Anxiety  -Continue home Paxil and Buspirone (neither prolong QT)     Dementia  - Son mentioned that her memory has been getting worse since last year when

## 2020-10-15 NOTE — PROGRESS NOTES
Physical Therapy  Facility/Department: [facility] C5 - MED SURG/ORTHO  Daily Treatment Note  NAME: Ashlyn Colin  : 1935  MRN: [id number]    Date of Service: 10/15/2020    Discharge Recommendations:  Subacute/Skilled Nursing Facility        Assessment   Body structures, Functions, Activity limitations: Decreased functional mobility ; Decreased ADL status; Decreased ROM; Decreased strength;Decreased cognition;Decreased endurance; Increased pain;Decreased posture;Decreased high-level IADLs;Decreased safe awareness  Assessment: pt currently requires max A of 2 to complete bed mobility and bed to chair transfers from elevated bed height with Elinor Sullivan. pt with noted cognitive deficits however is also limited by [facility]. With previous need for assistance and lack of constant support; Current PT recommendation for d/c is SNF. Treatment Diagnosis: Reduced functional mobility  Prognosis: Fair  Decision Making: Low Complexity  REQUIRES PT FOLLOW UP: Yes  Activity Tolerance  Activity Tolerance: Patient Tolerated treatment well;Patient limited by fatigue;Patient limited by pain; Patient limited by cognitive status     Patient Diagnosis(es): The primary encounter diagnosis was Syncope and collapse. Diagnoses of Hip pain, Closed fracture of neck of left femur, initial encounter [facility]), Traumatic rhabdomyolysis, initial encounter ([address] and Urinary tract infection without hematuria, site unspecified were also pertinent to this visit. has a past medical history of Thyroid disease. has a past surgical history that includes Total Thyroidectomy and hip surgery (Left, 10/12/2020).     Restrictions  Restrictions/Precautions  Restrictions/Precautions: Weight Bearing, ROM Restrictions, General Precautions, Fall Risk  Lower Extremity Weight Bearing Restrictions  Left Lower Extremity Weight Bearing: Partial Weight Bearing  Partial Weight Bearing Percentage Or Pounds: 50%  Position Activity Restriction  Hip Precautions: No hip flexion > 90 degrees, No ADduction, No hip internal rotation, Posterior hip precautions  Other position/activity restrictions: posterior precautions  Subjective   General  Chart Reviewed: Yes  Family / Caregiver Present: No  Referring Practitioner: Leda Mendoza MD  Subjective  Subjective: Pt agrees to PT  General Comment  Comments: pt supine in bed upon arrival  Pain Screening  Patient Currently in Pain: Yes  Pain Assessment  Pain Assessment: 0-10  Pain Level: 5  Non-Pharmaceutical Pain Intervention(s): Ambulation/Increased Activity;Repositioned  Vital Signs  Patient Currently in Pain: Yes       Orientation  Orientation  Overall Orientation Status: Impaired  Orientation Level: Disoriented to time;Oriented to person;Oriented to situation;Oriented to place  Cognition      Objective   Bed mobility  Supine to Sit: Dependent/Total;2 Person assistance  Transfers  Sit to Stand: 2 Person Assistance;Maximum Assistance;Dependent/Total(fabiano stedy)  Stand to sit: 2 Person Assistance;Maximum Assistance;Dependent/Total  Bed to Chair: Dependent/Total(fabiano stedy)  Ambulation  Ambulation?: No     Balance  Comments: Sitting Balance:  Moderate assistance(initially mod A, progressed to CGA)  Standing Balance: Dependent/Total(max A of 2 on Fabiano STEDY)  Exercises  Heelslides: x 10 BILAT AAROM  Gluteal Sets: x 10 BILAT  Knee Long Arc Quad: x 10 BILAT AAROM  Knee Short Arc Quad: x 10 BILAT AAROM  Ankle Pumps: x 20 BILAT AAROM         Comment: Activity: bed to chair with use of Geroge Ehrich, 1 min static stand on Geroge Ehrich            AM-PAC Score  -PAC Inpatient Mobility Raw Score : 10 (10/15/20 1247)  AM-PAC Inpatient T-Scale Score : 32.29 (10/15/20 1247)  Mobility Inpatient CMS 0-100% Score: 76.75 (10/15/20 1247)  Mobility Inpatient CMS G-Code Modifier : CL (10/15/20 1247)          Goals  Short term goals  Time Frame for Short term goals: 4 days - 10/17/20 - unless otherwise specified  Short term goal 1: pt able to supine <> sit with Kayy ; -10/15 Max Ax2  Short term goal 2: pt able to sit <> stand with Kayy; -10/15 Max Ax2  Short term goal 3: pt able to complete BILAT LE ther ex for 12-15 reps by 10/15/20; -10/15 AAROM x 10  Short term goal 4: pt able to ambulate 25 ft with RW and Kayy; -10/15 dependent  Patient Goals   Patient goals : \"to walk around my house (approx 48') with my daughter (supervision/sba)\"    Plan    Plan  Times per week: Once daily 7x/week  Times per day: Daily  Current Treatment Recommendations: Strengthening, ROM, Balance Training, Functional Mobility Training, Transfer Training, Stair training, ADL/Self-care Training, Gait Training, Endurance Training, IADL Training, Pain Management, Equipment Evaluation, Education, & procurement, Safety Education & Training, Home Exercise Program  Safety Devices  Type of devices:  All fall risk precautions in place, Left in chair, Call light within reach, Chair alarm in place, Nurse notified, Gait belt, Patient at risk for falls     Therapy Time   Individual Concurrent Group Co-treatment   Time In 0833         Time Out 0911         Minutes 38         Timed Code Treatment Minutes: [address]

## 2020-10-15 NOTE — PROGRESS NOTES
Department of Orthopedic Surgery  Physician Assistant   Progress Note    Subjective:       Systemic or Specific Complaints:  Pt states left hip is sore today. She is sitting up in the chair this am.      Objective:     Patient Vitals for the past 24 hrs:   BP Temp Temp src Pulse Resp SpO2   10/15/20 0352 128/74 98.8 °F (37.1 °C) Oral 95 19 96 %   10/14/20 2353 -- -- -- -- -- 95 %   10/14/20 2352 -- -- -- -- -- 97 %   10/14/20 2327 (!) 148/70 100 °F (37.8 °C) Oral 107 20 91 %   10/14/20 2045 (!) 143/67 98.5 °F (36.9 °C) Oral 111 18 --   10/14/20 1937 -- -- -- -- -- 91 %   10/14/20 1442 (!) 152/74 98.8 °F (37.1 °C) Oral 93 17 92 %   10/14/20 1243 (!) 173/69 97.9 °F (36.6 °C) Oral 99 18 95 %       General: alert, appears stated age, cooperative and no distress   Wound: Wound clean and dry no evidence of infection. Motion: Painful range of Motion in affected extremity   DVT Exam: No evidence of DVT seen on physical exam.     Additional exam:  NVI to left LE  mepilex dressing in place  Thigh soft but swollen  Moving toes without difficulty    Data Review  CBC:   Lab Results   Component Value Date    WBC 11.8 10/15/2020    RBC 2.82 10/15/2020    HGB 8.9 10/15/2020    HCT 26.4 10/15/2020     10/15/2020       Renal:   Lab Results   Component Value Date     10/15/2020    K 4.0 10/15/2020     10/15/2020    CO2 25 10/15/2020    BUN 13 10/15/2020    CREATININE <0.5 10/15/2020    GLUCOSE 125 10/15/2020    CALCIUM 7.9 10/15/2020            Assessment:      left hip hemiarthroplasty, POD #3. Plan:      1:  Continue current plan of care per IM. Labs reviewed and stable post op. Confusion intermittently. UTI noted, IV abx continued. 2:  Continue Deep venous thrombosis prophylaxis- lovenox  3:  Continue Pain Control PRN, monitor with MS  4:  PT and OT, 50% WB on left LE, posterior hip precautions  5:  D/c planning for SNF placement most likely, DCP updated.  [facility] has been contacted and may accept tomorrow. 6:  Ice to hip. Instructions placed for follow up with Dr. Lacy Unger. Will follow peripherally.       Rosalba Emanuel PA-C

## 2020-10-16 VITALS
BODY MASS INDEX: 24.96 KG/M2 | SYSTOLIC BLOOD PRESSURE: 153 MMHG | RESPIRATION RATE: 16 BRPM | TEMPERATURE: 99 F | DIASTOLIC BLOOD PRESSURE: 75 MMHG | WEIGHT: 159 LBS | HEART RATE: 90 BPM | HEIGHT: 67 IN | OXYGEN SATURATION: 92 %

## 2020-10-16 PROBLEM — R06.2 WHEEZING: Status: ACTIVE | Noted: 2020-10-16

## 2020-10-16 PROBLEM — Z99.81 REQUIRES SUPPLEMENTAL OXYGEN: Status: ACTIVE | Noted: 2020-10-16

## 2020-10-16 PROBLEM — J18.9 PNEUMONIA: Status: ACTIVE | Noted: 2020-10-16

## 2020-10-16 LAB
A/G RATIO: 0.8 (ref 1.1–2.2)
ALBUMIN SERPL-MCNC: 2.4 G/DL (ref 3.4–5)
ALP BLD-CCNC: 131 U/L (ref 40–129)
ALT SERPL-CCNC: 34 U/L (ref 10–40)
ANION GAP SERPL CALCULATED.3IONS-SCNC: 9 MMOL/L (ref 3–16)
AST SERPL-CCNC: 37 U/L (ref 15–37)
BASOPHILS ABSOLUTE: 0.1 K/UL (ref 0–0.2)
BASOPHILS RELATIVE PERCENT: 0.6 %
BILIRUB SERPL-MCNC: 0.6 MG/DL (ref 0–1)
BUN BLDV-MCNC: 10 MG/DL (ref 7–20)
CALCIUM SERPL-MCNC: 8.1 MG/DL (ref 8.3–10.6)
CHLORIDE BLD-SCNC: 101 MMOL/L (ref 99–110)
CO2: 26 MMOL/L (ref 21–32)
CREAT SERPL-MCNC: <0.5 MG/DL (ref 0.6–1.2)
EOSINOPHILS ABSOLUTE: 0.3 K/UL (ref 0–0.6)
EOSINOPHILS RELATIVE PERCENT: 2.9 %
GFR AFRICAN AMERICAN: >60
GFR NON-AFRICAN AMERICAN: >60
GLOBULIN: 2.9 G/DL
GLUCOSE BLD-MCNC: 113 MG/DL (ref 70–99)
HCT VFR BLD CALC: 24.7 % (ref 36–48)
HEMOGLOBIN: 8.3 G/DL (ref 12–16)
LYMPHOCYTES ABSOLUTE: 3.1 K/UL (ref 1–5.1)
LYMPHOCYTES RELATIVE PERCENT: 27.4 %
MCH RBC QN AUTO: 31.4 PG (ref 26–34)
MCHC RBC AUTO-ENTMCNC: 33.4 G/DL (ref 31–36)
MCV RBC AUTO: 93.8 FL (ref 80–100)
MONOCYTES ABSOLUTE: 1.2 K/UL (ref 0–1.3)
MONOCYTES RELATIVE PERCENT: 10.9 %
NEUTROPHILS ABSOLUTE: 6.5 K/UL (ref 1.7–7.7)
NEUTROPHILS RELATIVE PERCENT: 58.2 %
PDW BLD-RTO: 13.7 % (ref 12.4–15.4)
PHOSPHORUS: 2.3 MG/DL (ref 2.5–4.9)
PLATELET # BLD: 189 K/UL (ref 135–450)
PMV BLD AUTO: 9.3 FL (ref 5–10.5)
POTASSIUM REFLEX MAGNESIUM: 4.4 MMOL/L (ref 3.5–5.1)
PROCALCITONIN: 0.12 NG/ML (ref 0–0.15)
RBC # BLD: 2.63 M/UL (ref 4–5.2)
SARS-COV-2, NAAT: NOT DETECTED
SODIUM BLD-SCNC: 136 MMOL/L (ref 136–145)
TOTAL CK: 305 U/L (ref 26–192)
TOTAL PROTEIN: 5.3 G/DL (ref 6.4–8.2)
WBC # BLD: 11.2 K/UL (ref 4–11)

## 2020-10-16 PROCEDURE — 6370000000 HC RX 637 (ALT 250 FOR IP): Performed by: ORTHOPAEDIC SURGERY

## 2020-10-16 PROCEDURE — 94669 MECHANICAL CHEST WALL OSCILL: CPT

## 2020-10-16 PROCEDURE — 85025 COMPLETE CBC W/AUTO DIFF WBC: CPT

## 2020-10-16 PROCEDURE — 80053 COMPREHEN METABOLIC PANEL: CPT

## 2020-10-16 PROCEDURE — 84145 PROCALCITONIN (PCT): CPT

## 2020-10-16 PROCEDURE — 94761 N-INVAS EAR/PLS OXIMETRY MLT: CPT

## 2020-10-16 PROCEDURE — 84100 ASSAY OF PHOSPHORUS: CPT

## 2020-10-16 PROCEDURE — 36415 COLL VENOUS BLD VENIPUNCTURE: CPT

## 2020-10-16 PROCEDURE — 6370000000 HC RX 637 (ALT 250 FOR IP): Performed by: FAMILY MEDICINE

## 2020-10-16 PROCEDURE — U0002 COVID-19 LAB TEST NON-CDC: HCPCS

## 2020-10-16 PROCEDURE — 6360000002 HC RX W HCPCS: Performed by: ORTHOPAEDIC SURGERY

## 2020-10-16 PROCEDURE — 82550 ASSAY OF CK (CPK): CPT

## 2020-10-16 PROCEDURE — 2580000003 HC RX 258: Performed by: FAMILY MEDICINE

## 2020-10-16 PROCEDURE — 97535 SELF CARE MNGMENT TRAINING: CPT

## 2020-10-16 PROCEDURE — 97530 THERAPEUTIC ACTIVITIES: CPT

## 2020-10-16 PROCEDURE — 2580000003 HC RX 258: Performed by: ORTHOPAEDIC SURGERY

## 2020-10-16 PROCEDURE — 2700000000 HC OXYGEN THERAPY PER DAY

## 2020-10-16 PROCEDURE — 2500000003 HC RX 250 WO HCPCS: Performed by: FAMILY MEDICINE

## 2020-10-16 PROCEDURE — 6360000002 HC RX W HCPCS: Performed by: FAMILY MEDICINE

## 2020-10-16 PROCEDURE — C9113 INJ PANTOPRAZOLE SODIUM, VIA: HCPCS | Performed by: ORTHOPAEDIC SURGERY

## 2020-10-16 PROCEDURE — 94640 AIRWAY INHALATION TREATMENT: CPT

## 2020-10-16 RX ORDER — POLYETHYLENE GLYCOL 3350 17 G/17G
17 POWDER, FOR SOLUTION ORAL DAILY
Qty: 527 G | Refills: 1 | Status: SHIPPED | OUTPATIENT
Start: 2020-10-16

## 2020-10-16 RX ORDER — SENNA AND DOCUSATE SODIUM 50; 8.6 MG/1; MG/1
1 TABLET, FILM COATED ORAL 2 TIMES DAILY
Qty: 60 TABLET | Refills: 1 | Status: ON HOLD | OUTPATIENT
Start: 2020-10-16 | End: 2021-02-24

## 2020-10-16 RX ORDER — CALCIUM CARBONATE/VITAMIN D3 250-3.125
1 TABLET ORAL DAILY
Qty: 30 TABLET | Refills: 2 | Status: CANCELLED | OUTPATIENT
Start: 2020-10-16

## 2020-10-16 RX ORDER — FLUTICASONE PROPIONATE 44 UG/1
2 AEROSOL, METERED RESPIRATORY (INHALATION) 2 TIMES DAILY
Qty: 1 INHALER | Refills: 3 | Status: SHIPPED | OUTPATIENT
Start: 2020-10-16

## 2020-10-16 RX ORDER — PAROXETINE HYDROCHLORIDE 40 MG/1
40 TABLET, FILM COATED ORAL DAILY
Qty: 30 TABLET | Refills: 3 | Status: SHIPPED | OUTPATIENT
Start: 2020-10-16

## 2020-10-16 RX ORDER — CETIRIZINE HYDROCHLORIDE 10 MG/1
10 TABLET ORAL DAILY
Qty: 30 TABLET | Refills: 2 | Status: SHIPPED | OUTPATIENT
Start: 2020-10-16

## 2020-10-16 RX ORDER — DOXYCYCLINE HYCLATE 100 MG
100 TABLET ORAL 2 TIMES DAILY
Qty: 20 TABLET | Refills: 0 | Status: SHIPPED | OUTPATIENT
Start: 2020-10-16 | End: 2020-10-26

## 2020-10-16 RX ORDER — IPRATROPIUM BROMIDE AND ALBUTEROL SULFATE 2.5; .5 MG/3ML; MG/3ML
3 SOLUTION RESPIRATORY (INHALATION) 4 TIMES DAILY
Qty: 360 ML | Refills: 2 | Status: SHIPPED | OUTPATIENT
Start: 2020-10-16

## 2020-10-16 RX ORDER — ACETYLCYSTEINE 200 MG/ML
3 SOLUTION ORAL; RESPIRATORY (INHALATION) 3 TIMES DAILY
Qty: 30 ML | Refills: 3 | Status: ON HOLD | OUTPATIENT
Start: 2020-10-16 | End: 2021-02-24

## 2020-10-16 RX ORDER — POTASSIUM CHLORIDE 20 MEQ/1
20 TABLET, EXTENDED RELEASE ORAL 2 TIMES DAILY
Qty: 60 TABLET | Refills: 3 | Status: CANCELLED | OUTPATIENT
Start: 2020-10-16

## 2020-10-16 RX ORDER — ALENDRONATE SODIUM 70 MG/1
70 TABLET ORAL
Qty: 12 TABLET | Refills: 1 | Status: SHIPPED | OUTPATIENT
Start: 2020-10-16

## 2020-10-16 RX ORDER — AMOXICILLIN AND CLAVULANATE POTASSIUM 562.5; 437.5; 62.5 MG/1; MG/1; MG/1
1 TABLET, FILM COATED, EXTENDED RELEASE ORAL 2 TIMES DAILY
Qty: 20 TABLET | Refills: 0 | Status: SHIPPED | OUTPATIENT
Start: 2020-10-16 | End: 2020-10-26

## 2020-10-16 RX ORDER — FLUTICASONE PROPIONATE 44 UG/1
1 AEROSOL, METERED RESPIRATORY (INHALATION) 2 TIMES DAILY
Qty: 1 INHALER | Refills: 3 | Status: CANCELLED | OUTPATIENT
Start: 2020-10-16

## 2020-10-16 RX ORDER — LIDOCAINE 4 G/G
1 PATCH TOPICAL DAILY
Qty: 15 PATCH | Refills: 0 | Status: SHIPPED | OUTPATIENT
Start: 2020-10-16

## 2020-10-16 RX ORDER — MELATONIN
1000 4 TIMES DAILY
Qty: 90 TABLET | Refills: 1 | Status: ON HOLD | OUTPATIENT
Start: 2020-10-16 | End: 2021-02-24

## 2020-10-16 RX ORDER — ANTACID TABLETS 648 MG/1
1 TABLET, CHEWABLE ORAL 2 TIMES DAILY
Qty: 60 TABLET | Refills: 11 | Status: ON HOLD | OUTPATIENT
Start: 2020-10-16 | End: 2021-02-24

## 2020-10-16 RX ORDER — FLUTICASONE PROPIONATE 44 UG/1
1 AEROSOL, METERED RESPIRATORY (INHALATION) 2 TIMES DAILY
Status: DISCONTINUED | OUTPATIENT
Start: 2020-10-16 | End: 2020-10-16 | Stop reason: HOSPADM

## 2020-10-16 RX ADMIN — POTASSIUM CHLORIDE 20 MEQ: 20 TABLET, EXTENDED RELEASE ORAL at 08:58

## 2020-10-16 RX ADMIN — PAROXETINE HYDROCHLORIDE 40 MG: 20 TABLET, FILM COATED ORAL at 08:57

## 2020-10-16 RX ADMIN — OXYCODONE HYDROCHLORIDE 10 MG: 10 TABLET, FILM COATED, EXTENDED RELEASE ORAL at 08:58

## 2020-10-16 RX ADMIN — DIBASIC SODIUM PHOSPHATE, MONOBASIC POTASSIUM PHOSPHATE AND MONOBASIC SODIUM PHOSPHATE 2 TABLET: 852; 155; 130 TABLET ORAL at 08:57

## 2020-10-16 RX ADMIN — ACETYLCYSTEINE 600 MG: 200 SOLUTION ORAL; RESPIRATORY (INHALATION) at 12:00

## 2020-10-16 RX ADMIN — IPRATROPIUM BROMIDE AND ALBUTEROL SULFATE 1 AMPULE: .5; 3 SOLUTION RESPIRATORY (INHALATION) at 15:46

## 2020-10-16 RX ADMIN — CEFTRIAXONE SODIUM 1 G: 1 INJECTION, POWDER, FOR SOLUTION INTRAMUSCULAR; INTRAVENOUS at 08:57

## 2020-10-16 RX ADMIN — Medication 1 PUFF: at 12:10

## 2020-10-16 RX ADMIN — POLYETHYLENE GLYCOL 3350 17 G: 17 POWDER, FOR SOLUTION ORAL at 08:59

## 2020-10-16 RX ADMIN — LEVOTHYROXINE SODIUM 50 MCG: 0.05 TABLET ORAL at 08:58

## 2020-10-16 RX ADMIN — BUSPIRONE HYDROCHLORIDE 5 MG: 5 TABLET ORAL at 08:57

## 2020-10-16 RX ADMIN — ACETYLCYSTEINE 600 MG: 200 SOLUTION ORAL; RESPIRATORY (INHALATION) at 08:24

## 2020-10-16 RX ADMIN — CETIRIZINE HYDROCHLORIDE 10 MG: 10 TABLET, FILM COATED ORAL at 08:58

## 2020-10-16 RX ADMIN — IPRATROPIUM BROMIDE AND ALBUTEROL SULFATE 1 AMPULE: .5; 3 SOLUTION RESPIRATORY (INHALATION) at 08:24

## 2020-10-16 RX ADMIN — IPRATROPIUM BROMIDE AND ALBUTEROL SULFATE 1 AMPULE: .5; 3 SOLUTION RESPIRATORY (INHALATION) at 11:58

## 2020-10-16 RX ADMIN — Medication 250 MG: at 08:59

## 2020-10-16 RX ADMIN — Medication 10 ML: at 08:59

## 2020-10-16 RX ADMIN — ENOXAPARIN SODIUM 40 MG: 40 INJECTION SUBCUTANEOUS at 09:01

## 2020-10-16 RX ADMIN — DOCUSATE SODIUM 50 MG AND SENNOSIDES 8.6 MG 2 TABLET: 8.6; 5 TABLET, FILM COATED ORAL at 08:58

## 2020-10-16 RX ADMIN — PANTOPRAZOLE SODIUM 40 MG: 40 INJECTION, POWDER, FOR SOLUTION INTRAVENOUS at 08:57

## 2020-10-16 RX ADMIN — DOXYCYCLINE 100 MG: 100 INJECTION, POWDER, LYOPHILIZED, FOR SOLUTION INTRAVENOUS at 09:18

## 2020-10-16 RX ADMIN — MAGNESIUM HYDROXIDE 30 ML: 2400 SUSPENSION ORAL at 08:57

## 2020-10-16 ASSESSMENT — PAIN DESCRIPTION - ORIENTATION
ORIENTATION: LEFT
ORIENTATION: LEFT

## 2020-10-16 ASSESSMENT — PAIN SCALES - GENERAL: PAINLEVEL_OUTOF10: 5

## 2020-10-16 ASSESSMENT — PAIN DESCRIPTION - PAIN TYPE
TYPE: SURGICAL PAIN
TYPE: SURGICAL PAIN

## 2020-10-16 ASSESSMENT — PAIN SCALES - WONG BAKER
WONGBAKER_NUMERICALRESPONSE: 8
WONGBAKER_NUMERICALRESPONSE: 8

## 2020-10-16 ASSESSMENT — PAIN DESCRIPTION - LOCATION
LOCATION: HIP;GROIN
LOCATION: HIP;GROIN

## 2020-10-16 NOTE — DISCHARGE SUMMARY
NS     On the floor, ceftriaxone and fluids continued.      Surgery on 10/12/2020  LEFT HIP HEMIARTHROPLASTY FOR FEMORAL NECK [address]     Surgery went well and patient had no complaints following surgery. CK trending down on discharge. Pain controlled with lidocaine patches, ice, and Oxy 10 XL daily. Will giver her a week's worth on discharge to rehab. Bowel Regimen of Senna, Miralax, and Milk of Mag will be continued on discharge.      Pt WBCs increased after surgery and she had a new oxygen requirement, and elevated pro-calcitonin. Doxycycline added to abx regimen.   CXR 10/14/2020 showed: Increased bibasilar airspace disease, either atelectasis or pneumonia. WBCs trending down on discharge.     Pt continued to wheeze after surgery, so she was placed on duonebs q4, mucomyst q3, and acapella. Pt has no history of prior lung disease, but was placed on Azithromycin prior to hospital stay for wheezing. She briefly had a controller inhaler in 2018. She was placed on Flovent for continued wheezing and increased oxygen need.     She was tried on 2L of O2, in hope of weaning off oxygen. However, she continued to de-stat until placed on 3-3.5 L. Therefore, she will be transferred to rehab with oxygen, in hope of weaning after her pneumonia resolves. She will need to continue her 10 day course of antibiotic therapy and follow up with PCP. Physical Exam Performed:     BP (!) 153/75   Pulse 90   Temp 99 °F (37.2 °C) (Oral)   Resp 16   Ht 5' 7\" (1.702 m)   Wt 159 lb (72.1 kg)   SpO2 94%   BMI 24.90 kg/m²       General appearance: Pt is lying in bed with O2 on, responding appropriately to questions     HEENT: Pupils equal, round, and reactive to light. Conjunctivae/corneas clear. Neck: Supple, with full range of motion. Trachea midline. Respiratory:  Normal respiratory effort. Clear to auscultation, bilaterally without Rales/Wheezes/Rhonchi. No wheezing today, which is improved.    Cardiovascular: Regular rate and rhythm with normal S1/S2 without murmurs, rubs or gallops. Abdomen: Soft, non-tender, non-distended with normal bowel sounds. Musculoskeletal: No clubbing, cyanosis or edema bilaterally. Full range of motion without deformity. Skin: Skin color, texture, turgor normal.  No rashes or lesions. Surgical Wound bandaged    Neurologic:  Neurovascularly intact without any focal sensory/motor deficits. Psychiatric: Alert and oriented, thought content appropriate, normal insight      Labs: For convenience and continuity at follow-up the following most recent labs are provided:      CBC:    Lab Results   Component Value Date    WBC 11.2 10/16/2020    HGB 8.3 10/16/2020    HCT 24.7 10/16/2020     10/16/2020       Renal:    Lab Results   Component Value Date     10/16/2020    K 4.4 10/16/2020     10/16/2020    CO2 26 10/16/2020    BUN 10 10/16/2020    CREATININE <0.5 10/16/2020    CALCIUM 8.1 10/16/2020    PHOS 2.3 10/16/2020       Significant Diagnostic Studies    Radiology:   XR CHEST PORTABLE   Final Result   Increased bibasilar airspace disease, either atelectasis or pneumonia. XR PELVIS (1-2 VIEWS)   Final Result   No unexpected findings following left hip arthroplasty. XR HIP LEFT (1 VIEW)   Final Result   No acute osseous findings of the pelvis or left hip following partial left   hip arthroplasty. CT PELVIS WO CONTRAST Additional Contrast? None   Final Result   Fracture of the left femoral neck as described above. Mild degenerative change about the left hip. CT Head WO Contrast   Final Result   No acute intracranial abnormality visualized. CT Cervical Spine WO Contrast   Final Result   1. No acute abnormality of the cervical spine. 2. Multilevel degenerative change of the cervical spine. 3. Partially imaged thyroid is enlarged and heterogeneous. Recommend   nonemergent dedicated follow-up thyroid ultrasound as per below. RECOMMENDATIONS:   Managing Incidental Thyroid Nodule Detected at CT or MRI or US1. Further evaluation by thyroid Ultrasound recommended for these incidental   nodules:      ~Age 18 years or less - Any nodule. ~Age 21-27 years old - Nodule 1 cm in size or greater. ~Age 35 years or more - Nodule 1.5 cm in size or greater2. Follow up thyroid ultrasound also recommend in these scenarios:      ~Solitary nodule with high risk imaging features (locally invasive nodule   or suspicious lymph nodes). ~Any nodule in a heterogeneous enlarged thyroid gland. NO further imaging is recommended in the following scenarios:      ~No f/u imaging is recommended for ITNs not meeting the above criteria.      ~No US or f/u recommended for ITNs without high risk features or with   limited life expectancy or significant co-morbidities, unless clinically   warranted. Note: These recommendations do not apply if increased risk for thyroid cancer   or symptomatic thyroid disease. Recommendations for f/u of Incidental Thyroid Nodules (ITN) found on CT, MR,   NM and Extrathyroidal US are based upon the ACR white paper and [facility] 3-tiered   system for managing ITNs:J Am Deja Radiol. 2015 Feb;12(2): 143-50         XR HIP 2-3 VW W PELVIS RIGHT   Final Result   No acute posttraumatic abnormality detected. Chronic appearing fracture deformity of the left hip. XR CHEST PORTABLE   Final Result   No acute posttraumatic abnormality detected. Chronic appearing fracture deformity of the left hip.                 Consults:     IP CONSULT TO ORTHOPEDIC SURGERY  IP CONSULT TO HOSPITALIST  IP CONSULT TO CASE MANAGEMENT  IP CONSULT TO ORTHOPEDIC SURGERY  IP CONSULT TO SOCIAL WORK  IP CONSULT TO SPIRITUAL SERVICES    Disposition:  Rehab at [facility]    Condition at Discharge: Stable    Discharge Instructions/Follow-up:    PCP   - Continue antibiotics for pneumonia  - Continue duonebs, flovent, mucomyst and PARoxetine (PAXIL) 40 MG tablet Take 1 tablet by mouth daily  Qty: 30 tablet, Refills: 3      sennosides-docusate sodium (SENOKOT-S) 8.6-50 MG tablet Take 1 tablet by mouth 2 times daily  Qty: 60 tablet, Refills: 1      polyethylene glycol (GLYCOLAX) 17 g packet Take 17 g by mouth daily  Qty: 527 g, Refills: 1      enoxaparin (LOVENOX) 40 MG/0.4ML injection Inject 0.4 mLs into the skin daily for 20 days  Qty: 8 mL, Refills: 0              Details   bisacodyl (DULCOLAX) 10 MG suppository Place 10 mg rectally daily as needed      ASPIRIN 81 PO Take 81 mg by mouth daily      omeprazole (PRILOSEC) 20 MG delayed release capsule Take 1 capsule by mouth daily      levothyroxine (SYNTHROID) 50 MCG tablet Take 1 tablet by mouth daily      busPIRone (BUSPAR) 5 MG tablet Take 1 tablet by mouth daily              Time Spent on discharge is more than 20 minutes in the examination, evaluation, counseling and review of medications and discharge plan. This patient was seen and evaluated with the resident team and with the attending, Dr. Tyler Orantes. Signed:    Judy Kendall DO   10/16/2020      Thank you Lorin Guzman for the opportunity to be involved in this patient's care. If you have any questions or concerns please feel free to contact me at [phone].

## 2020-10-16 NOTE — PROGRESS NOTES
Inpatient Family Medicine Progress Note      PCP: Lise Mcknight    Date of Admission: 10/11/2020    Chief Complaint:  Fall w/ Left Femoral Neck Fracture s/p [address] Course:      81 y. o. female with PMH significant for Dementia, Depression and Hypothyroidism, who presented to [facility] 10/11/2020 with a fall which believes occurred around noon.  Pt states she had a BM on the toilet and then woke up in the floor. Wesmarleny Keyonna is unsure if she hit her head, but noted that her right hip was hurting and she could not get up. EMS found her awake lying in the bathroom floor and brought her to ED.     In the ED:  CT of pelvis showed comminuted left femoral neck fracture  CT head showed no intracranial abnormality  CT neck no acute cervical spine abnormality  CXR showed no acute posttraumatic abnormality     EKG: NSR, 88 bpm, Prolonged QT, Nonspecific T wave abnormality, no prior EKG for comparison     UA: +Nitrites, +Leukocyte esterase, +Blood, +Protein, WBC , RBC 11-20, bacteria 4+     WBC: 16.4  Total CK: 2872  BUN: 32  Cr: 0.5  Troponin: < 0.01     Rec'd Dose of ceftriaxone, 1000 ml of NS     On the floor, ceftriaxone and fluids continued.      Surgery on 10/12/2020  LEFT HIP HEMIARTHROPLASTY FOR FEMORAL NECK FRACTURE      Surgery went well and patient had no complaints following surgery.     Pt WBCs increased, pt has new oxygen requirement, and elevated pro-calcitonin. Doxycycline added to abx regimen. CXR 10/14/2020 showed: Increased bibasilar airspace disease, either atelectasis or pneumonia. Pt continued to wheeze, so she was placed on duonebs q4, mucomyst q3, and acapella. Pt has no history of prior lung disease, but was placed on Azithromycin prior to hospital stay for wheezing. She briefly had a controller inhaler in 2018. She was placed on Flovent for continued wheezing and increased oxygen need. She was tried on 2L of O2, in hope of weaning off oxygen.  However, she continued to de-stat until placed on 3-3.5 L. Therefore, she will be transferred to rehab with oxygen, in hope of weaning after her pneumonia resolves. Subjective:     Pt states that her pain is a 5/10 today. She denies complaints of cough, wheezing, chest pain, or SOB. She feels ready to be transferred to rehab. Medications:  Reviewed    Infusion Medications    sodium chloride 125 mL/hr at 10/14/20 2056     Scheduled Medications    fluticasone  1 puff Inhalation BID    oyster shell calcium/vitamin D  1 tablet Oral Daily    acetylcysteine  3 mL Inhalation TID    magnesium hydroxide  30 mL Oral Daily    cetirizine  10 mg Oral Daily    phosphorus  500 mg Oral 4x Daily    doxycycline (VIBRAMYCIN) IV  100 mg Intravenous Q12H    cefTRIAXone (ROCEPHIN) IV  1 g Intravenous Q24H    sennosides-docusate sodium  2 tablet Oral BID    oxyCODONE  10 mg Oral Daily    ipratropium-albuterol  1 ampule Inhalation 4x daily    potassium chloride  20 mEq Oral BID    busPIRone  5 mg Oral Daily    levothyroxine  50 mcg Oral Daily    PARoxetine  40 mg Oral Daily    pantoprazole  40 mg Intravenous Daily    polyethylene glycol  17 g Oral BID    melatonin ER  1 mg Oral QPM    sodium chloride flush  10 mL Intravenous 2 times per day    enoxaparin  40 mg Subcutaneous Daily    lidocaine  1 patch Transdermal Daily     PRN Meds: promethazine **OR** [DISCONTINUED] ondansetron, morphine **OR** morphine, bisacodyl, sodium chloride flush, oxyCODONE **OR** oxyCODONE    No intake or output data in the 24 hours ending 10/16/20 0917    Physical Exam Performed:    BP (!) 153/75   Pulse 90   Temp 99 °F (37.2 °C) (Oral)   Resp 16   Ht 5' 7\" (1.702 m)   Wt 159 lb (72.1 kg)   SpO2 94%   BMI 24.90 kg/m²     General appearance: Pt is lying in bed with O2 on, responding appropriately to questions    HEENT: Pupils equal, round, and reactive to light. Conjunctivae/corneas clear. Neck: Supple, with full range of motion. Trachea midline. Respiratory:  Normal respiratory effort. Clear to auscultation, bilaterally without Rales/Wheezes/Rhonchi. Cardiovascular: Regular rate and rhythm with normal S1/S2 without murmurs, rubs or gallops. Abdomen: Soft, non-tender, non-distended with normal bowel sounds. Musculoskeletal: No clubbing, cyanosis or edema bilaterally. Full range of motion without deformity. Skin: Skin color, texture, turgor normal.  No rashes or lesions. Neurologic:  Neurovascularly intact without any focal sensory/motor deficits. Psychiatric: Alert and oriented, thought content appropriate, normal insight      Labs:   Recent Labs     10/14/20  0641 10/15/20  0620 10/16/20  0647   WBC 13.1* 11.8* 11.2*   HGB 9.2* 8.9* 8.3*   HCT 27.9* 26.4* 24.7*    164 189     Recent Labs     10/14/20  0641 10/15/20  0620 10/16/20  0647    139 136   K 4.2 4.0 4.4    107 101   CO2 24 25 26   BUN 15 13 10   CREATININE <0.5* <0.5* <0.5*   CALCIUM 8.0* 7.9* 8.1*   PHOS 1.8* 2.2* 2.3*     Recent Labs     10/14/20  0641 10/15/20  0620 10/16/20  0647   AST 58* 40* 37   ALT 44* 35 34   BILITOT 0.8 0.6 0.6   ALKPHOS 116 117 131*     No results for input(s): INR in the last 72 hours. Recent Labs     10/14/20  0641 10/15/20  0620 10/16/20  0647   CKTOTAL 641* 372* 305*       Urinalysis:      Lab Results   Component Value Date    NITRU POSITIVE 10/11/2020    WBCUA  10/11/2020    BACTERIA 4+ 10/11/2020    RBCUA 11-20 10/11/2020    BLOODU MODERATE 10/11/2020    SPECGRAV >=1.030 10/11/2020    GLUCOSEU Negative 10/11/2020       Radiology:  XR CHEST PORTABLE   Final Result   Increased bibasilar airspace disease, either atelectasis or pneumonia. XR PELVIS (1-2 VIEWS)   Final Result   No unexpected findings following left hip arthroplasty. XR HIP LEFT (1 VIEW)   Final Result   No acute osseous findings of the pelvis or left hip following partial left   hip arthroplasty.          CT PELVIS WO CONTRAST Additional Contrast? None   Final Result   Fracture of the left femoral neck as described above. Mild degenerative change about the left hip. CT Head WO Contrast   Final Result   No acute intracranial abnormality visualized. CT Cervical Spine WO Contrast   Final Result   1. No acute abnormality of the cervical spine. 2. Multilevel degenerative change of the cervical spine. 3. Partially imaged thyroid is enlarged and heterogeneous. Recommend   nonemergent dedicated follow-up thyroid ultrasound as per below. RECOMMENDATIONS:   Managing Incidental Thyroid Nodule Detected at CT or MRI or US1. Further evaluation by thyroid Ultrasound recommended for these incidental   nodules:      ~Age 18 years or less - Any nodule. ~Age 21-27 years old - Nodule 1 cm in size or greater. ~Age 35 years or more - Nodule 1.5 cm in size or greater2. Follow up thyroid ultrasound also recommend in these scenarios:      ~Solitary nodule with high risk imaging features (locally invasive nodule   or suspicious lymph nodes). ~Any nodule in a heterogeneous enlarged thyroid gland. NO further imaging is recommended in the following scenarios:      ~No f/u imaging is recommended for ITNs not meeting the above criteria.      ~No US or f/u recommended for ITNs without high risk features or with   limited life expectancy or significant co-morbidities, unless clinically   warranted. Note: These recommendations do not apply if increased risk for thyroid cancer   or symptomatic thyroid disease. Recommendations for f/u of Incidental Thyroid Nodules (ITN) found on CT, MR,   NM and Extrathyroidal US are based upon the ACR white paper and [facility] 3-tiered   system for managing ITNs:J Am Deja Radiol. 2015 Feb;12(2): 143-50         XR HIP 2-3 VW W PELVIS RIGHT   Final Result   No acute posttraumatic abnormality detected. Chronic appearing fracture deformity of the left hip.          XR CHEST PORTABLE   Final Result   No acute posttraumatic abnormality detected. Chronic appearing fracture deformity of the left hip. Assessment/Plan:    Active Hospital Problems    Diagnosis Date Noted    Pneumonia [J18.9] 10/16/2020    Wheezing [R06.2] 10/16/2020    Requires supplemental oxygen [Z99.81] 10/16/2020    Elevated LFTs [R79.89] 10/13/2020    Closed displaced fracture of left femoral neck with malunion [S72.002P] 10/12/2020    UTI (urinary tract infection) [N39.0] 10/12/2020    Leukocytosis [D72.829] 10/12/2020    Elevated CK [R74.8] 10/12/2020    Fall [W19. XXXA] 10/12/2020    Prolonged QT interval [R94.31] 10/12/2020    Osteoarthritis [M19.90] 06/30/2014     PLAN:     Left femoral neck fracture   -CT of pelvis showed comminuted left femoral neck fracture  - Surgery 10/12/2020     Plan of care per Ortho  - Status post left hip hemiarthroplasty  - Okay for 50% weightbearing left lower extremity with posterior hip precautions  - Pain control, postop antibiotics  - Bowel Regimen, added Milk of Magnesia d/t constipation  + BM  - PT/OT  - Ready for Rehab today     > Tylenol stopped due to elevated LFTs  - VIT D Supplement with Phosphate, Calcium supplement   > Bisphosphanate OP, consider DEXA  > Low protein, add supplementation to meals  > Pain: Oxy 10 mg XL; 7-10 days     Pneumonia   - WBCs improving, Pro-calcitonin trending down   - CXR shows changes consistent with atelectasis vs. Pneumonia  - Crackles in LLL on physical exam   - Duonebs and Incentive Spirometry  - Continue Doxycycline BID  - Continue Mucomyst and Acapella    Wheezing  - PCP treated OP with Azithromycin  - Wheezing improved with Resp Care in hospital  - Add Flovent inhaler     Fall 2/2 to vasovagal vs UTI   - Low risk for arrhythmia/ cardiac cause  - - Sl Increased Qtc, Troponin <0.01  - - TSH wnl  -CT head showed no intracranial abnormality  -CT neck no acute cervical spine abnormality  -Monitor tele     UTI  -UA:

## 2020-10-16 NOTE — PROGRESS NOTES
Physical Therapy  Facility/Department: [facility] C5 - MED SURG/ORTHO  Daily Treatment Note  NAME: Citlalli Santos  : 1935  MRN: [id number]    Date of Service: 10/16/2020    Discharge Recommendations:  Subacute/Skilled Nursing Facility   PT Equipment Recommendations  Equipment Needed: No    Assessment   Assessment: Therapy progression was limited by diarrhea and extensive pericare. Presented with poor trunk and LE control during bed mobility with the HOB elevated and use of the handrails to get EOB. Requiring assistance and sequencing cues throughout. Transfers require lift equipment. Treatment Diagnosis: Reduced functional mobility  Prognosis: Fair  Decision Making: Low Complexity  PT Education: Goals;PT Role;Plan of Care;Home Exercise Program;Precautions;Transfer Training; Adaptive Device Training;Weight-bearing Education;Equipment;General Safety; Disease Specific Education; Functional Mobility Training  Patient Education: Disease Specific Education: pt educated on current ROM/WB restrictions. pt verbalized understanding but will require reinforcement. REQUIRES PT FOLLOW UP: Yes     Patient Diagnosis(es): The primary encounter diagnosis was Syncope and collapse. Diagnoses of Hip pain, Closed fracture of neck of left femur, initial encounter [facility]), Traumatic rhabdomyolysis, initial encounter ([facility] and Urinary tract infection without hematuria, site unspecified were also pertinent to this visit. has a past medical history of Thyroid disease. has a past surgical history that includes Total Thyroidectomy and hip surgery (Left, 10/12/2020).     Restrictions  Restrictions/Precautions  Restrictions/Precautions: Weight Bearing, ROM Restrictions, General Precautions, Fall Risk  Lower Extremity Weight Bearing Restrictions  Left Lower Extremity Weight Bearing: Partial Weight Bearing  Partial Weight Bearing Percentage Or Pounds: 50%  Position Activity Restriction  Hip Precautions: No hip flexion > 90 degrees, No Endurance Training, IADL Training, Pain Management, Equipment Evaluation, Education, & procurement, Safety Education & Training, Home Exercise Program  Safety Devices  Type of devices:  All fall risk precautions in place, Left in chair, Call light within reach, Chair alarm in place, Nurse notified, Gait belt, Patient at risk for falls     Therapy Time   Individual Concurrent Group Co-treatment   Time In       1037   Time Out       1120   Minutes       43   Timed Code Treatment Minutes: [address], [facility]

## 2020-10-16 NOTE — PROGRESS NOTES
Occupational Therapy  Facility/Department: [facility] C5 - MED SURG/ORTHO  Daily Treatment Note  NAME: Kymberly Fernandez  : 1935  MRN: [id number]    Date of Service: 10/16/2020    Discharge Recommendations:  Subacute/Skilled Nursing Facility       Assessment   Performance deficits / Impairments: Decreased functional mobility ; Decreased endurance;Decreased ADL status; Decreased balance;Decreased strength;Decreased cognition;Decreased high-level IADLs;Decreased posture  Assessment: Pt completed rolling bed level due to loose stool x3 to both R and L. Pt demos progress with sitting balance, requiring only CGA this date. Pt also demos progress with control from stand to sit, requiring mod A from [facility]. Pt would benefit from continued skilled OT to address the above noted occupational performane deficits. Prognosis: Good  OT Education: OT Role;Plan of Care;Orientation;Precautions; ADL Adaptive Strategies;Transfer Training;Energy Conservation  Patient Education: disease specific education: posterior hip precautions, wb status, role of OT  Barriers to Learning: cognition  REQUIRES OT FOLLOW UP: Yes  Activity Tolerance  Activity Tolerance: Patient Tolerated treatment well  Safety Devices  Safety Devices in place: Yes  Type of devices: All fall risk precautions in place; Left in chair;Call light within reach;Nurse notified; Patient at risk for falls; Chair alarm in place         Patient Diagnosis(es): The primary encounter diagnosis was Syncope and collapse. Diagnoses of Hip pain, Closed fracture of neck of left femur, initial encounter [facility]), Traumatic rhabdomyolysis, [facility] and Urinary tract infection without hematuria, site unspecified were also pertinent to this visit. has a past medical history of Thyroid disease. has a past surgical history that includes Total Thyroidectomy and hip surgery (Left, 10/12/2020).     Restrictions  Restrictions/Precautions  Restrictions/Precautions: Weight Bearing, ROM Restrictions, General Precautions, Fall Risk  Lower Extremity Weight Bearing Restrictions  Left Lower Extremity Weight Bearing: Partial Weight Bearing  Partial Weight Bearing Percentage Or Pounds: 50%  Position Activity Restriction  Hip Precautions: No hip flexion > 90 degrees, No ADduction, No hip internal rotation, Posterior hip precautions  Other position/activity restrictions: posterior precautions     Subjective   General  Chart Reviewed: Yes, Orders, Progress Notes, History and Physical, Imaging, Operative Notes  Patient assessed for rehabilitation services?: Yes  Response to previous treatment: Patient with no complaints from previous session  Family / Caregiver Present: No  Referring Practitioner: Sandeep Merrill MD  Diagnosis: Fall s/p L peg-arthroplasty    Subjective  Subjective: Pt resting in bed, pt with brief and purewick however brief full of liquid stool and urine.     Pain Assessment  Pain Assessment: Faces  Cohen-Baker Pain Rating: Hurts whole lot  Pain Type: Surgical pain  Pain Location: Hip;Groin  Pain Orientation: Left  Non-Pharmaceutical Pain Intervention(s): Repositioned  Vital Signs  Patient Currently in Pain: Yes     Orientation  Orientation  Orientation Level: Oriented to person;Oriented to place;Oriented to situation     Objective    ADL  Toileting: Dependent/Total(pt incontinent of urine and stool, total A for hygiene and brief change)     Balance  Sitting Balance: Contact guard assistance(EOB)  Standing Balance: Dependent/Total(initially max A of 2 on Alyssia STEDY, progressed to min-mod A of 1 on Alyssia STEDY)  Standing Balance  Activity: bed to chair with use of Amarilis Goldberg, 45 sec static stand on Alyssia STEDY    Bed mobility  Rolling to Left: Maximum assistance  Rolling to Right: Maximum assistance  Supine to Sit: Dependent/Total;2 Person assistance(max A of 2 to pt R)     Transfers  Sit to stand: Dependent/Total;2 Person assistance(max A of 2 using Amarilis Goldberg)  Stand to sit: Moderate assistance(from Sirisha Redd)     Cognition  Overall Cognitive Status: Exceptions  Arousal/Alertness: Delayed responses to stimuli  Following Commands: Follows one step commands with increased time; Follows one step commands with repetition  Memory: Decreased recall of precautions     Plan   Plan  Times per week: 4-6x  Times per day: Daily  Current Treatment Recommendations: Patient/Caregiver Education & Training, Balance Training, Functional Mobility Training, Endurance Training, Safety Education & Training, Self-Care / ADL, Pain Management, Positioning, Strengthening, Home Management Training      AM-PAC Score  AM-PAC Inpatient Daily Activity Raw Score: 12 (10/16/20 1157)  AM-PAC Inpatient ADL T-Scale Score : 30.6 (10/16/20 1157)  ADL Inpatient CMS 0-100% Score: 66.57 (10/16/20 1157)  ADL Inpatient CMS G-Code Modifier : CL (10/16/20 1157)    Goals  Short term goals  Time Frame for Short term goals: by 1 week (10/20/20)  Short term goal 1: Complete toilet transfers with mod A x1-- ongoing 10/16/20  Short term goal 2: Tolerate x3 mins of functional standing in order to increase independence for LB ADLs-- ongoing 10/16/20  Short term goal 3: Complete Lb dressing with max with use of AE as needed-- ongoing 10/16/20  Short term goal 4: Pt will verbalize understanding of posterior hip precautions in order to increase safety and independence with ADLs-- ongoing 10/16/20       Therapy Time   Individual Concurrent Group Co-treatment   Time In [address] [place] [address] Road         Time Out 1125         Minutes [address] [facility], CHOI/L

## 2020-10-16 NOTE — DISCHARGE INSTR - COC
Continuity of Care Form    Patient Name: Citlalli Santos   :  1935  MRN:  [id number]    Admit date:  10/11/2020  Discharge date:  10/16/2020    Code Status Order: Full Code   Advance Directives:   Advance Care Flowsheet Documentation       Date/Time Healthcare Directive Type of Healthcare Directive Copy in [address] Agent's Name Healthcare Agent's Phone Number    10/12/20 1711  No, patient does not have an advance directive for healthcare treatment -- -- -- -- --    10/12/20 1643  No, patient does not have an advance directive for healthcare treatment -- -- -- -- --            Admitting Physician:  Kaden Pineda DO  PCP: Winsome Mcdaniel    Discharging Nurse: [facility] Unit/Room#: 4565/7978-16  Discharging Unit Phone Number: ***    Emergency Contact:   Extended Emergency Contact Information  Primary Emergency Contact: Jacky Davenport   [facility] [address] Phone: [phone]  Mobile Phone: [phone]  Relation: Child  Secondary Emergency Contact: Bindu Davenport   [facility] [address] Phone: [phone]  Mobile Phone: [phone]  Relation: Grandchild    Past Surgical History:  Past Surgical History:   Procedure Laterality Date    HIP SURGERY Left 10/12/2020    LEFT HIP HEMIARTHROPLASTY performed by Ko Ferguson MD at [place] [address]         Immunization History: There is no immunization history on file for this patient. Active Problems:  Patient Active Problem List   Diagnosis Code    Closed displaced fracture of left femoral neck with malunion S72.002P    UTI (urinary tract infection) N39.0    Leukocytosis D72.829    Osteoarthritis M19.90    Elevated CK R74.8    Fall W19. XXXA    Prolonged QT interval R94.31    Hypothyroidism E03.9    Hematoma T14. 8XXA    GERD (gastroesophageal reflux disease) K21.9    Depression F32.9    DDD (degenerative disc disease) [id number]    Colitis K52.9    Bell's palsy G51.0 Acute cystitis N30.00    AK (actinic keratosis) L57.0    Anxiety F41.9    Backache M54.9    Sternal fracture S22.20XA    Scalp laceration S01. 01XA    Pulmonary contusion S27.329A    Neck pain M54.2    Narrowing of intervertebral disc space M99.79    Laceration of great toe S91.113A    Elevated LFTs R79.89       Isolation/Infection:   Isolation            No Isolation          Patient Infection Status       Infection Onset Added Last Indicated Last Indicated By Review Planned Expiration Resolved Resolved By    None active    Resolved    COVID-19 Rule Out 10/11/20 10/11/20 10/11/20 COVID-19 (Ordered)   10/11/20 Rule-Out Test Resulted            Nurse Assessment:  Last Vital Signs: BP (!) 153/75   Pulse 90   Temp 99 °F (37.2 °C) (Oral)   Resp 16   Ht 5' 7\" (1.702 m)   Wt 159 lb (72.1 kg)   SpO2 94%   BMI 24.90 kg/m²     Last documented pain score (0-10 scale): Pain Level: 5  Last Weight:   Wt Readings from Last 1 Encounters:   10/12/20 159 lb (72.1 kg)     Mental Status:  disoriented    IV Access:  - None    Nursing Mobility/ADLs:  Walking   Assisted  Transfer  Assisted  Bathing  Assisted  Dressing  Assisted  Toileting  Assisted  Feeding  Assisted  Med Admin  Assisted  Med Delivery   whole    Wound Care Documentation and Therapy:        Elimination:  Continence: Bowel: No  Bladder: No  Urinary Catheter: None   Colostomy/Ileostomy/Ileal Conduit: No       Date of Last BM: 10/16/2020  No intake or output data in the 24 hours ending 10/16/20 0857  No intake/output data recorded.     Safety Concerns:     History of Falls (last 30 days) and At Risk for Falls    Impairments/Disabilities:      None    Nutrition Therapy:  Current Nutrition Therapy:   - Oral Diet:  General    Routes of Feeding: Oral  Liquids: No Restrictions  Daily Fluid Restriction: no  Last Modified Barium Swallow with Video (Video Swallowing Test): not done    Treatments at the Time of Hospital Discharge:   Respiratory Treatments: ***  Oxygen Therapy: is on oxygen at 3 L/min per nasal cannula. Ventilator:    - No ventilator support    Rehab Therapies: Physical Therapy and Occupational Therapy  Weight Bearing Status/Restrictions: Partial weight bearing (30-50%) only on leg left leg  Other Medical Equipment (for information only, NOT a DME order):  walker  Other Treatments: ***    Patient's personal belongings (please select all that are sent with patient):  None    RN SIGNATURE:  Electronically signed by Erlinda Mcdonald RN on 10/16/20 at 12:21 PM EDT    CASE MANAGEMENT/SOCIAL WORK SECTION    Inpatient Status Date: ***    Readmission Risk Assessment Score:  Readmission Risk              Risk of Unplanned Readmission:        16           Discharging to Facility/ . [address]   Skilled Nursing   [address]   [phone]      / signature: Electronically signed by Ashley Plascencia RN on 10/16/20 at 11:59 AM EDT    PHYSICIAN SECTION    Prognosis: Good    Condition at Discharge: Stable    Rehab Potential (if transferring to Rehab): Good    Recommended Labs or Other Treatments After Discharge:     PCP for wheezing and pneumonia  Ortho for wound check     Physician Certification: I certify the above information and transfer of Yesica Mendez  is necessary for the continuing treatment of the diagnosis listed and that she requires [facility] for less 30 days.      Update Admission H&P: No change in H&P    PHYSICIAN SIGNATURE:  Electronically signed by Bettie Bermudez DO on 10/16/20 at 2:24 PM EDT

## 2020-10-19 ENCOUNTER — CARE COORDINATION (OUTPATIENT)
Dept: CASE MANAGEMENT | Age: 85
End: 2020-10-19

## 2020-10-19 NOTE — CARE COORDINATION
Emailed Docia Dancer @ [email or web address] for patient updates.   Brandy Morelos BSN  Care Transition Nurse for ortho bundle  [phone]

## 2020-10-26 ENCOUNTER — CARE COORDINATION (OUTPATIENT)
Dept: CASE MANAGEMENT | Age: 85
End: 2020-10-26

## 2020-10-26 NOTE — CARE COORDINATION
Emailed [facility] @ [address] for patient updates.   Tova Nascimento BSN  Care Transition Nurse for ortho bundle  [phone]

## 2020-10-27 ENCOUNTER — TELEPHONE (OUTPATIENT)
Dept: ORTHOPEDIC SURGERY | Age: 85
End: 2020-10-27

## 2020-10-27 NOTE — TELEPHONE ENCOUNTER
I called and spoke with nurse at [facility] regarding scheduling patient for her PO appt with Dr. Charlotte Rene states Dr. Vijay Ambrose is seeing patient while she is at [facility].  ---DMD

## 2020-11-02 ENCOUNTER — CARE COORDINATION (OUTPATIENT)
Dept: CASE MANAGEMENT | Age: 85
End: 2020-11-02

## 2020-11-02 NOTE — CARE COORDINATION
Emailed Docia Dancer @ [email or web address] and left message for patient's nurse for follow up updates.       Brandy Morelos BSN  Care Transition Nurse for ortho bundle  [phone]

## 2020-11-09 ENCOUNTER — CARE COORDINATION (OUTPATIENT)
Dept: CASE MANAGEMENT | Age: 85
End: 2020-11-09

## 2020-11-09 NOTE — CARE COORDINATION
Emailed Nevaeh Golden @ [address] for patient updates.    Arleen Ho BSN  Care Transition Nurse for ortho bundle  [phone]

## 2020-11-11 PROBLEM — N39.0 UTI (URINARY TRACT INFECTION): Status: RESOLVED | Noted: 2020-10-12 | Resolved: 2020-11-11
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
## 2020-11-17 ENCOUNTER — CARE COORDINATION (OUTPATIENT)
Dept: CASE MANAGEMENT | Age: 85
End: 2020-11-17

## 2020-11-17 NOTE — CARE COORDINATION
Spoke with Sera @ [facility]. States patient has been discharged to [address] [place] [address] and no longer skilled at [facility], but still a patient there.   Julissa Vegas BSN  Care Transition Nurse for ortho bundle  [phone]

## 2020-11-20 NOTE — CONSULTS
Inpatient Consultation    Yesica Mendez (1935)  10/12/2020    Reason for Consult:  Hip fracture  Requesting Physician: Beronica BOOTHE    CHIEF COMPLAINT:  left hip pain    History Obtained From:  Patient, son, EMR    HISTORY OF PRESENT ILLNESS:                The patient is a 81 y.o. female who presents with above chief complaint. She sustained a fall after a syncopal event and could not walk after. She was brought to the hospital and found to have a left femoral neck fracture. No prior acute hip issues. She is somewhat confused as to the event itself. She was admitted to the hospital and initial medical evaluation did not demonstrate any acute cardiac or cerebrovascular events. Surgery was recommended for left hip hemiarthroplasty.     Past Medical History:        Diagnosis Date    Thyroid disease      Past Surgical History:        Procedure Laterality Date    TOTAL THYROIDECTOMY       Current Medications:   Current Facility-Administered Medications: busPIRone (BUSPAR) tablet 5 mg, 5 mg, Oral, Daily  levothyroxine (SYNTHROID) tablet 50 mcg, 50 mcg, Oral, Daily  PARoxetine (PAXIL) tablet 40 mg, 40 mg, Oral, Daily  sodium chloride flush 0.9 % injection 10 mL, 10 mL, Intravenous, 2 times per day  sodium chloride flush 0.9 % injection 10 mL, 10 mL, Intravenous, PRN  polyethylene glycol (GLYCOLAX) packet 17 g, 17 g, Oral, Daily PRN  promethazine (PHENERGAN) tablet 12.5 mg, 12.5 mg, Oral, Q6H PRN **OR** [DISCONTINUED] ondansetron (ZOFRAN) injection 4 mg, 4 mg, Intravenous, Q6H PRN  [START ON 10/13/2020] enoxaparin (LOVENOX) injection 40 mg, 40 mg, Subcutaneous, Daily  acetaminophen (TYLENOL) tablet 650 mg, 650 mg, Oral, Q4H PRN  morphine (PF) injection 2 mg, 2 mg, Intravenous, Q3H PRN **OR** morphine (PF) injection 4 mg, 4 mg, Intravenous, Q3H PRN  bisacodyl (DULCOLAX) suppository 10 mg, 10 mg, Rectal, Daily PRN  pantoprazole (PROTONIX) injection 40 mg, 40 mg, Intravenous, Daily  cefTRIAXone (ROCEPHIN) 1 g IVPB in 50 mL D5W minibag, 1 g, Intravenous, Q24H  albuterol (PROVENTIL) nebulizer solution 2.5 mg, 2.5 mg, Nebulization, Q4H PRN  polyethylene glycol (GLYCOLAX) packet 17 g, 17 g, Oral, BID  lactated ringers infusion, , Intravenous, Continuous  sodium chloride flush 0.9 % injection 10 mL, 10 mL, Intravenous, 2 times per day  sodium chloride flush 0.9 % injection 10 mL, 10 mL, Intravenous, PRN  0.9 % sodium chloride infusion, , Intravenous, Continuous  ceFAZolin (ANCEF) 2 g in dextrose 5 % 100 mL IVPB, 2 g, Intravenous, On Call to OR  melatonin ER tablet 1 mg, 1 mg, Oral, QPM  acetaminophen (TYLENOL) tablet 650 mg, 650 mg, Oral, Once  HYDROmorphone (DILAUDID) injection 0.25 mg, 0.25 mg, Intravenous, Q5 Min PRN  HYDROmorphone (DILAUDID) injection 0.5 mg, 0.5 mg, Intravenous, Q5 Min PRN  morphine (PF) injection 1 mg, 1 mg, Intravenous, Q5 Min PRN  morphine (PF) injection 2 mg, 2 mg, Intravenous, Q5 Min PRN  oxyCODONE-acetaminophen (PERCOCET) 5-325 MG per tablet 1 tablet, 1 tablet, Oral, PRN **OR** oxyCODONE-acetaminophen (PERCOCET) 5-325 MG per tablet 2 tablet, 2 tablet, Oral, PRN  ondansetron (ZOFRAN) injection 4 mg, 4 mg, Intravenous, Once PRN  meperidine (DEMEROL) injection 12.5 mg, 12.5 mg, Intravenous, Q5 Min PRN  lidocaine 4 % external patch 1 patch, 1 patch, Transdermal, Daily  0.9 % sodium chloride infusion, 1,000 mL, Intravenous, Continuous  Allergies:  No known allergies    Social History:   Non smoker    Family History:   History reviewed. No pertinent family history.     REVIEW OF SYSTEMS:    Positive for hip pain and otherwise all systems reviewed and negative    PHYSICAL EXAM:      Gen: NAD  Chest: non labored breathing, no wheezing  CV: regular rate, well perfused    MSK:   BUE and RLE no pain with ROM or palpation, NVI  LLE: pain with log roll/axial load  +DF/PF/EHL      DATA:      CBC with Differential:    Lab Results   Component Value Date    WBC 16.4 10/11/2020    RBC 4.00 10/11/2020 HGB 12.5 10/11/2020    HCT 37.1 10/11/2020     10/11/2020    MCV 92.7 10/11/2020    MCH 31.2 10/11/2020    MCHC 33.6 10/11/2020    RDW 14.0 10/11/2020    LYMPHOPCT 18.6 10/11/2020    MONOPCT 9.2 10/11/2020    BASOPCT 0.5 10/11/2020    MONOSABS 1.5 10/11/2020    LYMPHSABS 3.0 10/11/2020    EOSABS 0.1 10/11/2020    BASOSABS 0.1 10/11/2020     CMP:    Lab Results   Component Value Date     10/11/2020    K 4.0 10/11/2020     10/11/2020    CO2 22 10/11/2020    BUN 32 10/11/2020    CREATININE <0.5 10/11/2020    GFRAA >60 10/11/2020    AGRATIO 1.5 10/11/2020    LABGLOM >60 10/11/2020    GLUCOSE 142 10/11/2020    PROT 6.9 10/11/2020    CALCIUM 9.1 10/11/2020    BILITOT 0.8 10/11/2020    ALKPHOS 87 10/11/2020    AST 79 10/11/2020    ALT 36 10/11/2020     BMP:    Lab Results   Component Value Date     10/11/2020    K 4.0 10/11/2020     10/11/2020    CO2 22 10/11/2020    BUN 32 10/11/2020    CREATININE <0.5 10/11/2020    CALCIUM 9.1 10/11/2020    GFRAA >60 10/11/2020    LABGLOM >60 10/11/2020    GLUCOSE 142 10/11/2020       Radiology:   ONE XRAY VIEW OF THE CHEST; ONE XRAY VIEW OF THE PELVIS AND TWO XRAY VIEWS    RIGHT HIP         10/11/2020 7:38 pm; 10/11/2020 7:39 pm         COMPARISON:    None.         HISTORY:    ORDERING SYSTEM PROVIDED HISTORY: fall    TECHNOLOGIST PROVIDED HISTORY:    Reason for exam:->fall    Reason for Exam: Fall    Acuity: Acute    Type of Exam: Initial; ORDERING SYSTEM PROVIDED HISTORY: fall    TECHNOLOGIST PROVIDED HISTORY:    Reason for exam:->fall    Reason for Exam: Fall; right hip pain    Acuity: Acute    Type of Exam: Initial         FINDINGS:    Chest x-ray: No pneumothorax or pulmonary contusion or effusion is    identified.  The heart size is normal.         Pelvic x-ray with right hip: No acute fracture or hip dislocation is    identified.  There appears to be a chronic fracture deformity of the left    femoral neck.              Impression    No acute posttraumatic abnormality detected.         Chronic appearing fracture deformity of the left hip. Assessment: 81year old female with left femoral neck fracture    Plan:  1) Diagnosis discussed with patient and her son Luís Oleary. Plan for surgery today for left hip hemiarthroplasty   Risks and benefits of the procedure were fully explained in detail, including but not limited to instability, leg length discrepancy, infection, neurovascular injury, continued pain, arthritis, stiffness, need for further surgery, re-injury, DVT, PE, general risks of anesthesia and loss of limb or life. 2) n.p.o. at this time  3) pain control, bedrest, IV fluids  4) medical work-up and management appreciated    Chriss Heard MD  [address] partner of [facility]) good, to achieve stated therapy goals

## 2020-12-02 ENCOUNTER — CARE COORDINATION (OUTPATIENT)
Dept: CASE MANAGEMENT | Age: 85
End: 2020-12-02

## 2020-12-02 NOTE — CARE COORDINATION
Emailed Nader Esparza @ [email or web address] for patient updates. Danostormy Isaias replied: There has been no change.   She remains here [address] [facility] BSN  Care Transition Nurse for ortho bundle  [phone]

## 2020-12-15 ENCOUNTER — CARE COORDINATION (OUTPATIENT)
Dept: CASE MANAGEMENT | Age: 85
End: 2020-12-15

## 2020-12-15 NOTE — CARE COORDINATION
Emailed Amaury Torres @ [facility] for patient updates. Received message from Amaury Torres that no changes and patient is still LTC.   Denys Florentino BSN  Care Transition Nurse for ortho bundle  [phone]

## 2020-12-28 ENCOUNTER — CARE COORDINATION (OUTPATIENT)
Dept: CASE MANAGEMENT | Age: 85
End: 2020-12-28

## 2020-12-28 NOTE — CARE COORDINATION
Emailed Alverto Sweeney @ [email or web address] for patient updates.   Fabien García BSN  Care Transition Nurse for ortho bundle  [phone]

## 2021-02-05 ENCOUNTER — APPOINTMENT (OUTPATIENT)
Dept: CT IMAGING | Age: 86
End: 2021-02-05
Payer: MEDICARE

## 2021-02-05 ENCOUNTER — HOSPITAL ENCOUNTER (EMERGENCY)
Age: 86
Discharge: NURSING, REHAB OR LONG-TERM CARE | End: 2021-02-05
Attending: EMERGENCY MEDICINE
Payer: MEDICARE

## 2021-02-05 ENCOUNTER — APPOINTMENT (OUTPATIENT)
Dept: GENERAL RADIOLOGY | Age: 86
End: 2021-02-05
Payer: MEDICARE

## 2021-02-05 VITALS
DIASTOLIC BLOOD PRESSURE: 90 MMHG | SYSTOLIC BLOOD PRESSURE: 153 MMHG | WEIGHT: 166.4 LBS | OXYGEN SATURATION: 96 % | HEART RATE: 84 BPM | TEMPERATURE: 98.2 F | RESPIRATION RATE: 16 BRPM | BODY MASS INDEX: 26.06 KG/M2

## 2021-02-05 DIAGNOSIS — E07.89 THYROID MASS OF UNCLEAR ETIOLOGY: ICD-10-CM
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
## 2021-02-05 LAB
A/G RATIO: 1.4 (ref 1.1–2.2)
ALBUMIN SERPL-MCNC: 4.1 G/DL (ref 3.4–5)
ALP BLD-CCNC: 107 U/L (ref 40–129)
ALT SERPL-CCNC: 10 U/L (ref 10–40)
ANION GAP SERPL CALCULATED.3IONS-SCNC: 8 MMOL/L (ref 3–16)
AST SERPL-CCNC: 16 U/L (ref 15–37)
BASOPHILS ABSOLUTE: 0.1 K/UL (ref 0–0.2)
BASOPHILS RELATIVE PERCENT: 1.3 %
BILIRUB SERPL-MCNC: <0.2 MG/DL (ref 0–1)
BILIRUBIN URINE: NEGATIVE
BLOOD, URINE: NEGATIVE
BUN BLDV-MCNC: 12 MG/DL (ref 7–20)
CALCIUM SERPL-MCNC: 9.6 MG/DL (ref 8.3–10.6)
CHLORIDE BLD-SCNC: 104 MMOL/L (ref 99–110)
CLARITY: ABNORMAL
CO2: 27 MMOL/L (ref 21–32)
COLOR: YELLOW
CREAT SERPL-MCNC: <0.5 MG/DL (ref 0.6–1.2)
EKG ATRIAL RATE: 89 BPM
EKG DIAGNOSIS: NORMAL
EKG P-R INTERVAL: 168 MS
EKG Q-T INTERVAL: 394 MS
EKG QRS DURATION: 92 MS
EKG QTC CALCULATION (BAZETT): 479 MS
EKG R AXIS: -37 DEGREES
EKG T AXIS: 15 DEGREES
EKG VENTRICULAR RATE: 89 BPM
EOSINOPHILS ABSOLUTE: 0.2 K/UL (ref 0–0.6)
EOSINOPHILS RELATIVE PERCENT: 1.8 %
GFR AFRICAN AMERICAN: >60
GFR NON-AFRICAN AMERICAN: >60
GLOBULIN: 2.9 G/DL
GLUCOSE BLD-MCNC: 112 MG/DL (ref 70–99)
GLUCOSE URINE: NEGATIVE MG/DL
HCT VFR BLD CALC: 41.1 % (ref 36–48)
HEMOGLOBIN: 13.7 G/DL (ref 12–16)
KETONES, URINE: NEGATIVE MG/DL
LEUKOCYTE ESTERASE, URINE: NEGATIVE
LYMPHOCYTES ABSOLUTE: 4 K/UL (ref 1–5.1)
LYMPHOCYTES RELATIVE PERCENT: 41.2 %
MCH RBC QN AUTO: 30.1 PG (ref 26–34)
MCHC RBC AUTO-ENTMCNC: 33.2 G/DL (ref 31–36)
MCV RBC AUTO: 90.7 FL (ref 80–100)
MICROSCOPIC EXAMINATION: ABNORMAL
MONOCYTES ABSOLUTE: 1.1 K/UL (ref 0–1.3)
MONOCYTES RELATIVE PERCENT: 11.1 %
NEUTROPHILS ABSOLUTE: 4.3 K/UL (ref 1.7–7.7)
NEUTROPHILS RELATIVE PERCENT: 44.6 %
NITRITE, URINE: NEGATIVE
PDW BLD-RTO: 15 % (ref 12.4–15.4)
PH UA: 7 (ref 5–8)
PLATELET # BLD: 246 K/UL (ref 135–450)
PMV BLD AUTO: 9.1 FL (ref 5–10.5)
POTASSIUM REFLEX MAGNESIUM: 4.5 MMOL/L (ref 3.5–5.1)
PROTEIN UA: NEGATIVE MG/DL
RBC # BLD: 4.53 M/UL (ref 4–5.2)
SODIUM BLD-SCNC: 139 MMOL/L (ref 136–145)
SPECIFIC GRAVITY UA: 1.01 (ref 1–1.03)
TOTAL PROTEIN: 7 G/DL (ref 6.4–8.2)
TROPONIN: <0.01 NG/ML
URINE REFLEX TO CULTURE: ABNORMAL
URINE TYPE: ABNORMAL
UROBILINOGEN, URINE: 0.2 E.U./DL
WBC # BLD: 9.7 K/UL (ref 4–11)

## 2021-02-05 PROCEDURE — 72125 CT NECK SPINE W/O DYE: CPT

## 2021-02-05 PROCEDURE — 81003 URINALYSIS AUTO W/O SCOPE: CPT

## 2021-02-05 PROCEDURE — 70450 CT HEAD/BRAIN W/O DYE: CPT

## 2021-02-05 PROCEDURE — 80053 COMPREHEN METABOLIC PANEL: CPT

## 2021-02-05 PROCEDURE — 99284 EMERGENCY DEPT VISIT MOD MDM: CPT

## 2021-02-05 PROCEDURE — 93010 ELECTROCARDIOGRAM REPORT: CPT | Performed by: INTERNAL MEDICINE

## 2021-02-05 PROCEDURE — 84484 ASSAY OF TROPONIN QUANT: CPT

## 2021-02-05 PROCEDURE — 93005 ELECTROCARDIOGRAM TRACING: CPT | Performed by: EMERGENCY MEDICINE

## 2021-02-05 PROCEDURE — 71045 X-RAY EXAM CHEST 1 VIEW: CPT

## 2021-02-05 PROCEDURE — 85025 COMPLETE CBC W/AUTO DIFF WBC: CPT

## 2021-02-05 ASSESSMENT — PAIN SCALES - GENERAL: PAINLEVEL_OUTOF10: 3

## 2021-02-05 ASSESSMENT — PAIN DESCRIPTION - PAIN TYPE: TYPE: ACUTE PAIN

## 2021-02-05 NOTE — ED PROVIDER NOTES
[address] ED    CHIEF COMPLAINT  Fall (EMS states pt from [facility] for a fall yesterday, unsure what caused it, hit head on dresser, today pt was c/o blurry vision and nausea, upon arrival pt only complaint in neck pain)       HISTORY OF PRESENT ILLNESS  Bj Trejo is a 81 y.o. female with past medical history including dementia, depression, hypothyroidism who presents to the ED from [place] [id number] for evaluation for fall. The patient is unsure how she fell. States two falls - one yesterday and one day prior. By report, she struck her head on a dresser. Denies LOC. Complains of neck pain. Denies weakness, numbness. Denies chest pain. Complains of SOB, usual for her. EMS reported patient complaint of blurry vision and nausea per nursing staff - patient denies. Denies emesis, diarrhea, abdominal pain, dysuria. No other complaints, modifying factors or associated symptoms. I have reviewed the following from the nursing documentation:    Past Medical History:   Diagnosis Date    Thyroid disease      Past Surgical History:   Procedure Laterality Date    HIP SURGERY Left 10/12/2020    LEFT HIP HEMIARTHROPLASTY performed by Nolvia Recinos MD at [address] [place] [address]       History reviewed. No pertinent family history. Social History     Socioeconomic History    Marital status:       Spouse name: Not on file    Number of children: Not on file    Years of education: Not on file    Highest education level: Not on file   Occupational History    Not on file   Social Needs    Financial resource strain: Not on file    Food insecurity     Worry: Not on file     Inability: Not on file    Transportation needs     Medical: Not on file     Non-medical: Not on file   Tobacco Use    Smoking status: Never Smoker    Smokeless tobacco: Never Used   Substance and Sexual Activity    Alcohol use: Never     Frequency: Never    Drug use: Never    Sexual activity: Not on file   Lifestyle    Physical tablet 1    polyethylene glycol (GLYCOLAX) 17 g packet Take 17 g by mouth daily 527 g 1    ASPIRIN 81 PO Take 81 mg by mouth daily      bisacodyl (DULCOLAX) 10 MG suppository Place 10 mg rectally daily as needed      omeprazole (PRILOSEC) 20 MG delayed release capsule Take 1 capsule by mouth daily      levothyroxine (SYNTHROID) 50 MCG tablet Take 1 tablet by mouth daily      busPIRone (BUSPAR) 5 MG tablet Take 1 tablet by mouth daily       Allergies   Allergen Reactions    No Known Allergies        REVIEW OF SYSTEMS  10 systems reviewed, pertinent positives and negatives per HPI, otherwise noted to be negative. PHYSICAL EXAM  ED Triage Vitals [02/05/21 1221]   Enc Vitals Group      BP (!) 157/90      Pulse 95      Resp 16      Temp 98.2 °F (36.8 °C)      Temp Source Oral      SpO2 97 %      Weight 166 lb 6.4 oz (75.5 kg)      Height       Head Circumference       Peak Flow       Pain Score       Pain Loc       Pain Edu? Excl. in [address]? General appearance: Awake and alert. Cooperative. No acute distress. HENT: Normocephalic. Contusion and laceration to right forehead with steri strips in place, clean/dry/intact. Mucous membranes are moist.  Neck: Supple. Mild C spine TTP w/o step off or deformity. Eyes: PERRL. EOMI. Heart/Chest: RRR. No murmurs. Lungs: Respirations unlabored. CTAB. Good air exchange. Speaking comfortably in full sentences. Abdomen: Soft. Non-tender. Non-distended. No rebound or guarding. Musculoskeletal: No extremity edema. No deformity. No tenderness in the extremities. All extremities neurovascularly intact. No T spine TTP, step off, or gross deformity. No L spine TTP, step off, or gross deformity. Pelvis stable and nontender to AP/lateral compression  Skin: Warm and dry. No acute rashes. Facial laceration as above. Neurological: Alert and oriented. CN II-XII intact. Strength 5/5 bilateral upper and lower extremities. Sensation intact to light touch. 5.0 - 8.0    Protein, UA Negative Negative mg/dL    Urobilinogen, Urine 0.2 <2.0 E.U./dL    Nitrite, Urine Negative Negative    Leukocyte Esterase, Urine Negative Negative    Microscopic Examination Not Indicated     Urine Type NotGiven     Urine Reflex to Culture Not Indicated    EKG 12 Lead   Result Value Ref Range    Ventricular Rate 89 BPM    Atrial Rate 89 BPM    P-R Interval 168 ms    QRS Duration 92 ms    Q-T Interval 394 ms    QTc Calculation (Bazett) 479 ms    R Axis -37 degrees    T Axis 15 degrees    Diagnosis       Normal sinus rhythmLeft axis deviationVoltage criteria for left ventricular hypertrophyProlonged QTAbnormal ECGWhen compared with ECG of10.11.20 QTc is now prolonged. Confirmed by Reyes Guerrero MD, [address] (1985) on 2/5/2021 5:42:14 PM       RADIOLOGY  I have reviewed all radiographic studies for this visit. XR CHEST PORTABLE   Final Result   No acute findings. CT HEAD WO CONTRAST   Final Result   1. No acute intracranial abnormality. 2. Mild right frontal scalp hematoma without acute skull fracture or acute   intracranial hemorrhage. CT CERVICAL SPINE WO CONTRAST   Final Result   1. No acute finding in the cervical spine. 2. Partially visualized right thyroid lobe which is enlarged with masslike   appearance extending into the superior mediastinum. An ultrasound was   previously recommended. Correlate with interval workup. US THYROID    (Results Pending)        ECG  EKG interpreted by myself. Rate: normal  Rhythm: NSR  Axis: -37  Intervals:  QRS 92 QTc 479  ST Segments: no acute abnormality  T waves: no acute abnormality  Comparison: Compared to 10/11/2020, there is now left axis deviation  Impression: NSR with left axis deviation and left ventricular hypertrophy and prolonged QTC       ED COURSE/MDM  Patient seen and evaluated. Old records reviewed. Labs and imaging reviewed and results discussed with patient/family to extent possible.       This is an 20-year-old female presents following a fall. Fall unwitnessed at her nursing facility. Patient overall a poor historian. Does complain of neck pain. Vitals notable for mild hypertension otherwise reassuring. Patient at baseline mental status per report. Noncontrast head CT reveals right frontal scalp hematoma, no acute intracranial abnormality. CT cervical spine with no acute abnormality. Does reveal thyroid mass, previously visualized. It seems there has been recommendation for obtaining an ultrasound of the thyroid however this has not yet been performed. Chest x-ray nothing acute. Renal panel no significant electrolyte abnormalities or creatinine ovation. Troponin within normal limits. CBC no leukocytosis or anemia. Urinalysis unremarkable. Chart review reveals patient was admitted to outside hospital in January 2020 for falls at which time an echo was performed and revealed preserved ejection fraction with no regional wall abnormalities and no significant valvular pathology. Given the relatively recent echo I see no indication for admission for further evaluation as to the etiology of the fall at this time. Cervical spine was cleared clinically. The patient and her nursing facility were advised of the results of the cervical spine and specifically the need for ultrasonography thyroid. Tertiary exam reveals no additional injury. Discharge back to nursing facility with usual strict return precautions for new or worsening symptoms communicated. During the patient's ED course, the patient was given:  Medications - No data to display     All questions were answered and the patient/family expressed understanding and agreement with the plan. PROCEDURES  None    CRITICAL CARE  N/A    CLINICAL IMPRESSION  1. Fall, initial encounter    2.  Thyroid mass of unclear etiology        DISPOSITION   discharge     Guanakito Ko MD    Note: This chart was created using voice recognition dictation software. Efforts were made by me to ensure accuracy, however some errors may be present due to limitations of this technology and occasionally words are not transcribed correctly.         Lesly Lyman MD  02/08/21 1408

## 2021-02-05 NOTE — ED NOTES
Report called back to pt nurse Augustine Fonseca at [facility]. Joanne Clarke of plan for discharge and also of need for followup thyroid ultrasound on outpatient basis.      Jody Mcclendon RN  02/05/21 3847

## 2021-02-05 NOTE — ED NOTES
Received call back from [facility] advising they have a unit available in approximately an hour and a half to transport pt back to [facility].      Adarsh Franco RN  02/05/21 5712

## 2021-02-05 NOTE — ED TRIAGE NOTES
Chief Complaint   Patient presents with    Fall     EMS states pt from [facility] for a fall yesterday, unsure what caused it, hit head on dresser, today pt was c/o blurry vision and nausea, upon arrival pt only complaint in neck pain

## 2021-02-06 NOTE — ED NOTES
Report given to [facility].      Melvin Chapa RN  02/05/21 1921

## 2021-02-06 NOTE — ED NOTES
Called [facility] and notified facility that patient was returning. Spoke with [facility] . Report previously given by Jamal Omalley.       Phuc Garcia RN  02/05/21 2011

## 2021-02-24 ENCOUNTER — APPOINTMENT (OUTPATIENT)
Dept: GENERAL RADIOLOGY | Age: 86
DRG: 542 | End: 2021-02-24
Payer: MEDICARE

## 2021-02-24 ENCOUNTER — APPOINTMENT (OUTPATIENT)
Dept: CT IMAGING | Age: 86
DRG: 542 | End: 2021-02-24
Payer: MEDICARE

## 2021-02-24 ENCOUNTER — HOSPITAL ENCOUNTER (INPATIENT)
Age: 86
LOS: 1 days | Discharge: NURSING, REHAB OR LONG-TERM CARE | DRG: 542 | End: 2021-02-25
Attending: EMERGENCY MEDICINE | Admitting: INTERNAL MEDICINE
Payer: MEDICARE
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
## 2021-02-24 DIAGNOSIS — S22.41XA CLOSED FRACTURE OF MULTIPLE RIBS OF RIGHT SIDE, INITIAL ENCOUNTER: ICD-10-CM

## 2021-02-24 DIAGNOSIS — J96.01 ACUTE RESPIRATORY FAILURE WITH HYPOXIA (HCC): Primary | ICD-10-CM

## 2021-02-24 LAB
A/G RATIO: 1.4 (ref 1.1–2.2)
ALBUMIN SERPL-MCNC: 3.9 G/DL (ref 3.4–5)
ALP BLD-CCNC: 82 U/L (ref 40–129)
ALT SERPL-CCNC: 14 U/L (ref 10–40)
AMORPHOUS: ABNORMAL /HPF
ANION GAP SERPL CALCULATED.3IONS-SCNC: 10 MMOL/L (ref 3–16)
AST SERPL-CCNC: 21 U/L (ref 15–37)
BACTERIA: ABNORMAL /HPF
BASOPHILS ABSOLUTE: 0.1 K/UL (ref 0–0.2)
BASOPHILS RELATIVE PERCENT: 0.6 %
BILIRUB SERPL-MCNC: 0.3 MG/DL (ref 0–1)
BILIRUBIN URINE: NEGATIVE
BLOOD, URINE: NEGATIVE
BUN BLDV-MCNC: 14 MG/DL (ref 7–20)
CALCIUM SERPL-MCNC: 8.9 MG/DL (ref 8.3–10.6)
CHLORIDE BLD-SCNC: 104 MMOL/L (ref 99–110)
CLARITY: ABNORMAL
CO2: 25 MMOL/L (ref 21–32)
COLOR: YELLOW
CREAT SERPL-MCNC: <0.5 MG/DL (ref 0.6–1.2)
EKG ATRIAL RATE: 92 BPM
EKG DIAGNOSIS: NORMAL
EKG P AXIS: 28 DEGREES
EKG P-R INTERVAL: 180 MS
EKG Q-T INTERVAL: 392 MS
EKG QRS DURATION: 94 MS
EKG QTC CALCULATION (BAZETT): 484 MS
EKG R AXIS: -36 DEGREES
EKG T AXIS: 111 DEGREES
EKG VENTRICULAR RATE: 92 BPM
EOSINOPHILS ABSOLUTE: 0.1 K/UL (ref 0–0.6)
EOSINOPHILS RELATIVE PERCENT: 0.9 %
EPITHELIAL CELLS, UA: ABNORMAL /HPF (ref 0–5)
GFR AFRICAN AMERICAN: >60
GFR NON-AFRICAN AMERICAN: >60
GLOBULIN: 2.8 G/DL
GLUCOSE BLD-MCNC: 109 MG/DL (ref 70–99)
GLUCOSE URINE: NEGATIVE MG/DL
HCT VFR BLD CALC: 39.7 % (ref 36–48)
HEMOGLOBIN: 13.5 G/DL (ref 12–16)
KETONES, URINE: NEGATIVE MG/DL
LEUKOCYTE ESTERASE, URINE: ABNORMAL
LYMPHOCYTES ABSOLUTE: 2.2 K/UL (ref 1–5.1)
LYMPHOCYTES RELATIVE PERCENT: 20.7 %
MCH RBC QN AUTO: 30.3 PG (ref 26–34)
MCHC RBC AUTO-ENTMCNC: 34 G/DL (ref 31–36)
MCV RBC AUTO: 89.3 FL (ref 80–100)
MICROSCOPIC EXAMINATION: YES
MONOCYTES ABSOLUTE: 0.9 K/UL (ref 0–1.3)
MONOCYTES RELATIVE PERCENT: 8.5 %
NEUTROPHILS ABSOLUTE: 7.5 K/UL (ref 1.7–7.7)
NEUTROPHILS RELATIVE PERCENT: 69.3 %
NITRITE, URINE: NEGATIVE
PDW BLD-RTO: 15.5 % (ref 12.4–15.4)
PH UA: 7 (ref 5–8)
PLATELET # BLD: 234 K/UL (ref 135–450)
PMV BLD AUTO: 9 FL (ref 5–10.5)
POTASSIUM REFLEX MAGNESIUM: 3.8 MMOL/L (ref 3.5–5.1)
PROTEIN UA: NEGATIVE MG/DL
RBC # BLD: 4.45 M/UL (ref 4–5.2)
RBC UA: ABNORMAL /HPF (ref 0–4)
SARS-COV-2, NAAT: NOT DETECTED
SODIUM BLD-SCNC: 139 MMOL/L (ref 136–145)
SPECIFIC GRAVITY UA: 1.01 (ref 1–1.03)
TOTAL PROTEIN: 6.7 G/DL (ref 6.4–8.2)
TROPONIN: <0.01 NG/ML
URINE TYPE: ABNORMAL
UROBILINOGEN, URINE: 0.2 E.U./DL
WBC # BLD: 10.9 K/UL (ref 4–11)
WBC UA: ABNORMAL /HPF (ref 0–5)

## 2021-02-24 PROCEDURE — 84484 ASSAY OF TROPONIN QUANT: CPT

## 2021-02-24 PROCEDURE — 87635 SARS-COV-2 COVID-19 AMP PRB: CPT

## 2021-02-24 PROCEDURE — 99222 1ST HOSP IP/OBS MODERATE 55: CPT | Performed by: PHYSICIAN ASSISTANT

## 2021-02-24 PROCEDURE — 80053 COMPREHEN METABOLIC PANEL: CPT

## 2021-02-24 PROCEDURE — 72170 X-RAY EXAM OF PELVIS: CPT

## 2021-02-24 PROCEDURE — 87186 SC STD MICRODIL/AGAR DIL: CPT

## 2021-02-24 PROCEDURE — 93005 ELECTROCARDIOGRAM TRACING: CPT | Performed by: EMERGENCY MEDICINE

## 2021-02-24 PROCEDURE — 71101 X-RAY EXAM UNILAT RIBS/CHEST: CPT

## 2021-02-24 PROCEDURE — 1200000000 HC SEMI PRIVATE

## 2021-02-24 PROCEDURE — 85025 COMPLETE CBC W/AUTO DIFF WBC: CPT

## 2021-02-24 PROCEDURE — 94761 N-INVAS EAR/PLS OXIMETRY MLT: CPT

## 2021-02-24 PROCEDURE — 6370000000 HC RX 637 (ALT 250 FOR IP): Performed by: EMERGENCY MEDICINE

## 2021-02-24 PROCEDURE — 87077 CULTURE AEROBIC IDENTIFY: CPT

## 2021-02-24 PROCEDURE — 6370000000 HC RX 637 (ALT 250 FOR IP): Performed by: NURSE PRACTITIONER

## 2021-02-24 PROCEDURE — 72192 CT PELVIS W/O DYE: CPT

## 2021-02-24 PROCEDURE — 2700000000 HC OXYGEN THERAPY PER DAY

## 2021-02-24 PROCEDURE — 2580000003 HC RX 258: Performed by: NURSE PRACTITIONER

## 2021-02-24 PROCEDURE — 6360000002 HC RX W HCPCS: Performed by: NURSE PRACTITIONER

## 2021-02-24 PROCEDURE — 6370000000 HC RX 637 (ALT 250 FOR IP): Performed by: PHYSICIAN ASSISTANT

## 2021-02-24 PROCEDURE — 99285 EMERGENCY DEPT VISIT HI MDM: CPT

## 2021-02-24 PROCEDURE — 72125 CT NECK SPINE W/O DYE: CPT

## 2021-02-24 PROCEDURE — 93010 ELECTROCARDIOGRAM REPORT: CPT | Performed by: INTERNAL MEDICINE

## 2021-02-24 PROCEDURE — 70450 CT HEAD/BRAIN W/O DYE: CPT

## 2021-02-24 PROCEDURE — 81001 URINALYSIS AUTO W/SCOPE: CPT

## 2021-02-24 PROCEDURE — 87086 URINE CULTURE/COLONY COUNT: CPT

## 2021-02-24 RX ORDER — BUSPIRONE HYDROCHLORIDE 5 MG/1
5 TABLET ORAL DAILY
Status: DISCONTINUED | OUTPATIENT
Start: 2021-02-24 | End: 2021-02-25 | Stop reason: HOSPADM

## 2021-02-24 RX ORDER — ACETAMINOPHEN 500 MG
1000 TABLET ORAL ONCE
Status: COMPLETED | OUTPATIENT
Start: 2021-02-24 | End: 2021-02-24

## 2021-02-24 RX ORDER — ACETAMINOPHEN 650 MG/1
650 SUPPOSITORY RECTAL EVERY 6 HOURS PRN
Status: DISCONTINUED | OUTPATIENT
Start: 2021-02-24 | End: 2021-02-25 | Stop reason: HOSPADM

## 2021-02-24 RX ORDER — ACETAMINOPHEN 325 MG/1
975 TABLET ORAL EVERY 8 HOURS PRN
COMMUNITY

## 2021-02-24 RX ORDER — PAROXETINE HYDROCHLORIDE 20 MG/1
40 TABLET, FILM COATED ORAL DAILY
Status: DISCONTINUED | OUTPATIENT
Start: 2021-02-24 | End: 2021-02-25 | Stop reason: HOSPADM

## 2021-02-24 RX ORDER — SENNA AND DOCUSATE SODIUM 50; 8.6 MG/1; MG/1
1 TABLET, FILM COATED ORAL 2 TIMES DAILY
Status: DISCONTINUED | OUTPATIENT
Start: 2021-02-24 | End: 2021-02-25 | Stop reason: HOSPADM

## 2021-02-24 RX ORDER — LEVOTHYROXINE SODIUM 0.05 MG/1
50 TABLET ORAL DAILY
Status: DISCONTINUED | OUTPATIENT
Start: 2021-02-24 | End: 2021-02-25 | Stop reason: HOSPADM

## 2021-02-24 RX ORDER — CETIRIZINE HYDROCHLORIDE 10 MG/1
10 TABLET ORAL DAILY
Status: DISCONTINUED | OUTPATIENT
Start: 2021-02-24 | End: 2021-02-25 | Stop reason: HOSPADM

## 2021-02-24 RX ORDER — GABAPENTIN 100 MG/1
100 CAPSULE ORAL 3 TIMES DAILY
COMMUNITY

## 2021-02-24 RX ORDER — ACETAMINOPHEN 325 MG/1
650 TABLET ORAL EVERY 6 HOURS PRN
Status: DISCONTINUED | OUTPATIENT
Start: 2021-02-24 | End: 2021-02-25 | Stop reason: HOSPADM

## 2021-02-24 RX ORDER — ACETAMINOPHEN 325 MG/1
650 TABLET ORAL EVERY 4 HOURS PRN
COMMUNITY

## 2021-02-24 RX ORDER — PROMETHAZINE HYDROCHLORIDE 25 MG/1
12.5 TABLET ORAL EVERY 6 HOURS PRN
Status: DISCONTINUED | OUTPATIENT
Start: 2021-02-24 | End: 2021-02-25 | Stop reason: HOSPADM

## 2021-02-24 RX ORDER — ONDANSETRON 2 MG/ML
4 INJECTION INTRAMUSCULAR; INTRAVENOUS EVERY 6 HOURS PRN
Status: DISCONTINUED | OUTPATIENT
Start: 2021-02-24 | End: 2021-02-25 | Stop reason: HOSPADM

## 2021-02-24 RX ORDER — POLYETHYLENE GLYCOL 3350 17 G/17G
17 POWDER, FOR SOLUTION ORAL DAILY PRN
Status: DISCONTINUED | OUTPATIENT
Start: 2021-02-24 | End: 2021-02-25 | Stop reason: HOSPADM

## 2021-02-24 RX ORDER — CLONIDINE HYDROCHLORIDE 0.1 MG/1
0.1 TABLET ORAL EVERY 4 HOURS PRN
Status: DISCONTINUED | OUTPATIENT
Start: 2021-02-24 | End: 2021-02-25 | Stop reason: HOSPADM

## 2021-02-24 RX ORDER — SODIUM CHLORIDE 0.9 % (FLUSH) 0.9 %
10 SYRINGE (ML) INJECTION EVERY 12 HOURS SCHEDULED
Status: DISCONTINUED | OUTPATIENT
Start: 2021-02-24 | End: 2021-02-25 | Stop reason: HOSPADM

## 2021-02-24 RX ORDER — TRAMADOL HYDROCHLORIDE 50 MG/1
50 TABLET ORAL EVERY 6 HOURS PRN
Status: DISCONTINUED | OUTPATIENT
Start: 2021-02-24 | End: 2021-02-25 | Stop reason: HOSPADM

## 2021-02-24 RX ORDER — ASPIRIN 81 MG/1
81 TABLET, CHEWABLE ORAL DAILY
Status: CANCELLED | OUTPATIENT
Start: 2021-02-24

## 2021-02-24 RX ORDER — LIDOCAINE 4 G/G
1 PATCH TOPICAL ONCE
Status: COMPLETED | OUTPATIENT
Start: 2021-02-24 | End: 2021-02-25

## 2021-02-24 RX ORDER — FLUTICASONE PROPIONATE 44 UG/1
2 AEROSOL, METERED RESPIRATORY (INHALATION) 2 TIMES DAILY
Status: DISCONTINUED | OUTPATIENT
Start: 2021-02-24 | End: 2021-02-25

## 2021-02-24 RX ORDER — POLYETHYLENE GLYCOL 3350 17 G/17G
17 POWDER, FOR SOLUTION ORAL DAILY
Status: DISCONTINUED | OUTPATIENT
Start: 2021-02-24 | End: 2021-02-25 | Stop reason: HOSPADM

## 2021-02-24 RX ORDER — GABAPENTIN 100 MG/1
100 CAPSULE ORAL 3 TIMES DAILY
Status: DISCONTINUED | OUTPATIENT
Start: 2021-02-24 | End: 2021-02-25 | Stop reason: HOSPADM

## 2021-02-24 RX ORDER — ACETYLCYSTEINE 200 MG/ML
3 SOLUTION ORAL; RESPIRATORY (INHALATION) 3 TIMES DAILY
Status: DISCONTINUED | OUTPATIENT
Start: 2021-02-24 | End: 2021-02-25 | Stop reason: HOSPADM

## 2021-02-24 RX ORDER — CALCIUM CARBONATE 500(1250)
1 TABLET ORAL 2 TIMES DAILY
Status: DISCONTINUED | OUTPATIENT
Start: 2021-02-24 | End: 2021-02-25 | Stop reason: HOSPADM

## 2021-02-24 RX ORDER — LIDOCAINE 4 G/G
1 PATCH TOPICAL DAILY
Status: DISCONTINUED | OUTPATIENT
Start: 2021-02-24 | End: 2021-02-25 | Stop reason: HOSPADM

## 2021-02-24 RX ORDER — PANTOPRAZOLE SODIUM 40 MG/1
40 TABLET, DELAYED RELEASE ORAL
Status: DISCONTINUED | OUTPATIENT
Start: 2021-02-25 | End: 2021-02-25 | Stop reason: HOSPADM

## 2021-02-24 RX ORDER — VITAMIN B COMPLEX
1000 TABLET ORAL 4 TIMES DAILY
Status: DISCONTINUED | OUTPATIENT
Start: 2021-02-24 | End: 2021-02-25 | Stop reason: HOSPADM

## 2021-02-24 RX ORDER — SODIUM CHLORIDE 0.9 % (FLUSH) 0.9 %
10 SYRINGE (ML) INJECTION PRN
Status: DISCONTINUED | OUTPATIENT
Start: 2021-02-24 | End: 2021-02-25 | Stop reason: HOSPADM

## 2021-02-24 RX ADMIN — TRAMADOL HYDROCHLORIDE 50 MG: 50 TABLET, FILM COATED ORAL at 19:09

## 2021-02-24 RX ADMIN — ACETAMINOPHEN 1000 MG: 500 TABLET ORAL at 16:44

## 2021-02-24 RX ADMIN — GABAPENTIN 100 MG: 100 CAPSULE ORAL at 23:30

## 2021-02-24 RX ADMIN — Medication 1000 UNITS: at 23:28

## 2021-02-24 RX ADMIN — ENOXAPARIN SODIUM 40 MG: 40 INJECTION SUBCUTANEOUS at 16:45

## 2021-02-24 RX ADMIN — DIBASIC SODIUM PHOSPHATE, MONOBASIC POTASSIUM PHOSPHATE AND MONOBASIC SODIUM PHOSPHATE 500 MG: 852; 155; 130 TABLET ORAL at 23:28

## 2021-02-24 RX ADMIN — ACETAMINOPHEN 650 MG: 325 TABLET ORAL at 23:50

## 2021-02-24 RX ADMIN — CALCIUM 500 MG: 500 TABLET ORAL at 23:28

## 2021-02-24 RX ADMIN — Medication 10 ML: at 23:30

## 2021-02-24 RX ADMIN — DOCUSATE SODIUM 50 MG AND SENNOSIDES 8.6 MG 1 TABLET: 8.6; 5 TABLET, FILM COATED ORAL at 23:30

## 2021-02-24 ASSESSMENT — PAIN DESCRIPTION - DESCRIPTORS
DESCRIPTORS: CONSTANT;SHARP
DESCRIPTORS: SHARP
DESCRIPTORS: SHARP

## 2021-02-24 ASSESSMENT — ENCOUNTER SYMPTOMS
VOMITING: 0
SHORTNESS OF BREATH: 0
COUGH: 0
PHOTOPHOBIA: 0
BACK PAIN: 0
NAUSEA: 0
DIARRHEA: 0
WHEEZING: 0
ABDOMINAL DISTENTION: 0
RHINORRHEA: 0

## 2021-02-24 ASSESSMENT — PAIN DESCRIPTION - LOCATION
LOCATION: RIB CAGE
LOCATION: RIB CAGE;HIP
LOCATION: HIP;RIB CAGE

## 2021-02-24 ASSESSMENT — PAIN DESCRIPTION - ORIENTATION
ORIENTATION: RIGHT

## 2021-02-24 ASSESSMENT — PAIN DESCRIPTION - PAIN TYPE
TYPE: ACUTE PAIN

## 2021-02-24 ASSESSMENT — PAIN SCALES - GENERAL
PAINLEVEL_OUTOF10: 7
PAINLEVEL_OUTOF10: 7

## 2021-02-24 NOTE — ED NOTES
1250- Perfect serve sent to Dr. Constance Rao for admission     Call was returned by Grecia Lemus NP and spoke to Dr. James Sanchez  02/24/21 9038

## 2021-02-24 NOTE — ED PROVIDER NOTES
Emergency Department Provider Note  Location: [place] [address] ED  2/24/2021     Patient Identification  Ayden Youssef is a 81 y.o. female    Chief Complaint  Fall (EMS states pt fell at [facility] this am, c/o right hip and rib pain, unsure on loc, possibly hit her head, 50 mcg fentanyl pta)          HPI  (History provided by patient)  Patient is an 81-year-old female with reported underlying dementia who presents after an unwitnessed fall at assisted living. Collateral history was later obtained that patient was in the process of getting out of bed and suspected he had a mechanical fall falling onto her right side. Patient states that she hit her head she complains of right-sided hip pain and headache on her right side. She also complains of right-sided rib pain. GCS 15. Not on blood thinners or anticoagulants no loss of consciousness. Patient denies any chest pain shortness of breath lightheadedness. I have reviewed the following nursing documentation:  Allergies: Allergies   Allergen Reactions    No Known Allergies        Past medical history:  has a past medical history of Thyroid disease. Past surgical history:  has a past surgical history that includes Total Thyroidectomy and hip surgery (Left, 10/12/2020). Home medications:   Prior to Admission medications    Medication Sig Start Date End Date Taking? Authorizing Provider   gabapentin (NEURONTIN) 100 MG capsule Take 100 mg by mouth 3 times daily.    Yes Historical Provider, MD   acetaminophen (TYLENOL) 325 MG tablet Take 650 mg by mouth every 4 hours as needed for Fever   Yes Historical Provider, MD   acetaminophen (TYLENOL) 325 MG tablet Take 975 mg by mouth every 8 hours as needed for Pain   Yes Historical Provider, MD   ipratropium-albuterol (DUONEB) 0.5-2.5 (3) MG/3ML SOLN nebulizer solution Inhale 3 mLs into the lungs 4 times daily  Patient taking differently: Inhale 3 mLs into the lungs every 6 hours as needed for She has never used smokeless tobacco. She reports that she does not drink alcohol or use drugs. Family history:  History reviewed. No pertinent family history. ROS  Review of Systems   Constitutional: Negative for chills and fever. HENT: Negative for congestion and rhinorrhea. Eyes: Negative for photophobia and visual disturbance. Respiratory: Negative for cough, shortness of breath and wheezing. Cardiovascular: Positive for chest pain. Negative for palpitations. Gastrointestinal: Negative for abdominal distention, diarrhea, nausea and vomiting. Genitourinary: Negative for dysuria and hematuria. Musculoskeletal: Positive for arthralgias. Negative for back pain and neck pain. Skin: Negative for rash and wound. Neurological: Positive for headaches. Negative for syncope and weakness. Psychiatric/Behavioral: Negative for agitation and confusion. Exam  ED Triage Vitals [02/24/21 0923]   BP Temp Temp Source Pulse Resp SpO2 Height Weight   (!) 156/89 98.5 °F (36.9 °C) Oral 90 16 (!) 88 % 5' 7\" (1.702 m) 168 lb 3.2 oz (76.3 kg)       Physical Exam  Vitals signs and nursing note reviewed. Constitutional:       General: She is not in acute distress. Appearance: She is well-developed. HENT:      Head: Normocephalic. Comments: Small hematoma located over the right temporal aspect. There is no tenderness bogginess or depressions noted no facial instability no hemotympanum no septal hematoma     Nose: Nose normal. No congestion. Eyes:      Extraocular Movements: Extraocular movements intact. Pupils: Pupils are equal, round, and reactive to light. Neck:      Musculoskeletal: Normal range of motion and neck supple. Cardiovascular:      Rate and Rhythm: Normal rate and regular rhythm. Heart sounds: No murmur. Comments: Equal radial pulses  Pulmonary:      Effort: Pulmonary effort is normal.      Breath sounds: Normal breath sounds. Comments:  There is right lateral chest wall tenderness. There is no crepitus. There is no bruising or deformity noted  Abdominal:      General: There is no distension. Palpations: Abdomen is soft. Tenderness: There is no abdominal tenderness. There is no guarding or rebound. Musculoskeletal: Normal range of motion. General: No deformity. Comments: Tenderness over the right hip with swelling noted over the right hip. There is no rotation or shortening neurovascularly intact distally lower extremities. Normal range of motion of the hip and knees bilaterally   Skin:     General: Skin is warm. Findings: No rash. Neurological:      Mental Status: She is alert and oriented to person, place, and time. Motor: No abnormal muscle tone. Coordination: Coordination normal.      Comments: NIH stroke scale 0, no cranial nerve deficits appreciated, strength 5 out of 5 all extremities, sensation is intact, no central ataxia, no cerebellar signs present, no deviation of vertical skew. Psychiatric:         Mood and Affect: Mood normal.         Behavior: Behavior normal.           ED Course    ED Medication Orders (From admission, onward)    Start Ordered     Status Ordering Provider    02/24/21 1600 02/24/21 1549  lidocaine 4 % external patch 1 patch  ONCE      Ordered ERICK MUNOZ    02/24/21 1600 02/24/21 1549  acetaminophen (TYLENOL) tablet 1,000 mg  ONCE      Ordered ERICK MUNOZ    02/24/21 1445 02/24/21 1438  enoxaparin (LOVENOX) injection 40 mg  DAILY      Acknowledged JOANN REYNOSO          EKG  Normal sinus rhythm rate of 90 left axis deviation, no diagnostic ischemic changes noted, QTC 44.      Radiology  Xr Ribs Right Include Chest (min 3 Views)    Result Date: 2/24/2021  EXAMINATION: 4 XRAY VIEWS OF THE RIGHT RIBS WITH FRONTAL XRAY VIEW OF THE CHEST 2/24/2021 10:53 am COMPARISON: Chest x-ray 02/05/2021.  HISTORY: ORDERING SYSTEM PROVIDED HISTORY: fall, rib pain TECHNOLOGIST PROVIDED HISTORY: Reason for exam:->fall, rib pain Reason for Exam: FALL, RIB PAIN Acuity: Acute Type of Exam: Initial FINDINGS: The cardiomediastinal silhouette is unremarkable. The lungs are clear. No infiltrate, pleural fluid or pneumothorax. Right rib series demonstrates for fractures of the right lateral 5th and 6th ribs. No other definite fracture is seen. No acute cardiopulmonary disease. Right lateral 5th and 6th rib fractures. Xr Pelvis (1-2 Views)    Result Date: 2/24/2021  EXAMINATION: ONE XRAY VIEW OF THE PELVIS 2/24/2021 10:52 am COMPARISON: None. HISTORY: ORDERING SYSTEM PROVIDED HISTORY: fall TECHNOLOGIST PROVIDED HISTORY: Reason for exam:->fall Reason for Exam: FALL Acuity: Acute Type of Exam: Initial FINDINGS: There is a left hip arthroplasty in place. The hardware appears well seated. There is no evidence of fracture or other acute osseous abnormality. No acute osseous abnormality     Ct Head Wo Contrast    Result Date: 2/24/2021  EXAMINATION: CT OF THE HEAD WITHOUT CONTRAST  2/24/2021 10:39 am TECHNIQUE: CT of the head was performed without the administration of intravenous contrast. Dose modulation, iterative reconstruction, and/or weight based adjustment of the mA/kV was utilized to reduce the radiation dose to as low as reasonably achievable. COMPARISON: February 5, 2021 HISTORY: ORDERING SYSTEM PROVIDED HISTORY: fall TECHNOLOGIST PROVIDED HISTORY: Reason for exam:->fall Has a \"code stroke\" or \"stroke alert\" been called? ->No Decision Support Exception->Emergency Medical Condition (MA) Reason for Exam: FALL Acuity: Acute Type of Exam: Initial FINDINGS: BRAIN/VENTRICLES: There is no acute intracranial hemorrhage, mass effect or midline shift. No abnormal extra-axial fluid collection. The gray-white differentiation is maintained without evidence of an acute infarct. There is no evidence of hydrocephalus.  Periventricular white matter low densities again noted about definitive change most consistent with chronic microvascular ischemia ORBITS: The visualized portion of the orbits demonstrate no acute abnormality. SINUSES: The visualized paranasal sinuses and mastoid air cells demonstrate no acute abnormality. SOFT TISSUES/SKULL:  No acute abnormality of the visualized skull or soft tissues. No acute intracranial abnormality. Ct Cervical Spine Wo Contrast    Result Date: 2/24/2021  EXAMINATION: CT OF THE CERVICAL SPINE WITHOUT CONTRAST 2/24/2021 10:40 am TECHNIQUE: CT of the cervical spine was performed without the administration of intravenous contrast. Multiplanar reformatted images are provided for review. Dose modulation, iterative reconstruction, and/or weight based adjustment of the mA/kV was utilized to reduce the radiation dose to as low as reasonably achievable. COMPARISON: None. HISTORY: ORDERING SYSTEM PROVIDED HISTORY: fall TECHNOLOGIST PROVIDED HISTORY: Reason for exam:->fall Decision Support Exception->Emergency Medical Condition (MA) Reason for Exam: FALL Acuity: Acute Type of Exam: Initial FINDINGS: BONES/ALIGNMENT: No evidence of fracture or subluxation DEGENERATIVE CHANGES: Degenerative disc disease C5-C6 and C6-C7. Diffuse facet osteoarthritis with slight degenerative anterolisthesis of C3 and C4 SOFT TISSUES: No prevertebral soft tissue swelling. 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 Ct Pelvis Wo Contrast    Result Date: 2/24/2021  EXAMINATION: CT OF THE PELVIS WITHOUT CONTRAST 2/24/2021 1:36 pm TECHNIQUE: CT of the pelvis was performed without the administration of intravenous contrast.  Multiplanar reformatted images are provided for review. Dose modulation, iterative reconstruction, and/or weight based adjustment of the mA/kV was utilized to reduce the radiation dose to as low as reasonably achievable. COMPARISON: October 11, 2020.  HISTORY: ORDERING SYSTEM PROVIDED HISTORY: rt hip pain, cannot stand TECHNOLOGIST PROVIDED HISTORY: Reason for exam:->rt hip pain, cannot stand Decision Support Exception->Emergency Medical Condition (MA) Reason for Exam: FALL- PELVIS XRAY  WAS NEG FOR ANY FINDING Acuity: Acute Type of Exam: Initial FINDINGS: Bones/soft tissues: Subtle acute fractures are identified involving the right anterior acetabulum, superior pubic ramus, and right sacral ala. These findings are new since October 2020. Status post left hip arthroplasty. No complication. Mild soft tissue swelling/hematoma is seen adjacent to the greater trochanter of the right hip. No drainable fluid collection. Bowel/GI: Severe diverticulosis is seen. No evidence of acute diverticulitis. Pelvic organs: Normal bladder. The uterus is surgically absent. No suspicious adnexal masses. Peritoneum/retroperitoneum: No free fluid or free air. No pathologic lymphadenopathy. Aorta and its branches are normal in course and caliber with moderate atherosclerosis. 1. Acute fractures involving the right anterior acetabulum, superior pubic ramus, and right sacral ala. 2. Soft tissue swelling/hematoma adjacent to the right greater trochanter. 3. No acute intrapelvic abnormality. 4. Status post left hip arthroplasty. No complication. 5. Diverticulosis. The findings were sent to the Radiology Results [address] at 2:14 pm on 2/24/2021to be communicated to a licensed caregiver.          Labs  Results for orders placed or performed during the hospital encounter of 02/24/21   CBC Auto Differential   Result Value Ref Range    WBC 10.9 4.0 - 11.0 K/uL    RBC 4.45 4.00 - 5.20 M/uL    Hemoglobin 13.5 12.0 - 16.0 g/dL    Hematocrit 39.7 36.0 - 48.0 %    MCV 89.3 80.0 - 100.0 fL    MCH 30.3 26.0 - 34.0 pg    MCHC 34.0 31.0 - 36.0 g/dL    RDW 15.5 (H) 12.4 - 15.4 %    Platelets 799 109 - 627 K/uL    MPV 9.0 5.0 - 10.5 fL    Neutrophils % 69.3 %    Lymphocytes % 20.7 %    Monocytes % 8.5 %    Eosinophils % 0.9 %    Basophils % 0.6 %    Neutrophils Absolute 7.5 1.7 - 7.7 K/uL    Lymphocytes Absolute 2.2 1.0 - 5.1 K/uL    Monocytes Absolute 0.9 0.0 - 1.3 K/uL    Eosinophils Absolute 0.1 0.0 - 0.6 K/uL    Basophils Absolute 0.1 0.0 - 0.2 K/uL   Comprehensive Metabolic Panel w/ Reflex to MG   Result Value Ref Range    Sodium 139 136 - 145 mmol/L    Potassium reflex Magnesium 3.8 3.5 - 5.1 mmol/L    Chloride 104 99 - 110 mmol/L    CO2 25 21 - 32 mmol/L    Anion Gap 10 3 - 16    Glucose 109 (H) 70 - 99 mg/dL    BUN 14 7 - 20 mg/dL    CREATININE <0.5 (L) 0.6 - 1.2 mg/dL    GFR Non-African American >60 >60    GFR African American >60 >60    Calcium 8.9 8.3 - 10.6 mg/dL    Total Protein 6.7 6.4 - 8.2 g/dL    Albumin 3.9 3.4 - 5.0 g/dL    Albumin/Globulin Ratio 1.4 1.1 - 2.2    Total Bilirubin 0.3 0.0 - 1.0 mg/dL    Alkaline Phosphatase 82 40 - 129 U/L    ALT 14 10 - 40 U/L    AST 21 15 - 37 U/L    Globulin 2.8 g/dL   Troponin   Result Value Ref Range    Troponin <0.01 <0.01 ng/mL   Urinalysis, reflex to microscopic   Result Value Ref Range    Color, UA Yellow Straw/Yellow    Clarity, UA SL CLOUDY (A) Clear    Glucose, Ur Negative Negative mg/dL    Bilirubin Urine Negative Negative    Ketones, Urine Negative Negative mg/dL    Specific Gravity, UA 1.015 1.005 - 1.030    Blood, Urine Negative Negative    pH, UA 7.0 5.0 - 8.0    Protein, UA Negative Negative mg/dL    Urobilinogen, Urine 0.2 <2.0 E.U./dL    Nitrite, Urine Negative Negative    Leukocyte Esterase, Urine TRACE (A) Negative    Microscopic Examination YES     Urine Type Catheter    Microscopic Urinalysis   Result Value Ref Range    WBC, UA 6-9 (A) 0 - 5 /HPF    RBC, UA 0-2 0 - 4 /HPF    Epithelial Cells, UA 2-5 0 - 5 /HPF    Bacteria, UA 4+ (A) None Seen /HPF    Amorphous, UA 1+ /HPF   EKG 12 Lead   Result Value Ref Range    Ventricular Rate 92 BPM    Atrial Rate 92 BPM    P-R Interval 180 ms    QRS Duration 94 ms    Q-T Interval 392 ms    QTc Calculation (Bazett) 484 ms P Axis 28 degrees    R Axis -36 degrees    T Axis 111 degrees    Diagnosis       Normal sinus rhythmLeft axis deviationLeft ventricular hypertrophy with repolarization abnormalityAbnormal ECGNo significant change was foundWhen compared with ECG of2.5. 21Confirmed by Belinda Lacy MD, [address] ([id number]) on 2/24/2021 10:41:35 AM         [facility]  Patient seen and evaluated. Relevant records reviewed. 81-year-old female who presents with right-sided rib pain headache and right-sided hip pain after a fall that was unwitnessed at nursing facility. Patient denies any prodromal symptoms but is overall poor historian. Collateral history is not immediately obtainable so labs and imaging EKGs were ordered. Imaging is significant for right-sided fifth incident rib fractures with no associated pneumothorax. No evidence of pelvic or hip fracture however she has difficulty standing so we will add on CT of the pelvis. Medicine to follow results. She is splinting and is requiring supplemental O2 to maintain her O2 saturations therefore will require hospitalization for rib fractures and due to the fact that she cannot stand. Medical work-up is been fairly unremarkable she does have pyuria unclear if she has UTI we will add on urine cultures. We will plan to admit for further management. Of note she initially declined any pain medications will order Lidoderm patch and Tylenol she is agreeable to taking. Clinical Impression:  1. Acute respiratory failure with hypoxia (HCC)    2. Closed fracture of multiple ribs of right side, initial encounter          Disposition:  Admit to med/surg floor in stable condition. Blood pressure (!) 152/81, pulse 92, temperature 98.5 °F (36.9 °C), temperature source Oral, resp. rate 16, height 5' 7\" (1.702 m), weight 168 lb 3.2 oz (76.3 kg), SpO2 91 %. Patient was given scripts for the following medications. I counseled patient how to take these medications.    New Prescriptions    No medications on file Disposition referral (if applicable):  No follow-up provider specified. Total critical care time is 10 minutes, which excludes separately billable procedures and updating family. Time spent is specifically for management of the presenting complaint and symptoms initially, direct bedside care, reevaluation, review of records, and consultation. There was a high probability of clinically significant life-threatening deterioration in the patient's condition, which required my urgent intervention. This chart was generated in part by using Dragon Dictation system and may contain errors related to that system including errors in grammar, punctuation, and spelling, as well as words and phrases that may be inappropriate. If there are any questions or concerns please feel free to contact the dictating provider for clarification.      Mala Son MD  [address] MD  02/24/21 3284

## 2021-02-24 NOTE — PROGRESS NOTES
Consult has been called to Dr. Dell Mcguire on 2/24/21. Spoke with Wangdaizhijia.  5:20 PM    Cathy Carrasco  2/24/2021

## 2021-02-24 NOTE — H&P
Hospital Medicine History & Physical      PCP: Tomie Schilder    Date of Admission: 2/24/2021    Date of Service: Pt seen/examined on 2/24/2021    Chief Complaint:    Chief Complaint   Patient presents with    Fall     EMS states pt fell at [facility] this am, c/o right hip and rib pain, unsure on loc, possibly hit her head, 50 mcg fentanyl pta         History Of Present Illness: The patient is a 81 y.o. female with a PMH of hypothyroidism, GERD, anxiety, depression, and dementia who presented to [facility] ED with complaint of recurrent falls. Patient comes from [facility] after sustaining a fall where she landed on her right side. Patient is a poor historian and is unable to provide any history and does not remember the fall. All history was obtained via ER report. Patient complained of right side pain primarily her hip and ribs. She was noted to be hypoxic at 88% on room air. Per ER report, patient does not wear oxygen at baseline. It was noted on prior admission in October 2020, patient was requiring supplemental oxygen at the time of discharge. CT head, CT cervical spine x-ray of the pelvis were negative. Dedicated x-ray of the ribs showed a right lateral fifth and sixth rib fracture. CT of the pelvis was ordered and is currently pending at time of admission. Patient was admitted to [place] [id number] for further management. Past Medical History:        Diagnosis Date    Thyroid disease        Past Surgical History:        Procedure Laterality Date    HIP SURGERY Left 10/12/2020    LEFT HIP HEMIARTHROPLASTY performed by Mireya Ortiz MD at [address] [place] [address]         Medications Prior to Admission:    Prior to Admission medications    Medication Sig Start Date End Date Taking? Authorizing Provider   gabapentin (NEURONTIN) 100 MG capsule Take 100 mg by mouth 3 times daily.    Yes Historical Provider, MD   acetaminophen (TYLENOL) 325 MG tablet Take 650 mg by mouth every 4 hours as needed for TABS tablet Take 1 tablet by mouth 4 times daily 10/16/20   Leeannruddy Hobson DO   calcium carbonate 648 MG TABS Take 1 tablet by mouth 2 times daily 10/16/20 10/16/21  Leeannruddy Hobson DO   omeprazole (PRILOSEC) 20 MG delayed release capsule Take 1 capsule by mouth daily 10/7/20   Historical Provider, MD       Allergies:  No known allergies    Social History:  The patient currently lives at [facility]. TOBACCO:   reports that she has never smoked. She has never used smokeless tobacco.  ETOH:   reports no history of alcohol use. Family History:   Positive as follows:    History reviewed. No pertinent family history. REVIEW OF SYSTEMS:       Patient is a poor historian but does report right rib pain and hip pain    PHYSICAL EXAM:    BP (!) 152/81   Pulse 92   Temp 98.5 °F (36.9 °C) (Oral)   Resp 16   Ht 5' 7\" (1.702 m)   Wt 168 lb 3.2 oz (76.3 kg)   SpO2 91%   BMI 26.34 kg/m²   Gen: No distress. Alert. Elderly  female pleasantly confused, mildly hard of hearing  Eyes: No sclera icterus. No conjunctival injection. Neck: Trachea midline. Resp: No accessory muscle use. No crackles. No wheezes. No rhonchi. On 2 L  CV: Regular rate. Regular rhythm. No murmur. No rub. No edema. GI: Soft, non-tender. Non-distended. Normal bowel sounds. Skin: Warm and dry. Ecchymosis noted to right hip with firm underlying hematoma  M/S: Patient moves all extremities, able to flex and extend at bilateral hip, knee, ankles and digits. Neuro: Awake. Grossly nonfocal, follows commands, moves all extremities, no dysarthria, no ptosis or facial palsies, sensation intact to bilateral lower extremities  Psych: Oriented to self - she reported the year was 2000. She knew she was at [facility] but was unable to provide which city. Patient knows she fell but was unable to provide any details regarding the fall. No anxiety or agitation.      CBC:   Recent Labs     02/24/21  1022   WBC 10.9   HGB 13.5   HCT 39.7   MCV 89.3        BMP:   Recent Labs     02/24/21  1022      K 3.8      CO2 25   BUN 14   CREATININE <0.5*     LIVER PROFILE:   Recent Labs     02/24/21  1022   AST 21   ALT 14   BILITOT 0.3   ALKPHOS 82     UA:  Recent Labs     02/24/21  1230   COLORU Yellow   PHUR 7.0   WBCUA 6-9*   RBCUA 0-2   BACTERIA 4+*   CLARITYU SL CLOUDY*   SPECGRAV 1.015   LEUKOCYTESUR TRACE*   UROBILINOGEN 0.2   BILIRUBINUR Negative   BLOODU Negative   GLUCOSEU Negative   AMORPHOUS 1+       CARDIAC ENZYMES  Recent Labs     02/24/21  1022   TROPONINI <0.01     U/A:    Lab Results   Component Value Date    COLORU Yellow 02/24/2021    WBCUA 6-9 02/24/2021    RBCUA 0-2 02/24/2021    MUCUS Rare 10/11/2020    BACTERIA 4+ 02/24/2021    CLARITYU SL CLOUDY 02/24/2021    SPECGRAV 1.015 02/24/2021    LEUKOCYTESUR TRACE 02/24/2021    BLOODU Negative 02/24/2021    GLUCOSEU Negative 02/24/2021    AMORPHOUS 1+ 02/24/2021     CULTURES  None    EKG:  I have reviewed the EKG with the following interpretation:   2/24/2021  Normal sinus rhythm rate of 92  Left axis deviation  Left ventricular hypertrophy with repolarization abnormality  Abnormal ECG  No significant change was found  When compared with ECG of2.5.21  Confirmed by Eliza Babb MD, [address] ([id number]) on 2/24/2021 10:41:35 AM    RADIOLOGY  XR RIBS RIGHT INCLUDE CHEST (MIN 3 VIEWS)   Final Result   No acute cardiopulmonary disease. Right lateral 5th and 6th rib fractures. XR PELVIS (1-2 VIEWS)   Final Result   No acute osseous abnormality         CT Cervical Spine WO Contrast   Final Result   No acute abnormality of the cervical spine. CT Head WO Contrast   Final Result   No acute intracranial abnormality. CT PELVIS WO CONTRAST    (Results Pending)     Pertinent previous results reviewed     TTE 1/21/2020  - Left ventricle: Systolic function was normal. The estimated    ejection fraction was in the range of 55% to 60%.  Wall motion was    normal; there were no regional wall motion abnormalities. - Right ventricle: Systolic function was normal. TAPSE:    1.6cm. Tricuspid annular systolic velocity: 9cm/s.  - Pericardium, extracardiac: Mostly pericardial fat appears to be    present, trivial posterior effusion possible.     ASSESSMENT/PLAN:    Acute hypoxic respiratory failure   - O2 saturations 88% on RA on arrival to ER, she does not use O2 at [facility]  - Of note, she required 2L supplemental o2 at time of discharge in 10/2020 after her hip fracture/repair  - Etiology: Likely secondary to rib fractures,  resp splinting secondary to pain  - Admit to Med Surg  - Supplemental O2, wean as tolerated  - Lidocaine patch, PRN Tramadol, IS    Mechanical Fall  Right lateral 5th and 6th rib fractures  Right Sided Pelvic Fractures  Right Hip Hematoma   - checked CT pelvis - acute fractures involving the right anterior acetabulum, superior pubic ramus and right sacral ala  - IS, Lidocaine patch, PRN Tramadol, hold ASA, SCDs  - ortho consult    Left femoral neck fracture sp left hip hemiarthroplasty     - 10/12/2020  - CT showed no complication    HTN  - uncontrolled  - no home BP meds, likely elevated 2/2 pain  - add PRN Clonidine     Hypothyroidism  - cont Synthroid 50 mcg    GERD  - cont Protonix    Anxiety and Depression  - stable  - cont Buspar and Paxil    Dementia  - supportive measures  - oriented to self and situation, not to year or location on admission    DVT Prophylaxis: SCDs 2/2 hematoma  Diet: No diet orders on file  Code Status: Prior     Discussed plan of care with Georgia March MD.     Osmin Madera PA-C 2:34 PM 2/24/2021

## 2021-02-24 NOTE — ED NOTES
This nurse with another nurse attempted to get pt out of bed to ambulate to restroom and pt was unable to bear weight on her legs d/t pain, during inspection we noticed a hematoma to the right side of pt's hip, Dr. Tony Lyman was made aware and came to bed side for examination, pt was then straight cathed for urine sample and 400cc of yellow urine was returned at this time. Pt tolerated well. Belkis care was given and clean brief was placed. Pt also placed on 2-2.5L of O2 for pt sats dropping in the 80's. Pt normally is RA. Dr. Tony Lyman also made aware of that, as her xrays do show 2 fractures to right side of her ribs.       Sidney Chirinos RN  02/24/21 [id number]

## 2021-02-24 NOTE — ED NOTES
Report given to Oliverio Nieves RN at this time pt transferred to Med surg at this time. Pt stable upon transfer.       Bertin Bates RN  02/24/21 1709

## 2021-02-24 NOTE — ED TRIAGE NOTES
Chief Complaint   Patient presents with    Fall     EMS states pt fell at [facility] this am, c/o right hip and rib pain, unsure on loc, possibly hit her head, 50 mcg fentanyl pta

## 2021-02-25 VITALS
HEIGHT: 67 IN | SYSTOLIC BLOOD PRESSURE: 136 MMHG | HEART RATE: 85 BPM | RESPIRATION RATE: 18 BRPM | TEMPERATURE: 97.6 F | DIASTOLIC BLOOD PRESSURE: 77 MMHG | WEIGHT: 168.2 LBS | OXYGEN SATURATION: 96 % | BODY MASS INDEX: 26.4 KG/M2

## 2021-02-25 LAB
ANION GAP SERPL CALCULATED.3IONS-SCNC: 9 MMOL/L (ref 3–16)
BUN BLDV-MCNC: 16 MG/DL (ref 7–20)
CALCIUM SERPL-MCNC: 9.2 MG/DL (ref 8.3–10.6)
CHLORIDE BLD-SCNC: 101 MMOL/L (ref 99–110)
CO2: 25 MMOL/L (ref 21–32)
CREAT SERPL-MCNC: <0.5 MG/DL (ref 0.6–1.2)
GFR AFRICAN AMERICAN: >60
GFR NON-AFRICAN AMERICAN: >60
GLUCOSE BLD-MCNC: 123 MG/DL (ref 70–99)
HCT VFR BLD CALC: 37.6 % (ref 36–48)
HEMOGLOBIN: 12.8 G/DL (ref 12–16)
MCH RBC QN AUTO: 30.7 PG (ref 26–34)
MCHC RBC AUTO-ENTMCNC: 34 G/DL (ref 31–36)
MCV RBC AUTO: 90.4 FL (ref 80–100)
PDW BLD-RTO: 15.4 % (ref 12.4–15.4)
PLATELET # BLD: 180 K/UL (ref 135–450)
PMV BLD AUTO: 8.7 FL (ref 5–10.5)
POTASSIUM REFLEX MAGNESIUM: 3.8 MMOL/L (ref 3.5–5.1)
RBC # BLD: 4.17 M/UL (ref 4–5.2)
SODIUM BLD-SCNC: 135 MMOL/L (ref 136–145)
WBC # BLD: 9.3 K/UL (ref 4–11)

## 2021-02-25 PROCEDURE — 80048 BASIC METABOLIC PNL TOTAL CA: CPT

## 2021-02-25 PROCEDURE — 94761 N-INVAS EAR/PLS OXIMETRY MLT: CPT

## 2021-02-25 PROCEDURE — 99221 1ST HOSP IP/OBS SF/LOW 40: CPT | Performed by: PHYSICIAN ASSISTANT

## 2021-02-25 PROCEDURE — 6370000000 HC RX 637 (ALT 250 FOR IP): Performed by: PHYSICIAN ASSISTANT

## 2021-02-25 PROCEDURE — 6360000002 HC RX W HCPCS: Performed by: NURSE PRACTITIONER

## 2021-02-25 PROCEDURE — 36415 COLL VENOUS BLD VENIPUNCTURE: CPT

## 2021-02-25 PROCEDURE — 2580000003 HC RX 258: Performed by: NURSE PRACTITIONER

## 2021-02-25 PROCEDURE — 99238 HOSP IP/OBS DSCHRG MGMT 30/<: CPT | Performed by: INTERNAL MEDICINE

## 2021-02-25 PROCEDURE — 6370000000 HC RX 637 (ALT 250 FOR IP): Performed by: NURSE PRACTITIONER

## 2021-02-25 PROCEDURE — 85027 COMPLETE CBC AUTOMATED: CPT

## 2021-02-25 PROCEDURE — 2700000000 HC OXYGEN THERAPY PER DAY

## 2021-02-25 PROCEDURE — 94640 AIRWAY INHALATION TREATMENT: CPT

## 2021-02-25 RX ORDER — TRAMADOL HYDROCHLORIDE 50 MG/1
50 TABLET ORAL EVERY 6 HOURS PRN
Qty: 8 TABLET | Refills: 0 | Status: SHIPPED | OUTPATIENT
Start: 2021-02-25 | End: 2021-02-27

## 2021-02-25 RX ORDER — FLUTICASONE PROPIONATE 110 UG/1
1 AEROSOL, METERED RESPIRATORY (INHALATION) 2 TIMES DAILY
Status: DISCONTINUED | OUTPATIENT
Start: 2021-02-25 | End: 2021-02-25 | Stop reason: HOSPADM

## 2021-02-25 RX ORDER — IPRATROPIUM BROMIDE AND ALBUTEROL SULFATE 2.5; .5 MG/3ML; MG/3ML
3 SOLUTION RESPIRATORY (INHALATION) EVERY 6 HOURS PRN
Status: DISCONTINUED | OUTPATIENT
Start: 2021-02-25 | End: 2021-02-25 | Stop reason: HOSPADM

## 2021-02-25 RX ADMIN — PANTOPRAZOLE SODIUM 40 MG: 40 TABLET, DELAYED RELEASE ORAL at 06:33

## 2021-02-25 RX ADMIN — DOCUSATE SODIUM 50 MG AND SENNOSIDES 8.6 MG 1 TABLET: 8.6; 5 TABLET, FILM COATED ORAL at 08:20

## 2021-02-25 RX ADMIN — ENOXAPARIN SODIUM 40 MG: 40 INJECTION SUBCUTANEOUS at 08:21

## 2021-02-25 RX ADMIN — Medication 1000 UNITS: at 11:53

## 2021-02-25 RX ADMIN — GABAPENTIN 100 MG: 100 CAPSULE ORAL at 13:13

## 2021-02-25 RX ADMIN — TRAMADOL HYDROCHLORIDE 50 MG: 50 TABLET, FILM COATED ORAL at 08:30

## 2021-02-25 RX ADMIN — TRAMADOL HYDROCHLORIDE 50 MG: 50 TABLET, FILM COATED ORAL at 13:13

## 2021-02-25 RX ADMIN — Medication 10 ML: at 10:11

## 2021-02-25 RX ADMIN — IPRATROPIUM BROMIDE AND ALBUTEROL SULFATE 3 ML: .5; 3 SOLUTION RESPIRATORY (INHALATION) at 13:31

## 2021-02-25 RX ADMIN — CETIRIZINE HYDROCHLORIDE 10 MG: 10 TABLET ORAL at 08:20

## 2021-02-25 RX ADMIN — BUSPIRONE HYDROCHLORIDE 5 MG: 5 TABLET ORAL at 08:20

## 2021-02-25 RX ADMIN — PAROXETINE HYDROCHLORIDE 40 MG: 20 TABLET, FILM COATED ORAL at 08:20

## 2021-02-25 RX ADMIN — CALCIUM 500 MG: 500 TABLET ORAL at 08:20

## 2021-02-25 RX ADMIN — DIBASIC SODIUM PHOSPHATE, MONOBASIC POTASSIUM PHOSPHATE AND MONOBASIC SODIUM PHOSPHATE 2 TABLET: 852; 155; 130 TABLET ORAL at 08:20

## 2021-02-25 RX ADMIN — TRAMADOL HYDROCHLORIDE 50 MG: 50 TABLET, FILM COATED ORAL at 01:16

## 2021-02-25 RX ADMIN — Medication 1000 UNITS: at 08:20

## 2021-02-25 RX ADMIN — LEVOTHYROXINE SODIUM 50 MCG: 50 TABLET ORAL at 06:33

## 2021-02-25 RX ADMIN — GABAPENTIN 100 MG: 100 CAPSULE ORAL at 08:19

## 2021-02-25 RX ADMIN — POLYETHYLENE GLYCOL (3350) 17 G: 17 POWDER, FOR SOLUTION ORAL at 08:23

## 2021-02-25 RX ADMIN — DIBASIC SODIUM PHOSPHATE, MONOBASIC POTASSIUM PHOSPHATE AND MONOBASIC SODIUM PHOSPHATE 2 TABLET: 852; 155; 130 TABLET ORAL at 11:53

## 2021-02-25 RX ADMIN — ACETYLCYSTEINE 600 MG: 200 INHALANT RESPIRATORY (INHALATION) at 13:32

## 2021-02-25 ASSESSMENT — PAIN DESCRIPTION - LOCATION: LOCATION: HIP;RIB CAGE

## 2021-02-25 ASSESSMENT — PAIN DESCRIPTION - ORIENTATION
ORIENTATION: RIGHT
ORIENTATION: RIGHT

## 2021-02-25 ASSESSMENT — PAIN DESCRIPTION - PAIN TYPE: TYPE: ACUTE PAIN

## 2021-02-25 ASSESSMENT — PAIN SCALES - GENERAL
PAINLEVEL_OUTOF10: 3
PAINLEVEL_OUTOF10: 7

## 2021-02-25 ASSESSMENT — PAIN DESCRIPTION - DESCRIPTORS: DESCRIPTORS: ACHING

## 2021-02-25 NOTE — CARE COORDINATION
Case Management Assessment  Initial Evaluation and Dc Note      Patient Name: Aidan Drummond  YOB: 1935  Diagnosis: Acute respiratory failure with hypoxia [facility]) [J96.01]  Date / Time: 2/24/2021  9:16 AM    Admission status/Date:2/24/2021  Chart Reviewed: Yes      Patient [place]: Yes -confused  Family Interviewed:  Yes - umer Ocasio by telephone      Hospitalization in the last 30 days:  No      Health Care Decision Maker :     (CM - must 1st enter selection under Navigator - emergency contact- Health Care Decision Maker Relationship and pick relationship)   Who do you trust or have selected to make healthcare decisions for you      Met with: umer Frost conducted  (bedside/phone):telephone    Current PCP:   LTC @ [facility] required for SNF : N          3 night stay required - [employer]  Support Systems/Care Needs:    Transportation: EMS transportation    Meal Preparation: facility    Housing  Living Arrangements: LTC @ [facility]  Steps:   Intent for return to present living arrangements: Yes  Identified Issues:     Durable Medical Equiptment   DME Provider: facility  Equipment: facility  Walker___Cane___RTS___ BSC___Shower Chair___Hospital Bed___W/C____Other________  02 at ____Liter(s)---wears(frequency)_______ [facility] ___ CPAP___ BiPap___   N/A____      Home O2 Use :  ecf    If No for home O2---if presently on O2 during hospitalization:  Yes  if yes CM to follow for potential DC O2 need  Informed of need for care provider to bring portable home O2 tank on day of discharge for nursing to connect prior to leaving:   Not Indicated  Verbalized agreement/Understanding:   Not Indicated    Community Service Affiliation  Dialysis:  No    · Agency:  · Location:  · Dialysis Schedule:  · Phone:   · Fax:     Other Community Services: (ex:PT/OT,Mental Health,Wound Clinic, [address] [place] [address] Alejandro Estes)    DISCHARGE PLAN: Explained Case Management role/services. Chart reviewed. Met with pt and she is pleasantly confused. TC to umer Calero and he states pt is LTC @ [facility] and plan is for her to return. TC to [place]ana Wan @ [facility] and she states pt is +bedhold and can return with negative rapid COVID test within 24 hours of discharge. Following                                                                                                          DISCHARGE ORDER  Date/Time 2021 11:30 AM  Completed by: Mamie Matthews, Case Management    Patient Name: Lilian Chowdhury    : 1935      Admit order Date and Status:2021  Noted discharge order. (verify MD's last order for status of admission/Traditional Medicare 3 [place] Inpatient qualifying stay required for SNF)    Confirmed discharge plan with:              Patient:  No-confused              When pt confirms DC plan does any support person need to be contacted by CM Yes if yes who_umer Rico_____                      Discharge to Facility: [facility] phone number for staff giving report: [phone]   Pre-certification completed: not needed [facility] Exemption Notification (HENS) completed: not needed LTC  Discharge orders and Continuity of Care faxed to facility:  yes      Transportation:               Medical Transport explained with choice list offered to pt/family. Choice:Yes(no preference)  Agency used: [facility]   time:   1400      Pt/family/Nursing/Facility aware of  time:   Yes Names: umer Nj bedside RN/Leah @ [facility]  Ambulance form completed:  yes:      Comments:Chart reviewed. Pt is returning to LTC @ [facility]. Spoke with umer Calero by phone and he is aware pt will be discharged today and is aware of  time. Marcel Cuenca @ [facility] aware of  time. Negative COVID test from  is within the needed 24 hours timeframe. Pt is being d/c'd to [facility] today. Pt's O2 sats are 97% on 2L. Discharge timeout done with Jami.  All discharge needs and concerns addressed. Discharging nurse to complete BORIS, reconcile AVS, and place final copy with patient's discharge packet. Discharging RN to ensure that written prescriptions for  Level II medications are sent with patient to the facility as per protocol.

## 2021-02-25 NOTE — PROGRESS NOTES
States that she is still having rib cage and hip pain. Tramadol 50 mg po given. Assisted to reposition for comfort. Will continue to monitor.

## 2021-02-25 NOTE — PROGRESS NOTES
Progress Note    Admit Date:  2/24/2021    Subjective:  Ms. Jenifer Gutiérrez states she is feeling well. Denies any pain. She is pleasantly confused. Currently on  1 L     Objective:   Patient Vitals for the past 4 hrs:   BP Temp Temp src Pulse Resp SpO2   02/25/21 0815 136/77 97.6 °F (36.4 °C) Oral 85 16 97 %            Intake/Output Summary (Last 24 hours) at 2/25/2021 0931  Last data filed at 2/25/2021 0920  Gross per 24 hour   Intake 120 ml   Output 300 ml   Net -180 ml       Physical Exam:  Gen: No distress. Alert. Elderly  female pleasantly confused, mildly hard of hearing  Eyes: No sclera icterus. No conjunctival injection. Neck: Trachea midline. Resp: No accessory muscle use. No crackles. No wheezes. No rhonchi. CV: Regular rate. Regular rhythm. No murmur. No rub. No edema. GI: Soft, non-tender. Non-distended. Normal bowel sounds. Skin: Warm and dry. Ecchymosis noted to right hip with firm underlying hematoma  M/S: Patient moves all extremities, able to flex and extend at bilateral hip, knee, ankles and digits. Neuro: Awake.  Grossly nonfocal, follows commands, moves all extremities, no dysarthria, no ptosis or facial palsies, sensation intact to bilateral lower extremities  Psych: Oriented to self, knows she is in the hospital, unable to recall year or location    Scheduled Meds:   fluticasone  1 puff Inhalation BID    acetylcysteine  3 mL Inhalation TID    busPIRone  5 mg Oral Daily    calcium elemental  1 tablet Oral BID    cetirizine  10 mg Oral Daily    gabapentin  100 mg Oral TID    levothyroxine  50 mcg Oral Daily    lidocaine  1 patch Transdermal Daily    pantoprazole  40 mg Oral QAM AC    PARoxetine  40 mg Oral Daily    phosphorus  2 tablet Oral 4x Daily    polyethylene glycol  17 g Oral Daily    sennosides-docusate sodium  1 tablet Oral BID    Vitamin D  1,000 Units Oral 4x daily    sodium chloride flush  10 mL Intravenous 2 times per day    enoxaparin  40 mg Subcutaneous Daily     PRN Meds:  ipratropium-albuterol, sodium chloride flush, promethazine **OR** ondansetron, polyethylene glycol, acetaminophen **OR** acetaminophen, traMADol, cloNIDine      Data:  CBC:   Recent Labs     02/24/21  1022 02/25/21  0652   WBC 10.9 9.3   HGB 13.5 12.8   HCT 39.7 37.6   MCV 89.3 90.4    180     BMP:   Recent Labs     02/24/21  1022 02/25/21  0652    135*   K 3.8 3.8    101   CO2 25 25   BUN 14 16   CREATININE <0.5* <0.5*     LIVER PROFILE:   Recent Labs     02/24/21  1022   AST 21   ALT 14   BILITOT 0.3   ALKPHOS 82     CULTURES    Urine cx: pending    RAPID COVID: not detected     RADIOLOGY    CT PELVIS WO CONTRAST   Preliminary Result   1. Acute fractures involving the right anterior acetabulum, superior pubic   ramus, and right sacral ala. 2. Soft tissue swelling/hematoma adjacent to the right greater trochanter. 3. No acute intrapelvic abnormality. 4. Status post left hip arthroplasty. No complication. 5. Diverticulosis. The findings were sent to the Radiology Results [address] at 2:14   pm on 2/24/2021to be communicated to a licensed caregiver. XR RIBS RIGHT INCLUDE CHEST (MIN 3 VIEWS)   Final Result   No acute cardiopulmonary disease. Right lateral 5th and 6th rib fractures. XR PELVIS (1-2 VIEWS)   Final Result   No acute osseous abnormality         CT Cervical Spine WO Contrast   Final Result   No acute abnormality of the cervical spine. CT Head WO Contrast   Final Result   No acute intracranial abnormality.            Assessment/Plan:    Acute hypoxic respiratory failure   - O2 saturations 88% on RA on arrival to ER, she does not use O2 at [facility]  - Of note, she required 2L supplemental o2 at time of discharge in 10/2020 after her hip fracture/repair  - Etiology: Likely secondary to rib fractures,  resp splinting secondary to pain; rapid covid neg  - Admitted to Med Surg  - Supplemental O2, wean as tolerated  - Lidocaine patch, PRN Tramadol, IS  - RN to wean O2     Mechanical Fall  Right lateral 5th and 6th rib fractures  Right Sided Pelvic Fractures  Right Hip Hematoma   - checked CT pelvis - acute fractures involving the right anterior acetabulum, superior pubic ramus and right sacral ala  - IS, Lidocaine patch, PRN Tramadol, hold ASA, SCDs  - ortho consult - await recs     Left femoral neck fracture sp left hip hemiarthroplasty     - 10/12/2020  - CT showed no complication     HTN  - uncontrolled on arrival -> improving  - no home BP meds, likely elevated 2/2 pain  - PRN Clonidine      Hypothyroidism  - cont Synthroid 50 mcg     GERD  - cont Protonix     Anxiety and Depression  - stable  - cont Buspar and Paxil     Dementia  - supportive measures  - oriented to self and situation, not to year or location on admission    DVT Prophylaxis: SCD  Diet: DIET GENERAL;  Code Status: Full Code    Dispo: await ortho recs, wean O2, likely d/c back to [facility] today    Abhinav Wood PA-C 9:43 AM 2/25/2021     Agree with above     D/c back to [facility]     MIGUEL Gonzalez. 20

## 2021-02-25 NOTE — PROGRESS NOTES
20g angiocath removed from R wrist without difficulty. Dry dressing applied. Patient tolerated well.

## 2021-02-25 NOTE — PROGRESS NOTES
Mucomyst not given because needs to be given with a bronchodilator. No bronchodilator ordered. Nurse made aware. Flovent 44 not given because we do not carry this dosage.

## 2021-02-25 NOTE — FLOWSHEET NOTE
02/25/21 1408   Handoff   Communication Given Transfer Handoff   Oncoming Nurse/Offgoing Nurse Belle/Arian   Handoff Communication Telephone   Time Handoff Given [phone]     Report given to Darryn Prescott at [facility].

## 2021-02-25 NOTE — DISCHARGE SUMMARY
Name:  Miesha Blas  Room:  0212/0212-01  MRN:    [id number]    Discharge Summary      This discharge summary is in conjunction with a complete physical exam done on the day of discharge. Discharging Provider: Osmin Madera PA-C  Attending Physician: Georgia March MD       Admit: 2/24/2021  Discharge:  2/25/2021    HPI taken from admission H&P:      The patient is a 81 y.o. female with a PMH of hypothyroidism, GERD, anxiety, depression, and dementia who presented to [facility] ED with complaint of recurrent falls. Patient comes from [facility] after sustaining a fall where she landed on her right side. Patient is a poor historian and is unable to provide any history and does not remember the fall. All history was obtained via ER report. Patient complained of right side pain primarily her hip and ribs. She was noted to be hypoxic at 88% on room air. Per ER report, patient does not wear oxygen at baseline. It was noted on prior admission in October 2020, patient was requiring supplemental oxygen at the time of discharge. CT head, CT cervical spine x-ray of the pelvis were negative. Dedicated x-ray of the ribs showed a right lateral fifth and sixth rib fracture. CT of the pelvis was ordered and is currently pending at time of admission.   Patient was admitted to [place] [address] for further management    Diagnoses this Admission and Hospital Course     Acute hypoxic respiratory failure   - O2 saturations 88% on RA on arrival to ER, she does not use O2 at [facility]  - Of note, she required 2L supplemental o2 at time of discharge in 10/2020 after her hip fracture/repair  - Etiology: Likely secondary to rib fractures,  resp splinting secondary to pain; rapid covid neg  - Admitted to Med Surg  - Supplemental O2, wean as tolerated  - Lidocaine patch, PRN Tramadol, IS  - RN to wean O2  - weaned to 1L at d/c, pt to be discharged back to SNF     Mechanical Fall  Right lateral 5th and 6th rib fractures  Right Sided Pelvic Fractures  Right Hip Hematoma   - checked CT pelvis - acute fractures involving the right anterior acetabulum, superior pubic ramus and right sacral ala  - IS, Lidocaine patch, PRN Tramadol, hold ASA, SCDs  - ortho consulted - no surgical intervention necessary, keep at 25% WB for now, ice for swelling control and f/u with ortho in 2-3 weeks  - discharged with Lovenox and Ultram, trend CBC every other day to monitor Hgb levels in setting of hematoma, risk vs benefit assessed, pt at high risk for VTE given multiple pelvic fractures, will continue with lovenox as above, stopped ASA     Left femoral neck fracture sp left hip hemiarthroplasty     - 10/12/2020  - CT showed no complication     HTN  - uncontrolled on arrival -> improved  - no home BP meds, likely elevated 2/2 pain  - PRN Clonidine      Hypothyroidism  - continued Synthroid 50 mcg     GERD  - continued Protonix     Anxiety and Depression  - stable  - continued Buspar and Paxil     Dementia  - supportive measures  - oriented to self and situation, not to year or location on admission    Procedures (Please Review Full Report for Details)  N/A    Consults    Orthopedic Surgery    Physical Exam at Discharge:    /77   Pulse 85   Temp 97.6 °F (36.4 °C) (Oral)   Resp 16   Ht 5' 7\" (1.702 m)   Wt 168 lb 3.2 oz (76.3 kg)   SpO2 97%   BMI 26.34 kg/m²   Gen: No distress. Alert. Taranta Kailash  female pleasantly confused, mildly hard of hearing  Eyes: No sclera icterus. No conjunctival injection. Neck: Trachea midline. Resp: No accessory muscle use. No crackles. No wheezes. No rhonchi.  On 2 L  CV: Regular rate. Regular rhythm. No murmur.  No rub. No edema. GI: Soft, non-tender. Non-distended. Normal bowel sounds. Skin: Warm and dry.  Ecchymosis noted to right hip with firm underlying hematoma  M/S: Patient moves all extremities, able to flex and extend at bilateral hip, knee, ankles and digits. Neuro: Awake.  Grossly nonfocal, follows commands, moves all extremities, no dysarthria, no ptosis or facial palsies, sensation intact to bilateral lower extremities  Psych: Oriented to self, knows she is in the hospital, unable to recall year or location    CBC:   Recent Labs     02/24/21  1022 02/25/21  0652   WBC 10.9 9.3   HGB 13.5 12.8   HCT 39.7 37.6   MCV 89.3 90.4    180     BMP:   Recent Labs     02/24/21  1022 02/25/21  0652    135*   K 3.8 3.8    101   CO2 25 25   BUN 14 16   CREATININE <0.5* <0.5*     LIVER PROFILE:   Recent Labs     02/24/21  1022   AST 21   ALT 14   BILITOT 0.3   ALKPHOS 82     UA:  Recent Labs     02/24/21  1230   COLORU Yellow   PHUR 7.0   WBCUA 6-9*   RBCUA 0-2   BACTERIA 4+*   CLARITYU SL CLOUDY*   SPECGRAV 1.015   LEUKOCYTESUR TRACE*   UROBILINOGEN 0.2   BILIRUBINUR Negative   BLOODU Negative   GLUCOSEU Negative   AMORPHOUS 1+     CULTURES    Urine cx: pending    Rapid COVID 19: Not detected    RADIOLOGY    CT PELVIS WO CONTRAST 2/24/2021   Preliminary Result   1. Acute fractures involving the right anterior acetabulum, superior pubic   ramus, and right sacral ala. 2. Soft tissue swelling/hematoma adjacent to the right greater trochanter. 3. No acute intrapelvic abnormality. 4. Status post left hip arthroplasty. No complication. 5. Diverticulosis. The findings were sent to the Radiology Results [address] at 2:14   pm on 2/24/2021to be communicated to a licensed caregiver. XR RIBS RIGHT INCLUDE CHEST (MIN 3 VIEWS) 2/24/2021   Final Result   No acute cardiopulmonary disease. Right lateral 5th and 6th rib fractures. XR PELVIS (1-2 VIEWS) 2/24/2021   Final Result   No acute osseous abnormality         CT Cervical Spine WO Contrast 2/24/2021   Final Result   No acute abnormality of the cervical spine. CT Head WO Contrast 2/24/2021   Final Result   No acute intracranial abnormality.            Discharge Medications     Medication List START taking these medications    enoxaparin 40 MG/0.4ML injection  Commonly known as: Lovenox  Inject 0.4 mLs into the skin daily for 14 days  Notes to patient: Enoxaparin (Lovenox®)  Use: prevent blood clots, treat blood clots, to prevent a stroke. Side effects: bruising and irritation around the injection site. traMADol 50 MG tablet  Commonly known as: ULTRAM  Take 1 tablet by mouth every 6 hours as needed for Pain for up to 2 days. CHANGE how you take these medications    ipratropium-albuterol 0.5-2.5 (3) MG/3ML Soln nebulizer solution  Commonly known as: DUONEB  Inhale 3 mLs into the lungs 4 times daily  What changed:   · when to take this  · reasons to take this        CONTINUE taking these medications    * acetaminophen 325 MG tablet  Commonly known as: TYLENOL     * acetaminophen 325 MG tablet  Commonly known as: TYLENOL     alendronate 70 MG tablet  Commonly known as: FOSAMAX  Take 1 tablet by mouth every 7 days     bisacodyl 10 MG suppository  Commonly known as: DULCOLAX     busPIRone 5 MG tablet  Commonly known as: BUSPAR     cetirizine 10 MG tablet  Commonly known as: ZYRTEC  Take 1 tablet by mouth daily     fluticasone 44 MCG/ACT inhaler  Commonly known as: Flovent HFA  Inhale 2 puffs into the lungs 2 times daily     gabapentin 100 MG capsule  Commonly known as: NEURONTIN     levothyroxine 50 MCG tablet  Commonly known as: SYNTHROID     lidocaine 4 % external patch  Place 1 patch onto the skin daily     melatonin ER 1 MG Tbcr tablet  Take 1 tablet by mouth every evening     PARoxetine 40 MG tablet  Commonly known as: PAXIL  Take 1 tablet by mouth daily     phosphorus 155-852-130 MG tablet  Commonly known as: K PHOS NEUTRAL  Take 2 tablets by mouth 4 times daily     polyethylene glycol 17 g packet  Commonly known as: GLYCOLAX  Take 17 g by mouth daily         * This list has 2 medication(s) that are the same as other medications prescribed for you.  Read the directions carefully, and ask your doctor or other care provider to review them with you. STOP taking these medications    ASPIRIN 81 PO     vitamin D3 25 MCG (1000 UT) Tabs tablet  Commonly known as: CHOLECALCIFEROL           Where to Get Your Medications      You can get these medications from any pharmacy    Bring a paper prescription for each of these medications  · traMADol 50 MG tablet     Information about where to get these medications is not yet available    Ask your nurse or doctor about these medications  · enoxaparin 40 MG/0.4ML injection           Discharged in stable condition back to [facility]. Follow Up: Follow up with physician at [facility]. Will need to f/u OP with orthopedic surgery in 2-3 weeks. Osmin Madera PA-C 11:10 AM 2/25/2021           KOBI Jaime M.D.

## 2021-02-25 NOTE — FLOWSHEET NOTE
02/25/21 0815   Vital Signs   Temp 97.6 °F (36.4 °C)   Temp Source Oral   Pulse 85   Heart Rate Source Monitor   Resp 16   /77   BP Location Right upper arm   Patient Position Semi fowlers   Level of Consciousness Alert (0)   MEWS Score 1   Patient Currently in Pain Yes   Pain Assessment   Pain Assessment 0-10   Pain Level 7   Oxygen Therapy   SpO2 97 %   Pulse Oximeter Device Mode Intermittent   O2 Device Nasal cannula   Skin Assessment Clean, dry, & intact   O2 Flow Rate (L/min) 2 L/min     AM assessment completed. See flowsheet. Patient c/o pain to pelvis and R ribs and rated it 7/10. PRN Tramadol given per MAR. Will monitor for effectiveness. No s/s acute distress noted. Bed locked and in low position. Bed alarm on. Call light in reach.

## 2021-02-25 NOTE — PROGRESS NOTES
See PM shift assessment. Eating snack. Took medications w/o diff. Tylenol given for pain. Very pleasant and cooperative; states that the SCD's feel good on her legs. telesitter in use. Assisting to change positions q 2 hrs. Refuses to lay on right hip d/t hematoma and pain. Call light in reach. Will continue to monitor.

## 2021-02-25 NOTE — CONSULTS
Department of Orthopedic Surgery  Physician Assistant   Consult Note        Reason for Consult:  Right hip pain  Requesting Physician: Miguelito Norton*  Date of Service: 2/25/2021 10:36 AM    CHIEF COMPLAINT:  As Above    History Obtained From:  patient, electronic medical record    HISTORY OF PRESENT ILLNESS:                The patient is a 81 y.o. female who presents with above chief complaint. Pt fell yesterday at SNF and landed on her right hip. She complained of pain after. Unable to get up or walk after the fall. No known prior hip injuries. No n/t in the leg. Past Medical History:        Diagnosis Date    Thyroid disease      Past Surgical History:        Procedure Laterality Date    HIP SURGERY Left 10/12/2020    LEFT HIP HEMIARTHROPLASTY performed by Hernan Odell MD at [address] [place] [address]           Medications Prior to Admission:   Prior to Admission medications    Medication Sig Start Date End Date Taking? Authorizing Provider   gabapentin (NEURONTIN) 100 MG capsule Take 100 mg by mouth 3 times daily.    Yes Historical Provider, MD   acetaminophen (TYLENOL) 325 MG tablet Take 650 mg by mouth every 4 hours as needed for Fever   Yes Historical Provider, MD   acetaminophen (TYLENOL) 325 MG tablet Take 975 mg by mouth every 8 hours as needed for Pain   Yes Historical Provider, MD   ipratropium-albuterol (DUONEB) 0.5-2.5 (3) MG/3ML SOLN nebulizer solution Inhale 3 mLs into the lungs 4 times daily  Patient taking differently: Inhale 3 mLs into the lungs every 6 hours as needed for Shortness of Breath  10/16/20  Yes Elis Anand, DO   PARoxetine (PAXIL) 40 MG tablet Take 1 tablet by mouth daily 10/16/20  Yes Elis Anand DO   cetirizine (ZYRTEC) 10 MG tablet Take 1 tablet by mouth daily 10/16/20  Yes Elis Anand DO   lidocaine 4 % external patch Place 1 patch onto the skin daily 10/16/20  Yes Elis Anand DO   melatonin ER 1 MG TBCR tablet Take 1 tablet by mouth every evening 10/16/20  Yes Haris Rm, DO   phosphorus (K PHOS NEUTRAL) 087-439-155 MG tablet Take 2 tablets by mouth 4 times daily 10/16/20  Yes Haris Rm, DO   fluticasone (FLOVENT HFA) 44 MCG/ACT inhaler Inhale 2 puffs into the lungs 2 times daily 10/16/20  Yes Haris Rm, DO   alendronate (FOSAMAX) 70 MG tablet Take 1 tablet by mouth every 7 days 10/16/20  Yes Haris Rm, DO   polyethylene glycol (GLYCOLAX) 17 g packet Take 17 g by mouth daily 10/16/20  Yes Haris Rm, DO   ASPIRIN 81 PO Take 81 mg by mouth daily   Yes Historical Provider, MD   bisacodyl (DULCOLAX) 10 MG suppository Place 10 mg rectally daily as needed 1/22/20  Yes Historical Provider, MD   levothyroxine (SYNTHROID) 50 MCG tablet Take 1 tablet by mouth daily 7/28/20  Yes Historical Provider, MD   busPIRone (BUSPAR) 5 MG tablet Take 1 tablet by mouth daily 10/7/20  Yes Historical Provider, MD       Allergies:  No known allergies    Social History:    Tobacco:  reports that she has never smoked. She has never used smokeless tobacco.   Alcohol:  reports no history of alcohol use. Illicit Drug: No  Family History:   History reviewed. No pertinent family history. REVIEW OF SYSTEMS:    CONSTITUTIONAL:  negative  MUSCULOSKELETAL:  positive for  pain  All other systems reviewed and negative    PHYSICAL EXAM:    awake, alert, cooperative, no apparent distress, and appears stated age  MUSCULOSKELETAL:  there is no redness, warmth, or swelling of the joints  full range of motion noted  motor strength is 5 out of 5 all extremities bilaterally  tone is normal  with exception of  RIGHT HIP:  redness absent  warmth absent  swelling present  tenderness present and and noted in the groin and lateral aspect of the hip. No pain with log roll of the leg. Sensation intact to touch. Some ecchymosis present buttock into the lateral hip area. No open wounds. range of motion limited due to pain. Moving foot and ankle.     DATA:    CBC:   Recent Labs 02/24/21  1022 02/25/21  0652   WBC 10.9 9.3   HGB 13.5 12.8    180     BMP:    Recent Labs     02/24/21  1022 02/25/21  0652    135*   K 3.8 3.8    101   CO2 25 25   BUN 14 16   CREATININE <0.5* <0.5*   GLUCOSE 109* 123*     INR: No results for input(s): INR in the last 72 hours. Radiology:   CT PELVIS WO CONTRAST   Preliminary Result   1. Acute fractures involving the right anterior acetabulum, superior pubic   ramus, and right sacral ala. 2. Soft tissue swelling/hematoma adjacent to the right greater trochanter. 3. No acute intrapelvic abnormality. 4. Status post left hip arthroplasty. No complication. 5. Diverticulosis. The findings were sent to the Radiology Results [address] at 2:14   pm on 2/24/2021to be communicated to a licensed caregiver. XR RIBS RIGHT INCLUDE CHEST (MIN 3 VIEWS)   Final Result   No acute cardiopulmonary disease. Right lateral 5th and 6th rib fractures. XR PELVIS (1-2 VIEWS)   Final Result   No acute osseous abnormality         CT Cervical Spine WO Contrast   Final Result   No acute abnormality of the cervical spine. CT Head WO Contrast   Final Result   No acute intracranial abnormality. IMPRESSION/RECOMMENDATIONS:    Assessment: pelvic fracture - right side, acetabular fracture    Plan:  1) Reviewed films and Dr Jamilah Barajas discussed with ER on admission. No surgical intervention necessary at this time. To protect the known fractures, we will keep her at 25% WB for now and advance as these fractures heal.  Will need to continue with ice for swelling control and appropriate pain medication regimen. We will plan to see her in f/u at the office in 2-3 weeks for recheck. She can begin working with PT during her stay here and continue at the SNF. Thank you for the opportunity to consult on this patient.     Clarisse Arzola

## 2021-02-25 NOTE — DISCHARGE INSTR - COC
Continuity of Care Form    Patient Name: Lilian Chowdhury   :  1935  MRN:  [id number]    Admit date:  2021  Discharge date:  2021    Code Status Order: Full Code   Advance Directives:   [facility] Documentation       Date/Time Healthcare Directive Type of Healthcare Directive Copy in [address] Agent's Name Healthcare Agent's Phone Number    [phone]  Unknown, patient unable to respond due to medical condition -- -- -- -- --            Admitting Physician:  Joshua Charles MD  PCP: Lukas Ennis    Discharging Nurse: Joseph Morgan Unit/Room#: 0212/0212-01  Discharging Unit Phone Number: [phone]    Emergency Contact:   Extended Emergency Contact Information  Primary Emergency Contact: Jacky Davenport   [address] Phone: [phone]  Mobile Phone: [phone]  Relation: Child  Secondary Emergency Contact: Bindu Davenport   [address] Phone: [phone]  Mobile Phone: [phone]  Relation: Grandchild    Past Surgical History:  Past Surgical History:   Procedure Laterality Date    HIP SURGERY Left 10/12/2020    LEFT HIP HEMIARTHROPLASTY performed by Mildred Perdue MD at [address] [place] [address]         Immunization History: There is no immunization history on file for this patient. Active Problems:  Patient Active Problem List   Diagnosis Code    Closed displaced fracture of left femoral neck with malunion S72.002P    Leukocytosis D72.829    Osteoarthritis M19.90    Elevated CK R74.8    Fall W19. XXXA    Prolonged QT interval R94.31    Hypothyroidism E03.9    Hematoma T14. 8XXA    GERD (gastroesophageal reflux disease) K21.9    Depression F32.9    DDD (degenerative disc disease) [id number]    Colitis K52.9    Bell's palsy G51.0    Acute cystitis N30.00    AK (actinic keratosis) L57.0    Anxiety F41.9    Backache M54.9    Sternal fracture S22.20XA    Scalp laceration S01. 01XA    Pulmonary contusion S27.329A    Neck pain M54.2    Narrowing of intervertebral disc space M99.79    Laceration of great toe S91.113A    Elevated LFTs R79.89    Pneumonia J18.9    Wheezing R06.2    Requires supplemental oxygen Z99.81    Acute respiratory failure with hypoxia (HCC) J96.01    Multiple closed fractures of ribs of right side S22.41XA    Closed fracture of right pelvis, initial encounter ([facility] [address] S32. 9XXA    Essential hypertension I10       Isolation/Infection:   Isolation            No Isolation          Patient Infection Status       Infection Onset Added Last Indicated Last Indicated By Review Planned Expiration Resolved Resolved By    None active    Resolved    COVID-19 Rule Out 02/24/21 02/24/21 02/24/21 COVID-19, Rapid (Ordered)   02/24/21 Rule-Out Test Resulted    COVID-19 Rule Out 10/16/20 10/16/20 10/16/20 COVID-19 (Ordered)   10/16/20 Rule-Out Test Resulted    COVID-19 Rule Out 10/11/20 10/11/20 10/11/20 COVID-19 (Ordered)   10/11/20 Rule-Out Test Resulted            Nurse Assessment:  Last Vital Signs: /77   Pulse 85   Temp 97.6 °F (36.4 °C) (Oral)   Resp 16   Ht 5' 7\" (1.702 m)   Wt 168 lb 3.2 oz (76.3 kg)   SpO2 97%   BMI 26.34 kg/m²     Last documented pain score (0-10 scale): Pain Level: 3  Last Weight:   Wt Readings from Last 1 Encounters:   02/24/21 168 lb 3.2 oz (76.3 kg)     Mental Status:  disoriented    IV Access:  - None    Nursing Mobility/ADLs:  Walking   Dependent  Transfer  Assisted  Bathing  Assisted  Dressing  Assisted  Toileting  Assisted  Feeding  Assisted  Med Admin  Assisted  Med Delivery   whole    Wound Care Documentation and Therapy:        Elimination:  Continence:   · Bowel: No  · Bladder: No  Urinary Catheter: None   Colostomy/Ileostomy/Ileal Conduit: No       Date of Last BM: 02/23/21    Intake/Output Summary (Last 24 hours) at 2/25/2021 1106  Last data filed at 2/25/2021 0920  Gross per 24 hour   Intake 120 ml Output 300 ml   Net -180 ml     I/O last 3 completed shifts:  In: -   Out: 300 [Urine:300]    Safety Concerns:     History of Falls (last 30 days) and At Risk for Falls    Impairments/Disabilities:      None    Nutrition Therapy:  Current Nutrition Therapy:   - Oral Diet:  General    Routes of Feeding: None  Liquids: Thin Liquids  Daily Fluid Restriction: no  Last Modified Barium Swallow with Video (Video Swallowing Test): not done    Treatments at the Time of Hospital Discharge:   Respiratory Treatments: n/a  Oxygen Therapy:  is on oxygen at 1 L/min per nasal cannula. Ventilator:    - No ventilator support    Rehab Therapies: Physical Therapy and Occupational Therapy  Weight Bearing Status/Restrictions: Touchdown weight bearing (10-25 lbs) only on right leg  Other Medical Equipment (for information only, NOT a DME order):  walker  Other Treatments: n/a    Patient's personal belongings (please select all that are sent with patient):  None    RN SIGNATURE:  Electronically signed by Kelly Lundberg RN on 2/25/21 at 1:04 PM EST    CASE MANAGEMENT/SOCIAL WORK SECTION    Inpatient Status Date: 2/24/2021    Readmission Risk Assessment Score:  Readmission Risk              Risk of Unplanned Readmission:        14           Discharging to Facility/ Agency   · Name: [facility]  · Address: [address] [place] [address], [facility]  · Phone: [phone]  · Fax: [phone]  ·     Dialysis Facility (if applicable)   · Name:  · Address:  · Dialysis Schedule:  · Phone:  · Fax:    / signature: Electronically signed by Kaylyn Rebollar RN on 2/25/21 at 11:15 AM EST    PHYSICIAN SECTION    Prognosis: Good    Condition at Discharge: Stable    Rehab Potential (if transferring to Rehab): Fair    Recommended Labs or Other Treatments After Discharge:  Will need CBC monitored every other day while taking Lovenox due to hematoma on hip, if CBC continues to remainsstable, may discontinue CBC checks    Physician Certification: I certify the above information and transfer of Haris Hayes  is necessary for the continuing treatment of the diagnosis listed and that she requires Donovan Shaikh for greater 30 days.      Update Admission H&P: No change in H&P    PHYSICIAN SIGNATURE:  Electronically signed by Jm Linton MD on 2/25/21 at 11:19 AM EST

## 2021-02-27 LAB
ORGANISM: ABNORMAL
URINE CULTURE, ROUTINE: ABNORMAL
URINE CULTURE, ROUTINE: ABNORMAL

## 2021-02-28 ENCOUNTER — TELEPHONE (OUTPATIENT)
Dept: FAMILY MEDICINE CLINIC | Age: 86
End: 2021-02-28

## 2021-02-28 DIAGNOSIS — S22.49XS CLOSED FRACTURE OF MULTIPLE RIBS, UNSPECIFIED LATERALITY, SEQUELA: Primary | ICD-10-CM

## 2021-02-28 RX ORDER — HYDROCODONE BITARTRATE AND ACETAMINOPHEN 5; 325 MG/1; MG/1
1 TABLET ORAL EVERY 4 HOURS PRN
Qty: 90 TABLET | Refills: 0 | Status: SHIPPED | OUTPATIENT
Start: 2021-02-28 | End: 2021-03-30

## 2021-02-28 NOTE — TELEPHONE ENCOUNTER
Patient at [facility]. She has fractured ribs and fractured pelvis after a fall. Was sent back from the hospital on tramadol and Tylenol. Pain is not controlled. Hydrocodone prescribed.
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
## 2021-09-28 NOTE — PLAN OF CARE
Ortho POC note:    Pt presented to the ED after being found down in her home and c/o of right hip pain. Imaging revealed a left femoral neck fracture. During discussion with patient today she still endorses right hip pain but notes left hip pain with log roll. No right hip pain with log roll. Pt seems somewhat confused today, no family bedside. Unsure of baseline. Left LE:  Positive log roll testing for pain to left hip  Left LE shortened and slightly externally rotated  NVI to left LE    Negative log roll testing to right hip. CT pelvis:  Left femoral neck fracture    A/P:  Discussed with patient about her fall and resulting left hip fracture. I contacted her son as well over the phone to discuss same. We discussed recommendation for surgical fixation of fracture utilizing hemiarthroplasty today with Dr. Rosemary Severe. Son in agreement with POC. Consent, abx, NPO. Labs and imaging reviewed. UTI noted. ED and IM notes reviewed.   Pt cleared for OR  Discussed with Dr. Rosemary Severe, full consult to follow    Darby Nageotte PA-C Not applicable

## 2023-12-31 DIAGNOSIS — Z51.5 PALLIATIVE CARE STATUS: ICD-10-CM

## 2023-12-31 DIAGNOSIS — F03.90 DEMENTIA WITHOUT BEHAVIORAL DISTURBANCE (HCC): ICD-10-CM

## 2023-12-31 DIAGNOSIS — F41.9 ANXIETY: Primary | ICD-10-CM

## 2023-12-31 RX ORDER — LORAZEPAM 2 MG/ML
1 CONCENTRATE ORAL EVERY 4 HOURS PRN
Qty: 30 ML | Refills: 0 | Status: SHIPPED | OUTPATIENT
Start: 2023-12-31 | End: 2024-01-30

## 2023-12-31 RX ORDER — MORPHINE SULFATE 100 MG/5ML
5 SOLUTION ORAL EVERY 4 HOURS PRN
Qty: 30 ML | Refills: 0 | Status: SHIPPED | OUTPATIENT
Start: 2023-12-31 | End: 2024-01-30

## (undated) DEVICE — COVER,TABLE,HEAVY DUTY,50"X90",STRL: Brand: MEDLINE

## (undated) DEVICE — Device

## (undated) DEVICE — SOLUTION IRRIG 2000ML 0.9% SOD CHL USP UROMATIC PLAS CONT

## (undated) DEVICE — LIGHT HANDLE: Brand: DEVON

## (undated) DEVICE — ADHESIVE SKIN CLSR 0.7ML TOP DERMBND ADV

## (undated) DEVICE — HIP PILLOW, ABDUCTION: Brand: DEROYAL

## (undated) DEVICE — SUTURE VCRL + SZ 0 L18IN ABSRB UD L36MM CT-1 1/2 CIR VCP840D

## (undated) DEVICE — SUTURE STRATAFIX SYMMETRIC SZ 1 L18IN ABSRB VLT CT1 L36CM SXPP1A404

## (undated) DEVICE — SYRINGE 30CC LUER LOCK: Brand: CARDINAL HEALTH

## (undated) DEVICE — SUTURE MCRYL + SZ 4-0 L18IN ABSRB UD L19MM PS-2 3/8 CIR MCP496G

## (undated) DEVICE — SUTURE STRATAFIX SPRL SZ 2 0 L14IN ABSRB UD MH L36MM 1 2 CIR SXMD2B401

## (undated) DEVICE — SYSTEM SKIN CLSR 22CM DERMBND PRINEO

## (undated) DEVICE — GLOVE SURG SZ 8 L12IN THK75MIL DK GRN LTX FREE

## (undated) DEVICE — HANDPIECE SET WITH HIGH FLOW TIP AND SUCTION TUBE: Brand: INTERPULSE

## (undated) DEVICE — SUTURE STRATAFIX SPRL SZ 2-0 L14IN ABSRB VLT MH L36MM 1/2 SXPD2B412

## (undated) DEVICE — 35 ML SYRINGE LUER-LOCK TIP: Brand: MONOJECT

## (undated) DEVICE — SUTURE ETHBND EXCEL SZ 2 L30IN NONABSORBABLE GRN L40MM V-37 MX69G

## (undated) DEVICE — SMARTGOWN BREATHABLE SURGICAL GOWN: Brand: CONVERTORS

## (undated) DEVICE — HEWSON SUTURE RETRIEVER: Brand: HEWSON SUTURE RETRIEVER

## (undated) DEVICE — DRAPE,REIN 53X77,STERILE: Brand: MEDLINE

## (undated) DEVICE — PILLOW POS W15XH6XL22IN RASPBERRY FOAM ABD W/ STRP DISP FOR

## (undated) DEVICE — NEEDLE SPNL WEISS LNG 18 GAX5 IN MOD TUOHY PT TW PERISAFE

## (undated) DEVICE — SOLUTION IV IRRIG 500ML 0.9% SODIUM CHL 2F7123

## (undated) DEVICE — OPTIFOAM GENTLE SA, POSTOP, 4X12: Brand: MEDLINE

## (undated) DEVICE — SUTURE STRATAFIX SZ 3-0 L14CM NONABSORBABLE UD L19MM FS-2 SXMP2B406